# Patient Record
Sex: FEMALE | Race: WHITE | NOT HISPANIC OR LATINO | Employment: FULL TIME | URBAN - METROPOLITAN AREA
[De-identification: names, ages, dates, MRNs, and addresses within clinical notes are randomized per-mention and may not be internally consistent; named-entity substitution may affect disease eponyms.]

---

## 2017-01-03 ENCOUNTER — ALLSCRIPTS OFFICE VISIT (OUTPATIENT)
Dept: OTHER | Facility: OTHER | Age: 57
End: 2017-01-03

## 2017-01-19 ENCOUNTER — HOSPITAL ENCOUNTER (OUTPATIENT)
Dept: MRI IMAGING | Facility: HOSPITAL | Age: 57
Discharge: HOME | End: 2017-01-19
Attending: RADIOLOGY
Payer: COMMERCIAL

## 2017-01-19 DIAGNOSIS — D32.9 BENIGN NEOPLASM OF MENINGES (HCC): ICD-10-CM

## 2017-01-19 PROCEDURE — 70553 MRI BRAIN STEM W/O & W/DYE: CPT

## 2017-01-19 PROCEDURE — A9585 GADOBUTROL INJECTION: HCPCS | Performed by: RADIOLOGY

## 2017-01-19 RX ADMIN — GADOBUTROL 9 ML: 604.72 INJECTION INTRAVENOUS at 12:54

## 2017-01-25 ENCOUNTER — GENERIC CONVERSION - ENCOUNTER (OUTPATIENT)
Dept: OTHER | Facility: OTHER | Age: 57
End: 2017-01-25

## 2017-01-25 ENCOUNTER — APPOINTMENT (OUTPATIENT)
Dept: RADIATION ONCOLOGY | Facility: HOSPITAL | Age: 57
End: 2017-01-25
Attending: RADIOLOGY
Payer: COMMERCIAL

## 2017-01-25 ENCOUNTER — TRANSCRIBE ORDERS (OUTPATIENT)
Dept: ADMINISTRATIVE | Facility: HOSPITAL | Age: 57
End: 2017-01-25

## 2017-01-25 DIAGNOSIS — D32.9 BENIGN NEOPLASM OF MENINGES (HCC): Primary | ICD-10-CM

## 2017-01-25 PROCEDURE — 99214 OFFICE O/P EST MOD 30 MIN: CPT | Performed by: RADIOLOGY

## 2017-02-03 ENCOUNTER — ALLSCRIPTS OFFICE VISIT (OUTPATIENT)
Dept: OTHER | Facility: OTHER | Age: 57
End: 2017-02-03

## 2017-02-04 LAB — TSH SERPL DL<=0.05 MIU/L-ACNC: 1.89 MIU/L

## 2017-02-06 ENCOUNTER — GENERIC CONVERSION - ENCOUNTER (OUTPATIENT)
Dept: OTHER | Facility: OTHER | Age: 57
End: 2017-02-06

## 2017-03-09 ENCOUNTER — ALLSCRIPTS OFFICE VISIT (OUTPATIENT)
Dept: OTHER | Facility: OTHER | Age: 57
End: 2017-03-09

## 2017-03-09 DIAGNOSIS — Z12.31 ENCOUNTER FOR SCREENING MAMMOGRAM FOR MALIGNANT NEOPLASM OF BREAST: ICD-10-CM

## 2017-03-10 ENCOUNTER — LAB CONVERSION - ENCOUNTER (OUTPATIENT)
Dept: OTHER | Facility: OTHER | Age: 57
End: 2017-03-10

## 2017-03-10 LAB
BV/VAGINITIS PANEL DNA PROBE (HISTORICAL): NORMAL
CONTAINER TYP (HISTORICAL): NORMAL
FINAL RESOLUTION (HISTORICAL): NORMAL
QUESTION/PROBLEM (HISTORICAL): NORMAL
SPECIMEN STATUS REPORT (HISTORICAL): NORMAL

## 2017-03-16 ENCOUNTER — LAB CONVERSION - ENCOUNTER (OUTPATIENT)
Dept: OTHER | Facility: OTHER | Age: 57
End: 2017-03-16

## 2017-03-16 LAB
ADDITIONAL INFORMATION (HISTORICAL): NORMAL
ADEQUACY: (HISTORICAL): NORMAL
BV/VAGINITIS PANEL DNA PROBE (HISTORICAL): NORMAL
CONTAINER TYP (HISTORICAL): NORMAL
CYTOTECHNOLOGIST: (HISTORICAL): NORMAL
FINAL RESOLUTION (HISTORICAL): NORMAL
HPV MRNA E6/E7 (HISTORICAL): NOT DETECTED
INTERPRETATION (HISTORICAL): NORMAL
LMP (HISTORICAL): NORMAL
PREV. BX: (HISTORICAL): NORMAL
PREV. PAP (HISTORICAL): NORMAL
QUESTION/PROBLEM (HISTORICAL): NORMAL
SOURCE (HISTORICAL): NORMAL
SPECIMEN STATUS REPORT (HISTORICAL): NORMAL

## 2017-05-08 ENCOUNTER — HOSPITAL ENCOUNTER (OUTPATIENT)
Dept: MAMMOGRAPHY | Facility: MEDICAL CENTER | Age: 57
Discharge: HOME | End: 2017-05-08
Payer: COMMERCIAL

## 2017-05-08 DIAGNOSIS — Z12.31 ENCOUNTER FOR SCREENING MAMMOGRAM FOR MALIGNANT NEOPLASM OF BREAST: ICD-10-CM

## 2017-05-08 PROCEDURE — G0202 SCR MAMMO BI INCL CAD: HCPCS

## 2017-06-06 ENCOUNTER — ALLSCRIPTS OFFICE VISIT (OUTPATIENT)
Dept: OTHER | Facility: OTHER | Age: 57
End: 2017-06-06

## 2017-07-13 ENCOUNTER — HOSPITAL ENCOUNTER (OUTPATIENT)
Dept: MRI IMAGING | Facility: HOSPITAL | Age: 57
Discharge: HOME | End: 2017-07-13
Attending: RADIOLOGY
Payer: COMMERCIAL

## 2017-07-13 DIAGNOSIS — D32.9 BENIGN NEOPLASM OF MENINGES (HCC): ICD-10-CM

## 2017-07-13 PROCEDURE — 70553 MRI BRAIN STEM W/O & W/DYE: CPT

## 2017-07-13 PROCEDURE — A9585 GADOBUTROL INJECTION: HCPCS | Performed by: RADIOLOGY

## 2017-07-13 RX ADMIN — GADOBUTROL 8 ML: 604.72 INJECTION INTRAVENOUS at 09:46

## 2017-07-19 ENCOUNTER — TRANSCRIBE ORDERS (OUTPATIENT)
Dept: ADMINISTRATIVE | Facility: HOSPITAL | Age: 57
End: 2017-07-19

## 2017-07-19 ENCOUNTER — GENERIC CONVERSION - ENCOUNTER (OUTPATIENT)
Dept: OTHER | Facility: OTHER | Age: 57
End: 2017-07-19

## 2017-07-19 ENCOUNTER — APPOINTMENT (OUTPATIENT)
Dept: RADIATION ONCOLOGY | Facility: HOSPITAL | Age: 57
End: 2017-07-19
Attending: RADIOLOGY
Payer: COMMERCIAL

## 2017-07-19 DIAGNOSIS — D32.9 BENIGN NEOPLASM OF MENINGES (HCC): Primary | ICD-10-CM

## 2017-07-19 PROCEDURE — 99214 OFFICE O/P EST MOD 30 MIN: CPT | Performed by: RADIOLOGY

## 2017-07-25 DIAGNOSIS — D32.9 BENIGN NEOPLASM OF MENINGES (HCC): ICD-10-CM

## 2017-07-25 DIAGNOSIS — N83.291 OTHER OVARIAN CYST, RIGHT SIDE: ICD-10-CM

## 2017-08-13 ENCOUNTER — APPOINTMENT (EMERGENCY)
Dept: ULTRASOUND IMAGING | Facility: HOSPITAL | Age: 57
End: 2017-08-13
Payer: COMMERCIAL

## 2017-08-13 ENCOUNTER — HOSPITAL ENCOUNTER (EMERGENCY)
Facility: HOSPITAL | Age: 57
Discharge: HOME | End: 2017-08-13
Admitting: EMERGENCY MEDICINE
Payer: COMMERCIAL

## 2017-08-13 VITALS
TEMPERATURE: 98.7 F | SYSTOLIC BLOOD PRESSURE: 122 MMHG | DIASTOLIC BLOOD PRESSURE: 72 MMHG | RESPIRATION RATE: 16 BRPM | WEIGHT: 195 LBS | OXYGEN SATURATION: 98 % | HEART RATE: 67 BPM

## 2017-08-13 DIAGNOSIS — N83.202 LEFT OVARIAN CYST: Primary | ICD-10-CM

## 2017-08-13 DIAGNOSIS — R10.2 PELVIC PAIN: ICD-10-CM

## 2017-08-13 LAB
ANION GAP SERPL CALCULATED.3IONS-SCNC: 7 MMOL/L (ref 4–13)
BACTERIA UR QL AUTO: ABNORMAL /HPF
BASOPHILS # BLD AUTO: 0.02 THOUSANDS/ΜL (ref 0–0.1)
BASOPHILS NFR BLD AUTO: 0 % (ref 0–1)
BILIRUB UR QL STRIP: NEGATIVE
BUN SERPL-MCNC: 8 MG/DL (ref 5–25)
CALCIUM SERPL-MCNC: 8.9 MG/DL (ref 8.3–10.1)
CHLORIDE SERPL-SCNC: 100 MMOL/L (ref 100–108)
CLARITY UR: CLEAR
CO2 SERPL-SCNC: 30 MMOL/L (ref 21–32)
COLOR UR: YELLOW
CREAT SERPL-MCNC: 0.84 MG/DL (ref 0.6–1.3)
EOSINOPHIL # BLD AUTO: 0.12 THOUSAND/ΜL (ref 0–0.61)
EOSINOPHIL NFR BLD AUTO: 2 % (ref 0–6)
ERYTHROCYTE [DISTWIDTH] IN BLOOD BY AUTOMATED COUNT: 13.1 % (ref 11.6–15.1)
EXT BLOOD URINE: ABNORMAL
GFR SERPL CREATININE-BSD FRML MDRD: 78 ML/MIN/1.73SQ M
GLUCOSE SERPL-MCNC: 99 MG/DL (ref 65–140)
GLUCOSE UR STRIP-MCNC: NEGATIVE MG/DL
HCG UR QL: NEGATIVE
HCT VFR BLD AUTO: 33.8 % (ref 34.8–46.1)
HGB BLD-MCNC: 12.1 G/DL (ref 11.5–15.4)
HGB UR QL STRIP.AUTO: ABNORMAL
KETONES UR STRIP-MCNC: NEGATIVE MG/DL
LEUKOCYTE ESTERASE UR QL STRIP: NEGATIVE
LYMPHOCYTES # BLD AUTO: 1.01 THOUSANDS/ΜL (ref 0.6–4.47)
LYMPHOCYTES NFR BLD AUTO: 13 % (ref 14–44)
MCH RBC QN AUTO: 29.6 PG (ref 26.8–34.3)
MCHC RBC AUTO-ENTMCNC: 35.8 G/DL (ref 31.4–37.4)
MCV RBC AUTO: 83 FL (ref 82–98)
MONOCYTES # BLD AUTO: 0.89 THOUSAND/ΜL (ref 0.17–1.22)
MONOCYTES NFR BLD AUTO: 12 % (ref 4–12)
NEUTROPHILS # BLD AUTO: 5.69 THOUSANDS/ΜL (ref 1.85–7.62)
NEUTS SEG NFR BLD AUTO: 73 % (ref 43–75)
NITRITE UR QL STRIP: NEGATIVE
NON-SQ EPI CELLS URNS QL MICRO: ABNORMAL /HPF
NRBC BLD AUTO-RTO: 0 /100 WBCS
PH UR STRIP.AUTO: 6 [PH] (ref 4.5–8)
PLATELET # BLD AUTO: 176 THOUSANDS/UL (ref 149–390)
PMV BLD AUTO: 11 FL (ref 8.9–12.7)
POTASSIUM SERPL-SCNC: 3.3 MMOL/L (ref 3.5–5.3)
PROT UR STRIP-MCNC: NEGATIVE MG/DL
RBC # BLD AUTO: 4.09 MILLION/UL (ref 3.81–5.12)
RBC #/AREA URNS AUTO: ABNORMAL /HPF
SODIUM SERPL-SCNC: 137 MMOL/L (ref 136–145)
SP GR UR STRIP.AUTO: 1.01 (ref 1–1.03)
UROBILINOGEN UR QL STRIP.AUTO: 1 E.U./DL
WBC # BLD AUTO: 7.73 THOUSAND/UL (ref 4.31–10.16)
WBC #/AREA URNS AUTO: ABNORMAL /HPF

## 2017-08-13 PROCEDURE — 36415 COLL VENOUS BLD VENIPUNCTURE: CPT | Performed by: PHYSICIAN ASSISTANT

## 2017-08-13 PROCEDURE — 81025 URINE PREGNANCY TEST: CPT | Performed by: PHYSICIAN ASSISTANT

## 2017-08-13 PROCEDURE — 99284 EMERGENCY DEPT VISIT MOD MDM: CPT

## 2017-08-13 PROCEDURE — 96374 THER/PROPH/DIAG INJ IV PUSH: CPT

## 2017-08-13 PROCEDURE — 85025 COMPLETE CBC W/AUTO DIFF WBC: CPT | Performed by: PHYSICIAN ASSISTANT

## 2017-08-13 PROCEDURE — 96376 TX/PRO/DX INJ SAME DRUG ADON: CPT

## 2017-08-13 PROCEDURE — 81002 URINALYSIS NONAUTO W/O SCOPE: CPT | Performed by: PHYSICIAN ASSISTANT

## 2017-08-13 PROCEDURE — 81001 URINALYSIS AUTO W/SCOPE: CPT

## 2017-08-13 PROCEDURE — 76830 TRANSVAGINAL US NON-OB: CPT

## 2017-08-13 PROCEDURE — 76856 US EXAM PELVIC COMPLETE: CPT

## 2017-08-13 PROCEDURE — 80048 BASIC METABOLIC PNL TOTAL CA: CPT | Performed by: PHYSICIAN ASSISTANT

## 2017-08-13 RX ORDER — HYDROCHLOROTHIAZIDE 50 MG/1
50 TABLET ORAL DAILY
COMMUNITY
End: 2019-02-06 | Stop reason: ALTCHOICE

## 2017-08-13 RX ORDER — LEVOTHYROXINE SODIUM 0.1 MG/1
100 TABLET ORAL DAILY
COMMUNITY
End: 2018-08-01 | Stop reason: ALTCHOICE

## 2017-08-13 RX ORDER — ASPIRIN 325 MG
325 TABLET ORAL DAILY
COMMUNITY
End: 2018-08-01 | Stop reason: ALTCHOICE

## 2017-08-13 RX ORDER — KETOROLAC TROMETHAMINE 30 MG/ML
15 INJECTION, SOLUTION INTRAMUSCULAR; INTRAVENOUS ONCE
Status: COMPLETED | OUTPATIENT
Start: 2017-08-13 | End: 2017-08-13

## 2017-08-13 RX ORDER — NAPROXEN 500 MG/1
500 TABLET ORAL 2 TIMES DAILY WITH MEALS
Qty: 30 TABLET | Refills: 0 | Status: SHIPPED | OUTPATIENT
Start: 2017-08-13 | End: 2018-08-01 | Stop reason: ALTCHOICE

## 2017-08-13 RX ADMIN — KETOROLAC TROMETHAMINE 15 MG: 30 INJECTION, SOLUTION INTRAMUSCULAR at 10:23

## 2017-08-13 RX ADMIN — KETOROLAC TROMETHAMINE 15 MG: 30 INJECTION, SOLUTION INTRAMUSCULAR at 14:37

## 2017-08-16 ENCOUNTER — ALLSCRIPTS OFFICE VISIT (OUTPATIENT)
Dept: OTHER | Facility: OTHER | Age: 57
End: 2017-08-16

## 2017-08-16 PROCEDURE — 88305 TISSUE EXAM BY PATHOLOGIST: CPT | Performed by: NURSE PRACTITIONER

## 2017-08-17 ENCOUNTER — LAB REQUISITION (OUTPATIENT)
Dept: LAB | Facility: HOSPITAL | Age: 57
End: 2017-08-17
Payer: COMMERCIAL

## 2017-08-17 DIAGNOSIS — R93.89 ABNORMAL FINDINGS ON DIAGNOSTIC IMAGING OF OTHER SPECIFIED BODY STRUCTURES: ICD-10-CM

## 2017-09-29 ENCOUNTER — HOSPITAL ENCOUNTER (OUTPATIENT)
Dept: ULTRASOUND IMAGING | Facility: MEDICAL CENTER | Age: 57
Discharge: HOME | End: 2017-09-29
Payer: COMMERCIAL

## 2017-09-29 DIAGNOSIS — N83.291 OTHER OVARIAN CYST, RIGHT SIDE: ICD-10-CM

## 2017-09-29 PROCEDURE — 76830 TRANSVAGINAL US NON-OB: CPT

## 2017-09-29 PROCEDURE — 76856 US EXAM PELVIC COMPLETE: CPT

## 2017-10-10 DIAGNOSIS — N83.209 CYST OF OVARY: ICD-10-CM

## 2017-10-19 ENCOUNTER — ALLSCRIPTS OFFICE VISIT (OUTPATIENT)
Dept: OTHER | Facility: OTHER | Age: 57
End: 2017-10-19

## 2017-10-20 NOTE — PROGRESS NOTES
Assessment  1  Acute bronchitis (466 0) (J20 9)  2  Pain in both hands (729 5) (M79 641,M79 642)1      1 Amended By: Mariana Bowen; Oct 19 2017 10:28 AM EST    Plan  Acute bronchitis    · Start: Azithromycin 250 MG Oral Tablet; TAKE 2 TABLETS ON DAY 1 THEN TAKE 1  TABLET A DAY FOR 4 DAYS   · Call [phone] if: The cough is not gone in 10 days ; Status:Complete;   Done:  31MNB2285   · Call [phone] if: You have pain in the chest that gets worse with deep breathing or  coughing ; Status:Complete;   Done: 79ZLS7490   · Seek Immediate Medical Attention if: ; Status:Complete;   Done: 86OUO3400   · Seek Immediate Medical Attention if: You are feeling short of breath ; Status:Complete;    Done: 91XFY4115   · Use a cool mist humidifier in the room ; Status:Complete;   Done: 14UTZ7833    Discussion/Summary    Recommend evaluation by orthopedic hand specialist  Card [facility]  Also, she will call if no improvement in symptoms  Sample of Dulera inhaler given to patient to be used 2 puffs twice a day  If no improvement consider chest x-ray  Recommended naproxen twice daily for hand pain as needed  Chief Complaint  head cold      History of Present Illness  HPI: Patient is here today with cough for one and a half weeks  Cough is occasionally productive  No GI complaints  She admits to occasional sinus congestion  No rashes  She notes occasional pain to the bilateral hands as well  Onset 612 months ago  Pain is worse at the end of a long workday  She notes pain symmetrically and pain is worse to the bases of the fingers  Denies any arm numbness or upper arm or shoulder pain  Review of Systems    Constitutional: No fever, no chills, feels well, no tiredness, no recent weight gain or loss  ENT: as noted in HPI  Cardiovascular: no complaints of slow or fast heart rate, no chest pain, no palpitations, no leg claudication or lower extremity edema     Respiratory: as noted in HPI  Breasts: no complaints of breast pain, breast lump or nipple discharge  Gastrointestinal: no complaints of abdominal pain, no constipation, no nausea or diarrhea, no vomiting, no bloody stools  Genitourinary: no complaints of dysuria, no incontinence, no pelvic pain, no dysmenorrhea, no vaginal discharge or abnormal vaginal bleeding  Musculoskeletal: as noted in HPI  Integumentary: no complaints of skin rash or lesion, no itching or dry skin, no skin wounds  Neurological: no complaints of headache, no confusion, no numbness or tingling, no dizziness or fainting  Active Problems  1  Acute frontal sinusitis (461 1) (J01 10)  2  Aftercare following surgery for neoplasm (V58 42) (Z48 3)  3  Allergic rhinitis (477 9) (J30 9)  4  Difficulty balancing (781 99) (R29 818)  5  Encounter for pre-employment examination (V70 5) (Z02 1)  6  Encounter for routine gynecological examination with Papanicolaou smear of cervix   (V72 31,V76 2) (Z01 419)  7  Encounter for screening colonoscopy (V76 51) (Z12 11)  8  Encounter for screening mammogram for malignant neoplasm of breast (V76 12)   (Z12 31)  9  Esophageal reflux (530 81) (K21 9)  10  Fatigue (780 79) (R53 83)  11  Foot Pain (Soft Tissue) (729 5)  12  Glossitis (529 0) (K14 0)  13  Hemorrhagic cyst of ovary (620 2) (N83 209)  14  Hypertension (401 9) (I10)  15  Hypothyroidism (244 9) (E03 9)  16  Lateral epicondylitis (726 32) (M77 10)  17  Meningioma (225 2) (D32 9)  18  Migraine headache (346 90) (G43 909)  19  Other ovarian cyst, right side (620 2) (N83 291)  20  Rib pain (786 50) (R07 81)  21  Screening for colon cancer (V76 51) (Z12 11)  22  Slurred speech (784 59) (R47 81)  23  Strain of right shoulder (840 9) (S46 911A)  24  Tendonitis (726 90) (M77 9)  25  Thickened endometrium (793 5) (R93 8)  26  Urinary tract infection (599 0) (N39 0)  27  Vaginal discharge (623 5) (N89 8)  28   Visit for pre-operative examination (V72 84) (I78 039)    Past Medical History  1  Acute bronchitis (466 0) (J20 9)  2  Acute sinusitis (461 9) (J01 90)  3  Acute sinusitis (461 9) (J01 90)  4  History of Aftercare following surgery for neoplasm (V58 42) (Z48 3)  5  History of acute sinusitis (V12 69) (Z87 09)  6  History of acute sinusitis (V12 69) (Z87 09)  7  History of Postoperative visit (V58 49) (Z48 89)  Active Problems And Past Medical History Reviewed: The active problems and past medical history were reviewed and updated today  Family History  Father   1  Family history of   Maternal Grandmother   2  Family history of   Paternal Grandmother   3  Family history of   Maternal Grandfather   4  Family history of   Paternal Grandfather   5  Family history of   Family History   6  Family history of diabetes mellitus (V18 0) (Z83 3)  7  Family history of myocardial infarction (V17 3) (Z82 49)  8  Family history of Multiple Sclerosis    Social History   · Currently sexually active   · Full-time employment   · Lives with spouse   ·    · Never A Smoker   · No alcohol use   · No drug use   · Occupation   ·    · Stopped Drinking Alcohol    Surgical History  1  History of Craniotomy (Diagnostic)  2  History of Gallbladder Surgery  3  History of Intracranial Stereotactic Radiosurgery Complex Lesion  4  History of Tubal Ligation  Surgical History Reviewed: The surgical history was reviewed and updated today  Current Meds  1  Levothyroxine Sodium 50 MCG Oral Tablet; TAKE 1 TABLET DAILY; Therapy: 98OQW3841 to (Uriel Diallo)  Requested for: 57Ygy8558; Last   Rx:34Urh7653 Ordered  2  Losartan Potassium-HCTZ 50-12 5 MG Oral Tablet; Take 1 tablet once daily; Therapy: 56QEY2427 to (Last Rx:21Ear3137)  Requested for: 94Edr7923 Ordered  3  Rizatriptan Benzoate 10 MG Oral Tablet Disintegrating; TAKE 1 TABLET Daily PRN   headache;    Therapy: 49HKV4696 to (Evaluate:47Vvr9432)  Requested for: 52Tob7445; Last Rx:94Nzw9979 Ordered    The medication list was reviewed and updated today  Allergies  1  Augmentin XR TB12  2  Augmentin SUSR    Vitals   Recorded: 10GLI0846 09:19AM   Temperature 32 2 F   Systolic 233   Diastolic 80   Height 5 ft 3 in   Weight 196 lb    BMI Calculated 34 72   BSA Calculated 1 92     Physical Exam    Constitutional   General appearance: No acute distress, well appearing and well nourished  Eyes   Conjunctiva and lids: No swelling, erythema or discharge  Pupils and irises: Equal, round and reactive to light  Ears, Nose, Mouth, and Throat   External inspection of ears and nose: Normal     Otoscopic examination: Tympanic membranes translucent with normal light reflex  Canals patent without erythema  Nasal mucosa, septum, and turbinates: Normal without edema or erythema  Oropharynx: Normal with no erythema, edema, exudate or lesions  Pulmonary   Respiratory effort: No increased work of breathing or signs of respiratory distress  Auscultation of lungs: Clear to auscultation  Cardiovascular   Palpation of heart: Normal PMI, no thrills  Auscultation of heart: Normal rate and rhythm, normal S1 and S2, without murmurs  Examination of extremities for edema and/or varicosities: Normal     Carotid pulses: Normal     Abdomen   Abdomen: Non-tender, no masses  Liver and spleen: No hepatomegaly or splenomegaly  Lymphatic   Palpation of lymph nodes in neck: No lymphadenopathy  Musculoskeletal   Gait and station: Normal     Digits and nails: Normal without clubbing or cyanosis  Inspection/palpation of joints, bones, and muscles: Normal     Skin   Skin and subcutaneous tissue: Normal without rashes or lesions  Neurologic   Cranial nerves: Cranial nerves 2-12 intact  Reflexes: 2+ and symmetric  Sensation: No sensory loss      Psychiatric   Orientation to person, place, and time: Normal     Mood and affect: Normal          Signatures   Electronically signed by : Zahira Villa DO; Oct 19 2017 10:28AM EST                       (Author)

## 2018-01-11 NOTE — PROCEDURES
Procedures by Sherrie Garcia MD at 2016  12:44 PM      Author:  Sherrie Garcia MD Service:  (none) Author Type:  Physician     Filed:  2016  4:07 PM Date of Service:  2016 12:44 PM Status:  Signed     :  Sherrie Garcia MD (Physician)         Pre-procedure Diagnoses:       1  Meningioma [D32 9]                Post-procedure Diagnoses:       1  Meningioma [D32 9]                Procedures:       1  MS STEREOTACTIC RADIOSURGERY, Karthikeyan Waldron [46619 (CPT®)]                   PATIENT NAME: Mery Mcbride  : 1960  MRN: [id number]  PROCEDURE DATE: 16    Stereotactic Radiotherapy (SRT) Operative Note    Preop Diagnosis: recurrent right skull base meningioma    Postop Diagnosis: same    Procedure Details: Frameless Stereotactic Radiotherapy for recurrent meningioma    Surgeon: Sherrie Garcia MD, PhD     Assistants: none    No qualified resident was available to assist with this case  Radiation Oncologist(s): Estefania John    Medical Physicist(s): Eli Greene and Yessi Stoner    Estimated Blood Loss:  None           Specimens: None    Drains: None           Total IV Fluids: None              Findings: As above  Complications:  None    Anesthesia: None      INDICATIONS    50 y o  female who is now 2 years s/p a subtotal resection of a WHO I meningioma from the right CP angle (more at skull base, near hypoglossal canal, involving lateral dura on right) by Dr Shanae Hubbard  Surveillance imaging since that  time shows some growth/recurrence  Dr Shanae Hubbard referred the patient to the [facility]/Dr Radha Bianchi to discuss RT options, where the patient was seen by Dr Radha Bianchi, as well as myself  SRT and IMRT were felt to be RT options  Risks such as radiation necrosis were discussed/reviewed  Planning was initiated, and SRT was felt to be feasible and safe  During the patient's office encounter, a discussion of risks, benefits, and alternatives of various treatment options were reviewed with the patient  Options discussed included but were not limited to surgery, radiation therapy including variations thereof,  such as a whole brain radiation therapy, SRT, etc , chemotherapy alone, and combinations of the above  Risks specific to SRT were reviewed, including but not limited to radiation necrosis  The patient wished to proceed with SRT, and consent was obtained  DETAILS OF PROCEDURE    The patient presented to the outpatient area of the Department of Radiation Oncology where an open faced immobilization mask was created  The patient then underwent  a stereotactic head CT while immobilized in the mask  The patient was then released from the Department  The patients stereotactic CT and previous stereotactic MRI scans were fused in the [facility], which was used to develop the SRT plan  The SRT prescription called for a dose of 25 Gray to be delivered to the PTV in 5 fractions  The PTV was created by expanding the GTV, as contoured  by myself and the Radiation Oncologist, by 2 mm  Therefore, the entire GTV volume received >25 Rawleigh Butt  The GTV measured  4 6 cc, and the PTV measured 11 2 cc  The SRT plan utilized 2 rapid arcs, with the mini-multileaf columnator on the TrueBeam machine  at the [employer]  Organs at risk included the brain stem and spinal cord  When the final treatment plan had been developed and approved by myself, the radiation oncologist and physicist, the patient returned to the Department for their first frameless SRT treatment  The patient was positioned on the treatment couch  The Optic Surface Monitoring System (OSMS) was used initially to align the patient  The patient was then immobilized in their open faced mask  kV and then cone beam CT imaging was used to further align  the patient  Once the radiation oncologist, physicist and I agreed the patient was in correct position, the fields were treated sequentially without complications   The OSMS was used during the treatment to assure continued correct positioning of the patient,  and if it detected patient motion, interrupted the treatment beam until the patient was back in alignment  When the first SRT treatment had been delivered, the patient was recovered from the treatment room, scheduled for their remaining SRT treatments, and was discharged  from the department  There was no blood loss and no specimen       par  SIGNATURE: Sergio Mahoney MD, PhD  DATE: 7/6/16   TIME: 16:07                                  Received for:Salvador Martinez MD PhD  Jul 6 2016  4:06PM Eastern Standard Time

## 2018-01-12 VITALS
SYSTOLIC BLOOD PRESSURE: 120 MMHG | BODY MASS INDEX: 34.73 KG/M2 | DIASTOLIC BLOOD PRESSURE: 80 MMHG | TEMPERATURE: 99.2 F | WEIGHT: 196 LBS | HEIGHT: 63 IN

## 2018-01-12 VITALS
TEMPERATURE: 97.7 F | HEART RATE: 66 BPM | WEIGHT: 193 LBS | RESPIRATION RATE: 16 BRPM | HEIGHT: 63 IN | DIASTOLIC BLOOD PRESSURE: 90 MMHG | SYSTOLIC BLOOD PRESSURE: 130 MMHG | OXYGEN SATURATION: 98 % | BODY MASS INDEX: 34.2 KG/M2

## 2018-01-13 VITALS
TEMPERATURE: 97.9 F | DIASTOLIC BLOOD PRESSURE: 80 MMHG | BODY MASS INDEX: 35.08 KG/M2 | WEIGHT: 198 LBS | HEIGHT: 63 IN | SYSTOLIC BLOOD PRESSURE: 120 MMHG

## 2018-01-13 VITALS
WEIGHT: 193 LBS | DIASTOLIC BLOOD PRESSURE: 82 MMHG | TEMPERATURE: 97.9 F | BODY MASS INDEX: 34.2 KG/M2 | SYSTOLIC BLOOD PRESSURE: 136 MMHG | HEIGHT: 63 IN

## 2018-01-13 VITALS
BODY MASS INDEX: 34.96 KG/M2 | DIASTOLIC BLOOD PRESSURE: 80 MMHG | SYSTOLIC BLOOD PRESSURE: 138 MMHG | WEIGHT: 197.38 LBS

## 2018-01-13 VITALS
DIASTOLIC BLOOD PRESSURE: 84 MMHG | SYSTOLIC BLOOD PRESSURE: 140 MMHG | WEIGHT: 200 LBS | TEMPERATURE: 98.1 F | HEIGHT: 63 IN | BODY MASS INDEX: 35.44 KG/M2

## 2018-01-13 NOTE — RESULT NOTES
Message   Thyroid levels in normal range  Verified Results  (Q) TSH, 3RD GENERATION [id number] 07:00AM Mandy Pillion     Test Name Result Flag Reference   TSH 1 89 mIU/L     Reference Range                         > or = 20 Years  0 40-4 50                              Pregnancy Ranges            First trimester    0 26-2 66            Second trimester   0 55-2 73            Third trimester    0 43-2 91  NO COLLECTION DATE RECEIVED  WE HAVE USED  THE DATE THE SPECIMEN WAS RECEIVED BY THIS  LABORATORY AS THE COLLECTION DATE  IF THIS  IS INCORRECT, PLEASE CONTACT CLIENT SERVICES    PHONE NUMBER: [phone]

## 2018-01-14 VITALS — SYSTOLIC BLOOD PRESSURE: 128 MMHG | WEIGHT: 195.31 LBS | BODY MASS INDEX: 34.6 KG/M2 | DIASTOLIC BLOOD PRESSURE: 60 MMHG

## 2018-01-14 VITALS
BODY MASS INDEX: 34.44 KG/M2 | WEIGHT: 194.38 LBS | SYSTOLIC BLOOD PRESSURE: 124 MMHG | HEART RATE: 66 BPM | TEMPERATURE: 97.4 F | HEIGHT: 63 IN | OXYGEN SATURATION: 98 % | DIASTOLIC BLOOD PRESSURE: 74 MMHG

## 2018-01-15 NOTE — PROCEDURES
Procedures by Milind Kohli MD at 2016   1:14 PM      Author:  Milind Kohli MD Service:  (none) Author Type:  Physician     Filed:  2016  1:33 PM Date of Service:  2016  1:14 PM Status:  Signed     :  Milind Kohli MD (Physician)         Pre-procedure Diagnoses:       1  Meningioma [D32 9]                Post-procedure Diagnoses:       1  Meningioma [D32 9]                Procedures:       1  AL STEREOTACTIC RADIOSURGERY, Davidjosette Acharya [31130 (CPT®)]                   PATIENT NAME: Юлия Borjas  : 1960  MRN: [id number]  PROCEDURE DATE: 16      Stereotactic Radiotherapy (SRT) Operative Note    Preop Diagnosis: recurrent right skull base meningioma    Postop Diagnosis: same    Procedure Details: Frameless Stereotactic Radiotherapy for recurrent meningioma    Surgeon: Milind Kohli MD, PhD     Assistants: none    No qualified resident was available to assist with this case  Radiation Oncologist(s): Estefania John    Medical Physicist(s): Krystle Jimenez and Bing Lucas    Estimated Blood Loss:  None           Specimens: None    Drains: None           Total IV Fluids: None              Findings: As above  Complications:  None    Anesthesia: None      INDICATION    50 y o  female who is now 2 years s/p a subtotal resection of a WHO I meningioma from the right CP angle (more at skull base, near hypoglossal canal, involving lateral dura on right) by Dr Gisselle Butterfield  Surveillance imaging since that time shows some growth/recurrence  Dr Gisselle Butterfield referred the patient to the [facility]/Dr Rhona Sandhu to discuss RT options, where the patient was seen by Dr Rhona Sandhu, as well as myself  SRT and IMRT were felt to be RT options  Risks such as radiation necrosis were discussed/reviewed  Planning was initiated, and SRT was felt to be feasible and safe       During the patient's office encounter, a discussion of risks, benefits, and alternatives of various treatment options were reviewed with the patient  Options discussed included but were not  limited to surgery, radiation therapy including variations thereof, such as a whole brain radiation therapy, SRT, etc , chemotherapy alone, and combinations of the above  Risks specific to SRT were reviewed, including but not limited to radiation necrosis  The patient wished to proceed with SRT, and consent was obtained  The patient was simulated, planned, and even underwent her first treatment session of SRT, for which I was present, on 7/6/16  However, there were considerable difficulties on subsequent  visits (her second session, etc ), in particular in matching her initial positioning & set up, such that accuracy and precision with her initial mobilization and plan could not be confirmed  As a result, in discussions with Dr Rhona Sandhu, we elected to not  utilize the rest of her initial SRT plan, and the patient underwent another simulation session  From that, A new facemask/molding, head mold, also including a bite block, were fashioned, and a new SRT plan, as below, was created, building off the already  given dosing, but with different dose per fraction, etc  She returned to begin this new SRT plan on 7/20/16, for which I was present  DETAILS OF PROCEDURE    The patient presented to the outpatient area of the Department of Radiation Oncology where an open faced immobilization mask was created , which in this case included a bite block  The patient then underwent a stereotactic head CT while immobilized in the mask  The patient was then released from the Department  The patients stereotactic CT and previous stereotactic MRI scans were fused in the [facility], which was used to develop the new SRT plan  The SRT prescription called for a dose of 13 5 Gray to be delivered to the PTV in 3 fractions  The PTV was created by expanding the GTV, as  contoured by myself and the Radiation Oncologist, by 2 mm   Therefore, the entire GTV volume received >12 [place] , but also in summation with her previously 2 fractions at 5 Gy per fraction  The GTV measured 4 6 cc, and the PTV measured 10 7  cc  The SRT plan utilized 2 Rapid arcs, with the mini-multileaf columnator on the TrueBeam machine at the [employer]  When the new treatment plan had been developed and approved by myself, the radiation oncologist and physicist, the patient returned to the Department for their first frameless SRT treatment  using this new plan  The patient was positioned on the treatment couch  The Optic Surface Monitoring System (OSMS) was used initially to align the patient  The patient was then immobilized in their open faced mask  kV and then cone beam CT imaging was used to further align  the patient  Once the radiation oncologist, physicist and I agreed the patient was in correct position, the fields were treated sequentially without complications  The OSMS was used during the treatment to assure continued correct positioning of the patient,  and if it detected patient motion, interrupted the treatment beam until the patient was back in alignment  When the first SRT treatment had been delivered, the patient was recovered from the treatment room, scheduled for their remaining SRT treatments, and was discharged  from the department  There was no blood loss and no specimen       par  SIGNATURE: Rachel Wilson MD, PhD  DATE: 7/20/2016   TIME: 1:15 PM                                  Received for:Salvador Martinez MD PhD  Jul 20 2016  1:32PM Eastern Standard Time

## 2018-01-17 NOTE — PROGRESS NOTES
Assessment    1  Encounter for routine gynecological examination with Papanicolaou smear of cervix   (V72 31,V76 2) (Z01 419)    Plan  Encounter for routine gynecological examination with Papanicolaou smear of cervix    · (Q) THINPREP PAP AND HPV mRNA E6/E7; Status:Active - Retrospective By Protocol  Authorization; Requested PRY:[id number];    Perform:[facility]; JRR:[id number]; Last Updated By:Herbert Wyman; 3/9/2017 11:49:19 AM;Ordered;  For:Encounter for routine gynecological examination with Papanicolaou smear of cervix; Ordered By:Aldair River;  Encounter for screening colonoscopy    · *1 - 20 [facility] Physician Referral   Consult Only: the expectation is that the referring provider will communicate  back to the patient on treatment options  Evaluation and Treatment: the expectation  is that the referred to provider will communicate back to the patient on  treatment options  Status: Active - Retrospective By Protocol Authorization  Requested  for: [id number]   Ordered; For: Encounter for screening colonoscopy; Ordered By: Matthew Tapia Performed:  Due: 74CPF7259; Last Updated By: Matthieu Underwood; 3/9/2017 11:43:36 AM  Care Summary provided  : Yes  Encounter for screening mammogram for malignant neoplasm of breast    · * MAMMO SCREENING BILATERAL W CAD; Status:Hold For - Scheduling,Retrospective  By Protocol Authorization; Requested OFU:[id number];    Perform:[facility]; ZKS:[id number]; Last Updated By:Herbert Wyman; 3/9/2017 11:20:06 AM;Ordered;  For:Encounter for screening mammogram for malignant neoplasm of breast; Ordered By:Aldair River;  Vaginal discharge    · (1) VAGINITIS/ VAGINOSIS, DNA ( AFFIRM); Status:Active - Retrospective By Protocol  Authorization; Requested WJT:[id number];    Perform:[facility]; AAC:[id number]; Last Updated By:Herbert Wyman; 3/9/2017 11:49:19 AM;Ordered;   For:Vaginal discharge; Ordered By:Aldair River;    Discussion/Summary  health maintenance visit the risks and benefits of cervical cancer screening were discussed HPV and Pap Co-testing Done Today Breast cancer screening: the risks and benefits of breast cancer screening were discussed, self breast exam technique was taught, monthly self breast exam was advised, mammogram has been ordered and mammogram is needed every year  Colorectal cancer screening: the risks and benefits of colorectal cancer screening were discussed and colonoscopy has been ordered  Advice and education were given regarding weight bearing exercise, calcium supplements and vitamin D supplements  Patient discussion: discussed with the patient  Normal gyn exam  Pap w cotesting sent  Pt given order to schedule mammogram    Pt given order for colonoscopy at her request; will call pcp to check if needs authorization  affirm culture sent  She will be called with results  And at that time I will also send rx for erythemic area on buttocks  rto one year, call sooner w concerns  Chief Complaint  Yearly      History of Present Illness  HPI: 48 yo  new pt here for annual exam    Last seen here in  and had normal pap  sexually active infreq, but has dryness with coitus  Last mammo a few years ago  no breast, pelvic or g/u compaints  occ  irritation outside vagina  SH: dx'd with brain tumor in , has had surgery and radiation  Gets freq  MRIs       GYN , Adult Female [facility]: The patient is being seen for a gynecology evaluation  General Health:   Lifestyle:  She does not use tobacco  She denies alcohol use  She denies drug use  Reproductive health: the patient is postmenopausal   she is sexually active  pregnancy history: G 3P 3  Screening: Cervical cancer screening includes uncertain timing of her last pap smear and uncertain timing of her last human papilloma virus screening   Breast cancer screening includes uncertain timing of her last mammogram       Review of Systems  no pelvic pain, no vaginal pain, no vaginal discharge, no vaginal itching, no nonmenstrual bleeding, no vulvar itching, no vulvar lump or mass and no postmenopausal bleeding  Constitutional: No fever, no chills, feels well, no tiredness, no recent weight gain or loss  ENT: no ear ache, no loss of hearing, no nosebleeds or nasal discharge, no sore throat or hoarseness  Cardiovascular: no complaints of slow or fast heart rate, no chest pain, no palpitations, no leg claudication or lower extremity edema  Respiratory: no complaints of shortness of breath, no wheezing, no dyspnea on exertion, no orthopnea or PND  Breasts: no complaints of breast pain, breast lump or nipple discharge  Gastrointestinal: no complaints of abdominal pain, no constipation, no nausea or diarrhea, no vomiting, no bloody stools  Genitourinary: no complaints of dysuria, no incontinence, no pelvic pain, no dysmenorrhea, no vaginal discharge or abnormal vaginal bleeding  Musculoskeletal: no complaints of arthralgia, no myalgia, no joint swelling or stiffness, no limb pain or swelling  Integumentary: no complaints of skin rash or lesion, no itching or dry skin, no skin wounds  Neurological: brain tumor, but no complaints of headache, no confusion, no numbness or tingling, no dizziness or fainting and as noted in HPI  ROS reviewed  OB History  Pregnancy History (Brief):   Prior pregnancies: : 3  Para: 3 (living)       Active Problems    1  Acute frontal sinusitis (461 1) (J01 10)   2  Aftercare following surgery for neoplasm (V58 42) (Z48 3)   3  Allergic rhinitis (477 9) (J30 9)   4  Difficulty balancing (781 99) (R29 818)   5  Encounter for pre-employment examination (V70 5) (Z02 1)   6  Encounter for screening mammogram for malignant neoplasm of breast (V76 12)   (Z12 31)   7  Esophageal reflux (530 81) (K21 9)   8  Fatigue (780 79) (R53 83)   9  Foot Pain (Soft Tissue) (729 5)   10  Glossitis (529 0) (K14 0)   11   Hypertension (401 9) (I10)   12  Hypothyroidism (244 9) (E03 9)   13  Lateral epicondylitis (726 32) (M77 10)   14  Meningioma (225 2) (D32 9)   15  Migraine headache (346 90) (G43 909)   16  Rib pain (786 50) (R07 81)   17  Screening for colon cancer (V76 51) (Z12 11)   18  Slurred speech (784 59) (R47 81)   19  Strain of right shoulder (840 9) (S46 911A)   20  Tendonitis (726 90) (M77 9)   21  Urinary tract infection (599 0) (N39 0)   22  Visit for pre-operative examination (V72 84) (X21 357)    Past Medical History    · Acute sinusitis (461 9) (J01 90)   · Acute sinusitis (461 9) (J01 90)   · History of Aftercare following surgery for neoplasm (V58 42) (Z48 3)   · History of acute sinusitis (V12 69) (Z87 09)   · History of acute sinusitis (V12 69) (Z87 09)   · History of Postoperative visit (V58 49) (Z48 89)    The active problems and past medical history were reviewed and updated today  Surgical History    · History of Craniotomy (Diagnostic)   · History of Gallbladder Surgery   · History of Intracranial Stereotactic Radiosurgery Complex Lesion   · History of Tubal Ligation    The surgical history was reviewed and updated today  Family History  Father    · Family history of   Maternal Grandmother    · Family history of   Paternal Grandmother    · Family history of   Maternal Grandfather    · Family history of   Paternal Grandfather    · Family history of   Family History    · Family history of diabetes mellitus (V18 0) (Z83 3)   · Family history of myocardial infarction (V17 3) (Z82 49)   · Family history of Multiple Sclerosis    The family history was reviewed and updated today  Social History    · Currently sexually active   · Full-time employment   · Lives with spouse   ·    · Never A Smoker   · No alcohol use   · No drug use   · Occupation   ·    · Stopped Drinking Alcohol  The social history was reviewed and updated today   The social history was reviewed and is unchanged  Current Meds   1  Aleve 220 MG Oral Capsule; TAKE 1 CAPSULE Twice daily PRN headaches / migraines; Therapy: (Recorded:2016) to Recorded   2  Fluticasone Propionate 50 MCG/ACT Nasal Suspension; USE 2 SPRAYS IN EACH   NOSTRIL ONCE DAILY; Therapy: 96CDD6387 to (Last Rx:2016)  Requested for: 97DPR1075 Ordered   3  Levothyroxine Sodium 50 MCG Oral Tablet; TAKE 1 TABLET DAILY; Therapy: 39TFZ4106 to ([facility] [place] [facility])  Requested for: 77Lry4823; Last   Rx:80Rmf0266 Ordered   4  Losartan Potassium-HCTZ 50-12 5 MG Oral Tablet; Take 1 tablet once daily; Therapy: 14KDV8563 to (Last Rx:09Xby9961)  Requested for: 25Qbb1279 Ordered   5  Meloxicam 15 MG Oral Tablet; TAKE 1 TABLET Daily PRN joint pain; Therapy: 21EPY4101 to ([facility])  Requested for: 77VEW1366; Last   X62YUN7006 Ordered   6  Rizatriptan Benzoate 10 MG Oral Tablet Dispersible; TAKE 1 TABLET Daily PRN   headache; Therapy: 97MTK9672 to (Last Rx:2016)  Requested for: 18TIS9539 Ordered    Allergies    1  Augmentin XR TB12   2  Augmentin SUSR    Vitals   Recorded: 61FQF3892 30:30NY   Systolic 543   Diastolic 60   Weight 296 lb 4 96 oz     Physical Exam    Constitutional   General appearance: No acute distress, well appearing and well nourished  Neck   Neck: Normal, supple, trachea midline, no masses  Thyroid: Normal, no thyromegaly  Pulmonary   Respiratory effort: No increased work of breathing or signs of respiratory distress  Auscultation of lungs: Clear to auscultation  Cardiovascular   Auscultation of heart: Normal rate and rhythm, normal S1 and S2, no murmurs  Genitourinary   External genitalia: Normal and no lesions appreciated  areas of erythema, well demarcated c/w fungus on buttocks  Vagina: Normal, no lesions or dryness appreciated  Vagina: moderate, white and thick vaginal discharge     Urethra: Normal     Urethral meatus: Normal     Bladder: Normal, soft, non-tender and no prolapse or masses appreciated  Cervix: Normal, no palpable masses  Uterus: Normal, non-tender, not enlarged, and no palpable masses  Adnexa/parametria: Normal, non-tender and no fullness or masses appreciated  Chest   Breasts: Normal and no dimpling or skin changes noted  Abdomen   Abdomen: Normal, non-tender, and no organomegaly noted  Liver and spleen: No hepatomegaly or splenomegaly  Examination for hernias: No hernias appreciated  Lymphatic   Palpation of lymph nodes in neck, axillae, groin and/or other locations: No lymphadenopathy or masses noted  Skin   Skin and subcutaneous tissue: Normal skin turgor and no rashes  Palpation of skin and subcutaneous tissue: Normal     Psychiatric   Orientation to person, place, and time: Normal     Mood and affect: Normal        Signatures   Electronically signed by :  Pratibha Chaney; Mar  9 2017  1:49PM EST                       (Author)    Electronically signed by : BREE Crow ; Mar 10 2017  9:33AM EST

## 2018-01-19 DIAGNOSIS — D32.9 BENIGN NEOPLASM OF MENINGES (HCC): ICD-10-CM

## 2018-01-26 ENCOUNTER — HOSPITAL ENCOUNTER (OUTPATIENT)
Dept: MRI IMAGING | Facility: HOSPITAL | Age: 58
Discharge: HOME | End: 2018-01-26
Attending: RADIOLOGY
Payer: COMMERCIAL

## 2018-01-26 DIAGNOSIS — D32.9 BENIGN NEOPLASM OF MENINGES (HCC): ICD-10-CM

## 2018-01-26 PROCEDURE — 70553 MRI BRAIN STEM W/O & W/DYE: CPT

## 2018-01-26 PROCEDURE — A9585 GADOBUTROL INJECTION: HCPCS | Performed by: RADIOLOGY

## 2018-01-26 RX ADMIN — GADOBUTROL 9 ML: 604.72 INJECTION INTRAVENOUS at 08:37

## 2018-01-29 DIAGNOSIS — D32.9 BENIGN NEOPLASM OF MENINGES (HCC): Primary | ICD-10-CM

## 2018-01-31 ENCOUNTER — APPOINTMENT (OUTPATIENT)
Dept: RADIATION ONCOLOGY | Facility: HOSPITAL | Age: 58
End: 2018-01-31
Attending: RADIOLOGY
Payer: COMMERCIAL

## 2018-01-31 ENCOUNTER — RADIATION ONCOLOGY FOLLOW-UP (OUTPATIENT)
Dept: RADIATION ONCOLOGY | Facility: HOSPITAL | Age: 58
End: 2018-01-31

## 2018-01-31 VITALS
BODY MASS INDEX: 36.99 KG/M2 | HEART RATE: 66 BPM | OXYGEN SATURATION: 96 % | HEIGHT: 62 IN | RESPIRATION RATE: 16 BRPM | DIASTOLIC BLOOD PRESSURE: 95 MMHG | SYSTOLIC BLOOD PRESSURE: 138 MMHG | WEIGHT: 201 LBS | TEMPERATURE: 98.6 F

## 2018-01-31 DIAGNOSIS — D32.9 BENIGN MENINGIOMA (HCC): Primary | ICD-10-CM

## 2018-01-31 PROCEDURE — 99214 OFFICE O/P EST MOD 30 MIN: CPT | Performed by: RADIOLOGY

## 2018-01-31 RX ORDER — RIZATRIPTAN BENZOATE 10 MG/1
1 TABLET, ORALLY DISINTEGRATING ORAL DAILY PRN
COMMUNITY
Start: 2014-01-16 | End: 2018-02-02 | Stop reason: SDUPTHER

## 2018-01-31 NOTE — PROGRESS NOTES
Katie Asencio  1960  [id number]    Interval History:  Patient present today for SRT follow up last seen 7/19/17  Last treatment completed on 7/25/16 1/26/18: MRI  Impression: Stable right lateral foramen magnum enhancing mass consistent with meningioma, essentially unchanged  Mild chronic microangiopathic ischemic changes involving supratentorial white matter, stable  No acute ischemic disease  Partially empty sella  Similar to previous study  Patient Active Problem List   Diagnosis    Benign meningioma [facility])     Past Medical History:   Diagnosis Date    Disease of thyroid gland     Hypertension     Migraine      Past Surgical History:   Procedure Laterality Date    CHOLECYSTECTOMY      [place] STEREOTACTIC RADIOSURGERY, CRANIAL,COMPLEX,SINGLE  7/6/2016         [place] STEREOTACTIC RADIOSURGERY, CRANIAL,COMPLEX,SINGLE  7/20/2016          Family History   Problem Relation Age of Onset    Breast cancer Mother      Social History     Social History    Marital status: /Civil Union     Spouse name: N/A    Number of children: N/A    Years of education: N/A     Occupational History    Not on file       Social History Main Topics    Smoking status: Never Smoker    Smokeless tobacco: Never Used    Alcohol use Yes      Comment: Socially    Drug use: No    Sexual activity: Yes     Other Topics Concern    Not on file     Social History Narrative    No narrative on file       Current Outpatient Prescriptions:     hydrochlorothiazide (HYDRODIURIL) 50 mg tablet, Take 50 mg by mouth daily, Disp: , Rfl:     levothyroxine 100 mcg tablet, Take 100 mcg by mouth daily, Disp: , Rfl:     rizatriptan (MAXALT-MLT) 10 MG disintegrating tablet, Take 1 tablet by mouth daily as needed, Disp: , Rfl:     aspirin 325 mg tablet, Take 325 mg by mouth daily, Disp: , Rfl:     naproxen (NAPROSYN) 500 mg tablet, Take 1 tablet by mouth 2 (two) times a day with meals, Disp: 30 tablet, Rfl: 0  Allergies   Allergen Reactions    Augmentin [Amoxicillin-Pot Clavulanate] Hives and GI Intolerance       Review of Systems:  Review of Systems   Constitutional: Negative  HENT: Negative  Eyes: Negative  Respiratory: Negative  Cardiovascular: Negative  Gastrointestinal: Positive for nausea (r/t to headaches)  Endocrine: Negative  Genitourinary: Negative  Musculoskeletal: Positive for neck pain  Skin: Negative  Allergic/Immunologic: Negative  Neurological: Positive for headaches (upper to back lobe pain avg 7/10 using Triptan or Tylenol)  Hematological: Bruises/bleeds easily  Psychiatric/Behavioral: Negative          Vitals:    01/31/18 0917   BP: 138/95   Pulse: 66   Resp: 16   Temp: 98 6 °F (37 °C)   SpO2: 96%         Additional Notes:

## 2018-01-31 NOTE — PROGRESS NOTES
Follow-Up - Radiation Oncology   Jayashree Narayanan 1960 56 y o  female [id number]        Chief Complaint   Patient presents with    Follow-up     benign neoplasm of cerebral meninges         No matching staging information was found for the patient  HPI: Jayashree Narayanan is a 56y o  year old female who presents with ***    Interval History:    Historical Information   Previous Oncology History:   Oncology History    Patient present today for SRT follow up last seen 7/19/17  Last treatment completed on 7/25/16 1/26/18: MRI  Impression: Stable right lateral foramen magnum enhancing mass consistent with meningioma, essentially unchanged  Mild chronic microangiopathic ischemic changes involving supratentorial white matter, stable  No acute ischemic disease  Partially empty sella  Similar to previous study             Past Medical History:   Diagnosis Date    Disease of thyroid gland     Hypertension     Migraine      No LMP recorded  Patient is not currently having periods (Reason: Premenopausal)    Past Surgical History:   Procedure Laterality Date    CHOLECYSTECTOMY      [place] STEREOTACTIC RADIOSURGERY, CRANIAL,COMPLEX,SINGLE  7/6/2016         [place] STEREOTACTIC RADIOSURGERY, CRANIAL,COMPLEX,SINGLE  7/20/2016            Social History   History   Alcohol Use    Yes     Comment: Socially     History   Drug Use No     History   Smoking Status    Never Smoker   Smokeless Tobacco    Never Used         Meds/Allergies     Current Outpatient Prescriptions:     hydrochlorothiazide (HYDRODIURIL) 50 mg tablet, Take 50 mg by mouth daily, Disp: , Rfl:     levothyroxine 100 mcg tablet, Take 100 mcg by mouth daily, Disp: , Rfl:     rizatriptan (MAXALT-MLT) 10 MG disintegrating tablet, Take 1 tablet by mouth daily as needed, Disp: , Rfl:     acetaminophen (TYLENOL) 325 mg suppository, Insert 325 mg into the rectum every 4 (four) hours as needed for mild pain, Disp: , Rfl:     aspirin 325 mg tablet, Take 325 mg by mouth daily, Disp: , Rfl:     naproxen (NAPROSYN) 500 mg tablet, Take 1 tablet by mouth 2 (two) times a day with meals, Disp: 30 tablet, Rfl: 0  Allergies   Allergen Reactions    Augmentin [Amoxicillin-Pot Clavulanate] Hives and GI Intolerance         Review of Systems    Objective   /95 (BP Location: Left arm, Patient Position: Sitting, Cuff Size: Large)   Pulse 66   Temp 98 6 °F (37 °C) (Temporal)   Resp 16   Ht 5' 2" (1 575 m)   Wt 91 2 kg (201 lb)   SpO2 96%   BMI 36 76 kg/m²     ECOG:  {performance status:94051}      Physical Exam      No results found for this or any previous visit (from the past 672 hour(s))  Mri Brain W Wo Contrast    Result Date: 1/26/2018  Narrative: MRI BRAIN WITH AND WITHOUT CONTRAST INDICATION:  56-year-old female, tumor resection, radiation therapy, follow-up COMPARISON:  7/13/2017 MRI TECHNIQUE: Sagittal T1, axial T2, axial FLAIR, axial T1, axial Cameron, axial diffusion  Sagittal, axial and coronal T1 postcontrast   Axial BRAVO post contrast   IV Contrast:  9 mL of gadobutrol injection (MULTI-DOSE)  IMAGE QUALITY:   Diagnostic  FINDINGS: BRAIN PARENCHYMA: Densely enhancing lenticular shaped dural based mass is present within the right lateral aspect of the foramen magnum  The mass is seen to measure approximately 7 mm thick x 2 8 cm diameter  The lesion remains contiguous with the lateral aspect of the brainstem at the cervical medullary junction and the right cerebellar tonsil  Suspected extension into the mildly widened right hypoglossal canal   Findings are stable and remain consistent with residual meningioma  Mild chronic microangiopathic ischemic changes involve supratentorial white matter, similar to previous  No acute ischemic disease identified There is no intracranial hemorrhage  There is no evidence of acute infarction and diffusion imaging is unremarkable    VENTRICLES:  Normal  SELLA AND PITUITARY GLAND:  Partially empty sella again evident ORBITS:  Normal  PARANASAL SINUSES:  Normal  VASCULATURE:  Small right vertebral artery displaced or encased by foramen magnum meningioma, unchanged  CALVARIUM AND SKULL BASE:  Partially of marrow fat in the right occipital condyle and right lateral clivus consistent with previous radiation therapy, unchanged  EXTRACRANIAL SOFT TISSUES:  Normal      Impression: Stable right lateral foramen magnum enhancing mass consistent with meningioma, essentially unchanged  Mild chronic microangiopathic ischemic changes involving supratentorial white matter, stable No acute ischemic disease Partially empty sella Similar to previous study Workstation performed: [id number]           Assessment/Plan      Assessment:  ***  Orders Placed This Encounter   Procedures    MRI brain w wo contrast     Standing Status:   Future     Standing Expiration Date:   1/31/2022     Scheduling Instructions: There is no preparation for this test  Please leave your jewelry and valuables at home, wedding rings are the exception  Please bring your physician order, insurance cards, a form of photo ID and a list of your medications with you  Arrive 15 minutes prior to your appointment time in order to register  Please bring any prior CT or MRI studies of this area that were not performed at a [facility]  To schedule this appointment, please contact Central Scheduling at [phone]  Order Specific Question:   Is the patient pregnant? Answer:   Unknown     Order Specific Question:   What is the patient's sedation requirement?      Answer:   No Sedation    Creatinine, serum     Standing Status:   Future     Number of Occurrences:   1     Standing Expiration Date:   1/31/2019    BUN     Standing Status:   Future     Number of Occurrences:   1     Standing Expiration Date:   1/31/2019       Plan:  ***    Orders Placed This Encounter   Procedures    MRI brain w wo contrast    Creatinine, serum    BUN

## 2018-01-31 NOTE — PROGRESS NOTES
Follow-Up - Radiation Oncology   Oscar Roman 1960 56 y o  female [id number]        Chief Complaint   Patient presents with    Follow-up     benign neoplasm of cerebral meninges             HPI: Oscar Roman is a 50year old female who to have a right-sided CPA mass in May of 2014  This caused compression of her hypoglossal nerve and brainstem  She underwent suboccipital craniotomy and C1 laminectomy on 6/16/2014  The pathology was consistent with meningioma who grade 1  MRI of the brain from 5/16/2016 demonstrated enlarging meningioma at the level of the right side of the foramen magnum in comparison to her MRI from 05/2015  He was however smaller than on her preop MRI from 4/10/2014  She also had intermittent headaches  She was referred to the Neuro-Oncology working group    Interval History:  She has occasional headaches  Overall she feels well  In the last several weeks she has noted increase headache she feels related to her sinus pressure which improved with over the counter medication  No falls or seizures    Historical Information   Previous Oncology History:   Oncology History    Patient present today for SRT follow up last seen 7/19/17  Last treatment completed on 7/25/16 1/26/18: MRI  Impression: Stable right lateral foramen magnum enhancing mass consistent with meningioma, essentially unchanged  Mild chronic microangiopathic ischemic changes involving supratentorial white matter, stable  No acute ischemic disease  Partially empty sella  Similar to previous study             Past Medical History:   Diagnosis Date    Disease of thyroid gland     Hypertension     Migraine      No LMP recorded  Patient is not currently having periods (Reason: Premenopausal)    Past Surgical History:   Procedure Laterality Date    CHOLECYSTECTOMY      [place] STEREOTACTIC RADIOSURGERY, CRANIAL,COMPLEX,SINGLE  7/6/2016         MI STEREOTACTIC RADIOSURGERY, CRANIAL,COMPLEX,SINGLE  7/20/2016 Social History   History   Alcohol Use    Yes     Comment: Socially     History   Drug Use No     History   Smoking Status    Never Smoker   Smokeless Tobacco    Never Used         Meds/Allergies     Current Outpatient Prescriptions:     hydrochlorothiazide (HYDRODIURIL) 50 mg tablet, Take 50 mg by mouth daily, Disp: , Rfl:     levothyroxine 100 mcg tablet, Take 100 mcg by mouth daily, Disp: , Rfl:     rizatriptan (MAXALT-MLT) 10 MG disintegrating tablet, Take 1 tablet by mouth daily as needed, Disp: , Rfl:     aspirin 325 mg tablet, Take 325 mg by mouth daily, Disp: , Rfl:     naproxen (NAPROSYN) 500 mg tablet, Take 1 tablet by mouth 2 (two) times a day with meals, Disp: 30 tablet, Rfl: 0  Allergies   Allergen Reactions    Augmentin [Amoxicillin-Pot Clavulanate] Hives and GI Intolerance         Review of Systems   Constitutional: Negative  HENT: Positive for sinus pressure  Negative for sinus pain  Eyes: Negative  Respiratory: Negative  Cardiovascular: Negative  Gastrointestinal: Negative  Genitourinary: Negative  Neurological: Positive for headaches ( she has occasional intermittent headaches)  Objective   /95 (BP Location: Left arm, Patient Position: Sitting, Cuff Size: Large)   Pulse 66   Temp 98 6 °F (37 °C) (Temporal)   Resp 16   Ht 5' 2" (1 575 m)   Wt 91 2 kg (201 lb)   SpO2 96%   BMI 36 76 kg/m²     ECO - Asymptomatic      Physical Exam   She is conversing appropriately  She has right tongue deviation  Muscle strength in the upper lower extremity symmetric bilaterally  Cardiac regular rhythm  Lungs clear  Abdomen soft nontender  She is ambulating independently without significant imbalance          Mri Brain W Wo Contrast    Result Date: 2018  Narrative: MRI BRAIN WITH AND WITHOUT CONTRAST INDICATION:  81-year-old female, tumor resection, radiation therapy, follow-up COMPARISON:  2017 MRI TECHNIQUE: Sagittal T1, axial T2, axial FLAIR, axial T1, axial [place], axial diffusion  Sagittal, axial and coronal T1 postcontrast   Axial BRAVO post contrast   IV Contrast:  9 mL of gadobutrol injection (MULTI-DOSE)  IMAGE QUALITY:   Diagnostic  FINDINGS: BRAIN PARENCHYMA: Densely enhancing lenticular shaped dural based mass is present within the right lateral aspect of the foramen magnum  The mass is seen to measure approximately 7 mm thick x 2 8 cm diameter  The lesion remains contiguous with the lateral aspect of the brainstem at the cervical medullary junction and the right cerebellar tonsil  Suspected extension into the mildly widened right hypoglossal canal   Findings are stable and remain consistent with residual meningioma  Mild chronic microangiopathic ischemic changes involve supratentorial white matter, similar to previous  No acute ischemic disease identified There is no intracranial hemorrhage  There is no evidence of acute infarction and diffusion imaging is unremarkable  VENTRICLES:  Normal  SELLA AND PITUITARY GLAND:  Partially empty sella again evident ORBITS:  Normal  PARANASAL SINUSES:  Normal  VASCULATURE:  Small right vertebral artery displaced or encased by foramen magnum meningioma, unchanged  CALVARIUM AND SKULL BASE:  Partially of marrow fat in the right occipital condyle and right lateral clivus consistent with previous radiation therapy, unchanged  EXTRACRANIAL SOFT TISSUES:  Normal      Impression: Stable right lateral foramen magnum enhancing mass consistent with meningioma, essentially unchanged  Mild chronic microangiopathic ischemic changes involving supratentorial white matter, stable No acute ischemic disease Partially empty sella Similar to previous study Workstation performed: [id number]           Assessment/Plan      Assessment:  Meningioma    Plan:  Chavez Charles returns for follow-up visit today  She is 1-1/2 years post radiation therapy for grade 1 meningioma in the region of the foramen magnum    Her MRI shows stability  I have ordered follow-up MRI for her in 6 months and she will return to Neuro-Oncology Clinic thereafter      Orders Placed This Encounter   Procedures    MRI brain w wo contrast    Creatinine, serum    BUN

## 2018-02-02 ENCOUNTER — OFFICE VISIT (OUTPATIENT)
Dept: URGENT CARE | Facility: CLINIC | Age: 58
End: 2018-02-02
Payer: COMMERCIAL

## 2018-02-02 ENCOUNTER — TELEPHONE (OUTPATIENT)
Dept: FAMILY MEDICINE CLINIC | Facility: CLINIC | Age: 58
End: 2018-02-02

## 2018-02-02 VITALS
HEART RATE: 78 BPM | RESPIRATION RATE: 20 BRPM | DIASTOLIC BLOOD PRESSURE: 100 MMHG | TEMPERATURE: 97.4 F | SYSTOLIC BLOOD PRESSURE: 150 MMHG | OXYGEN SATURATION: 98 %

## 2018-02-02 DIAGNOSIS — G43.909 MIGRAINE WITHOUT STATUS MIGRAINOSUS, NOT INTRACTABLE, UNSPECIFIED MIGRAINE TYPE: Primary | ICD-10-CM

## 2018-02-02 DIAGNOSIS — G43.919 INTRACTABLE MIGRAINE WITHOUT STATUS MIGRAINOSUS, UNSPECIFIED MIGRAINE TYPE: Primary | ICD-10-CM

## 2018-02-02 PROCEDURE — G0382 LEV 3 HOSP TYPE B ED VISIT: HCPCS | Performed by: PHYSICIAN ASSISTANT

## 2018-02-02 RX ORDER — RIZATRIPTAN BENZOATE 10 MG/1
10 TABLET, ORALLY DISINTEGRATING ORAL DAILY PRN
Qty: 9 TABLET | Refills: 0 | Status: SHIPPED | OUTPATIENT
Start: 2018-02-02 | End: 2018-05-17 | Stop reason: SDUPTHER

## 2018-02-02 RX ORDER — KETOROLAC TROMETHAMINE 30 MG/ML
30 INJECTION, SOLUTION INTRAMUSCULAR; INTRAVENOUS ONCE
Status: COMPLETED | OUTPATIENT
Start: 2018-02-02 | End: 2018-02-02

## 2018-02-02 RX ORDER — DIPHENHYDRAMINE HYDROCHLORIDE 50 MG/ML
50 INJECTION INTRAMUSCULAR; INTRAVENOUS EVERY 6 HOURS PRN
Status: SHIPPED | OUTPATIENT
Start: 2018-02-02

## 2018-02-02 RX ORDER — ONDANSETRON 8 MG/1
8 TABLET, ORALLY DISINTEGRATING ORAL ONCE
Status: COMPLETED | OUTPATIENT
Start: 2018-02-02 | End: 2018-02-02

## 2018-02-02 RX ADMIN — KETOROLAC TROMETHAMINE 30 MG: 30 INJECTION, SOLUTION INTRAMUSCULAR; INTRAVENOUS at 14:43

## 2018-02-02 RX ADMIN — ONDANSETRON 8 MG: 8 TABLET, ORALLY DISINTEGRATING ORAL at 14:40

## 2018-02-02 RX ADMIN — DIPHENHYDRAMINE HYDROCHLORIDE 50 MG: 50 INJECTION INTRAMUSCULAR; INTRAVENOUS at 14:40

## 2018-02-02 NOTE — PROGRESS NOTES
[address]  (office) [phone]  (fax) [phone]        NAME: Kain Bhakta is a 56 y o  female  : 1960    MRN: [id number]  DATE: 2018  TIME: 3:26 PM    Assessment and Plan   Intractable migraine without status migrainosus, unspecified migraine type [G43 919]  1  Intractable migraine without status migrainosus, unspecified migraine type  ondansetron (ZOFRAN-ODT) dispersible tablet 8 mg    diphenhydrAMINE (BENADRYL) injection 50 mg    ketorolac (TORADOL) injection 30 mg         Patient Instructions   Patient tolerated medications well and migraine was much improved  Her  is to drive her home  She is to remain hydrated and to follow up with PCP/Neurologist in 1-2 days to follow up on her migraine and her elevated BP  Patient did verbalize understanding  To present to the ER if symptoms worsen  Chief Complaint     Chief Complaint   Patient presents with    Migraine    Nausea    Vomiting     Woke this am with Migraine took her medication for migraine at 9am and 1 pm is currently vomiting during triage  Monica Ward LPN         History of Present Illness   Kain Bhakta presents to the clinic c/o    Migraine    This is a new problem  The current episode started today  The problem occurs constantly  The problem has been unchanged  The pain is located in the left unilateral and occipital region  The pain does not radiate  The pain quality is similar to prior headaches  The quality of the pain is described as aching  The pain is at a severity of 7/10  The pain is moderate  Associated symptoms include nausea, phonophobia, photophobia and vomiting  Pertinent negatives include no abdominal pain, back pain, blurred vision, coughing, dizziness, ear pain, eye pain, eye redness, facial sweating, fever, hearing loss, loss of balance, neck pain, rhinorrhea, seizures, sinus pressure, sore throat, visual change or weakness   The symptoms are aggravated by noise and bright light  She has tried triptans for the symptoms  The treatment provided no relief  Her past medical history is significant for hypertension and migraine headaches  Nausea   This is a new problem  The current episode started today  The problem occurs constantly  The problem has been unchanged  Associated symptoms include headaches, nausea and vomiting  Pertinent negatives include no abdominal pain, arthralgias, chest pain, chills, congestion, coughing, diaphoresis, fatigue, fever, joint swelling, myalgias, neck pain, rash, sore throat, vertigo, visual change or weakness  She has tried nothing for the symptoms  Vomiting    This is a new problem  The current episode started today  The problem has been unchanged  The emesis has an appearance of stomach contents  There has been no fever  Associated symptoms include headaches  Pertinent negatives include no abdominal pain, arthralgias, chest pain, chills, coughing, diarrhea, dizziness, fever or myalgias  She has tried nothing for the symptoms  Review of Systems   Review of Systems   Constitutional: Negative for activity change, appetite change, chills, diaphoresis, fatigue and fever  HENT: Negative for congestion, ear discharge, ear pain, facial swelling, hearing loss, rhinorrhea, sinus pain, sinus pressure, sneezing and sore throat  Eyes: Positive for photophobia  Negative for blurred vision, pain, discharge, redness, itching and visual disturbance  Respiratory: Negative for apnea, cough, chest tightness, shortness of breath and wheezing  Cardiovascular: Negative for chest pain  Gastrointestinal: Positive for nausea and vomiting  Negative for abdominal distention, abdominal pain, anal bleeding, blood in stool, constipation and diarrhea  Genitourinary: Negative for dysuria, flank pain, frequency, hematuria and urgency     Musculoskeletal: Negative for arthralgias, back pain, gait problem, joint swelling, myalgias, neck pain and neck stiffness  Skin: Negative for color change, rash and wound  Allergic/Immunologic: Negative for immunocompromised state  Neurological: Positive for headaches  Negative for dizziness, vertigo, seizures, facial asymmetry, weakness and loss of balance  Hematological: Negative for adenopathy  Psychiatric/Behavioral: Negative for confusion and decreased concentration  Current Medications     Long-Term Prescriptions   Medication Sig Dispense Refill    aspirin 325 mg tablet Take 325 mg by mouth daily      hydrochlorothiazide (HYDRODIURIL) 50 mg tablet Take 50 mg by mouth daily      levothyroxine 100 mcg tablet Take 100 mcg by mouth daily      naproxen (NAPROSYN) 500 mg tablet Take 1 tablet by mouth 2 (two) times a day with meals 30 tablet 0    rizatriptan (MAXALT-MLT) 10 MG disintegrating tablet Take 1 tablet by mouth daily as needed         Current Allergies     Allergies as of 02/02/2018 - Reviewed 02/02/2018   Allergen Reaction Noted    Augmentin [amoxicillin-pot clavulanate] Hives and GI Intolerance 08/13/2017            The following portions of the patient's history were reviewed and updated as appropriate: allergies, current medications, past family history, past medical history, past social history, past surgical history and problem list     Objective   /100 (BP Location: Right arm, Patient Position: Sitting, Cuff Size: Standard)   Pulse 78   Temp (!) 97 4 °F (36 3 °C) (Tympanic)   Resp 20   SpO2 98%        Physical Exam     Physical Exam   Constitutional: She is oriented to person, place, and time  She appears well-developed and well-nourished  No distress  HENT:   Head: Normocephalic and atraumatic  Right Ear: Tympanic membrane and external ear normal    Left Ear: Tympanic membrane and external ear normal    Nose: Nose normal    Mouth/Throat: Oropharynx is clear and moist  No oropharyngeal exudate     Eyes: Conjunctivae and EOM are normal  Pupils are equal, round, and reactive to light  Right eye exhibits no discharge  Left eye exhibits no discharge  No scleral icterus  Neck: Normal range of motion  Neck supple  No JVD present  No tracheal deviation present  No thyromegaly present  Cardiovascular: Normal rate, regular rhythm and normal heart sounds  Exam reveals no gallop and no friction rub  No murmur heard  Pulmonary/Chest: Effort normal and breath sounds normal  No stridor  No respiratory distress  She has no wheezes  She has no rales  She exhibits no tenderness  Abdominal: Soft  Bowel sounds are normal  She exhibits no distension and no mass  There is no tenderness  There is no rebound and no guarding  Vomited while in office    Musculoskeletal: Normal range of motion  She exhibits no tenderness or deformity  Lymphadenopathy:     She has no cervical adenopathy  Neurological: She is alert and oriented to person, place, and time  She has normal reflexes  No cranial nerve deficit  Coordination normal    Skin: Skin is warm and dry  No rash noted  She is not diaphoretic  No erythema  No pallor  Psychiatric: She has a normal mood and affect  Her behavior is normal  Judgment and thought content normal    Nursing note and vitals reviewed        Lisa Lopez PA-C

## 2018-02-02 NOTE — PATIENT INSTRUCTIONS
Patient tolerated medications well and migraine was much improved  Her  is to drive her home  She is to remain hydrated and to follow up with PCP/Neurologist in 1-2 days to follow up on her migraine and her elevated BP  Patient did verbalize understanding  To present to the ER if symptoms worsen

## 2018-02-16 DIAGNOSIS — D32.9 BENIGN NEOPLASM OF MENINGES (HCC): Primary | ICD-10-CM

## 2018-02-20 ENCOUNTER — HOSPITAL ENCOUNTER (OUTPATIENT)
Dept: ULTRASOUND IMAGING | Facility: MEDICAL CENTER | Age: 58
Discharge: HOME | End: 2018-02-20
Payer: COMMERCIAL

## 2018-02-20 DIAGNOSIS — N83.209 CYST OF OVARY: ICD-10-CM

## 2018-02-20 PROCEDURE — 76830 TRANSVAGINAL US NON-OB: CPT

## 2018-02-20 PROCEDURE — 76856 US EXAM PELVIC COMPLETE: CPT

## 2018-02-23 ENCOUNTER — OFFICE VISIT (OUTPATIENT)
Dept: OBGYN CLINIC | Facility: MEDICAL CENTER | Age: 58
End: 2018-02-23

## 2018-02-23 VITALS — BODY MASS INDEX: 37.02 KG/M2 | DIASTOLIC BLOOD PRESSURE: 80 MMHG | WEIGHT: 202.4 LBS | SYSTOLIC BLOOD PRESSURE: 122 MMHG

## 2018-02-23 DIAGNOSIS — Z09 FOLLOW UP: Primary | ICD-10-CM

## 2018-03-12 ENCOUNTER — OFFICE VISIT (OUTPATIENT)
Dept: OBGYN CLINIC | Facility: MEDICAL CENTER | Age: 58
End: 2018-03-12
Payer: COMMERCIAL

## 2018-03-12 VITALS — BODY MASS INDEX: 37.39 KG/M2 | WEIGHT: 204.4 LBS

## 2018-03-12 DIAGNOSIS — Z12.31 ENCOUNTER FOR SCREENING MAMMOGRAM FOR MALIGNANT NEOPLASM OF BREAST: ICD-10-CM

## 2018-03-12 DIAGNOSIS — Z01.419 ENCNTR FOR GYN EXAM (GENERAL) (ROUTINE) W/O ABN FINDINGS: Primary | ICD-10-CM

## 2018-03-12 PROBLEM — R93.89 THICKENED ENDOMETRIUM: Status: ACTIVE | Noted: 2017-08-16

## 2018-03-12 PROCEDURE — 99396 PREV VISIT EST AGE 40-64: CPT | Performed by: NURSE PRACTITIONER

## 2018-03-12 RX ORDER — LEVOTHYROXINE SODIUM 50 MCG
50 TABLET ORAL DAILY
COMMUNITY
Start: 2018-02-28 | End: 2018-11-23 | Stop reason: SDUPTHER

## 2018-03-12 NOTE — PROGRESS NOTES
ASSESSMENT & PLAN: Kain Bhakta is a 55 yo  female who presents for her  gynecologic exam     1   Routine well woman exam done today  2  Pap and HPV:  The patient's Pap and cotesting was not done today  Current ASCCP Guidelines reviewed  Due again   3   Mammogram ordered, due in May 2018  4   Colonoscopy will check with pcp as to when due again  4  The following were reviewed in today's visit: breast self exam, mammography screening ordered, menopause, adequate intake of calcium and vitamin D, exercise, healthy diet and weight loss  Discussed with her intermittent fasting as means to lose weight that might be able to fit into her work schedule  Enc  Exercise videos at home  5  Will rto one year annual, call me sooner w any concerns  States she is due to see pcp now and will get thyroid and other labs as indicated  CC:  Annual Gynecologic Examination    HPI: Kain Bhakta is a 56 y o   female  who presents for annual gynecologic examination  She has the following concerns: feels cold all the time and her feet hurt  States hasn't seen pcp in ~ one year, due for appt  And will discuss these concerns with him  She has no breast or pelvic complaints  Health Maintenance:    She wears her seatbelt routinely  She does perform regular monthly self breast exams  She feels safe at home  Pap: 2017  Last mammogram: 2017  Last colonoscopy: unsure of date      Past Medical History:   Diagnosis Date    Disease of thyroid gland     Hypertension     Migraine        Past Surgical History:   Procedure Laterality Date    CHOLECYSTECTOMY      CRANIOTOMY  2014    Suboccipital craniotomy and C1 laminectomy for 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     GALLBLADDER SURGERY  2001    Laparoscopic     LA STEREOTACTIC RADIOSURGERY, CRANIAL,COMPLEX,SINGLE  2016         LA STEREOTACTIC RADIOSURGERY, CRANIAL,COMPLEX,SINGLE  2016         TUBAL LIGATION  1992       Past OB/Gyn History:  OB History     No data available          Pt does not have menstrual issues  LMP= ~ 2 years ago  History of sexually transmitted infection: No   History of abnormal pap smears: No      Patient is not currently sexually active  The current method of family planning is post menopausal status  Family History   Problem Relation Age of Onset   Gabo Nash Breast cancer Mother     Diabetes Family     Heart attack Family     Multiple sclerosis Family        Social History:  Social History     Social History    Marital status: /Civil Union     Spouse name: N/A    Number of children: N/A    Years of education: N/A     Occupational History           Social History Main Topics    Smoking status: Never Smoker    Smokeless tobacco: Never Used    Alcohol use Yes      Comment: Socially    Drug use: No    Sexual activity: Yes     Other Topics Concern    Not on file     Social History Narrative    No narrative on file     Presently lives with  Patient is     Patient is currently employed   Allergies   Allergen Reactions    Augmentin [Amoxicillin-Pot Clavulanate] Hives and GI Intolerance       Current Outpatient Prescriptions:     acetaminophen (TYLENOL) 325 mg suppository, Insert 325 mg into the rectum every 4 (four) hours as needed for mild pain, Disp: , Rfl:     aspirin 325 mg tablet, Take 325 mg by mouth daily, Disp: , Rfl:     hydrochlorothiazide (HYDRODIURIL) 50 mg tablet, Take 50 mg by mouth daily, Disp: , Rfl:     levothyroxine 100 mcg tablet, Take 100 mcg by mouth daily, Disp: , Rfl:     naproxen (NAPROSYN) 500 mg tablet, Take 1 tablet by mouth 2 (two) times a day with meals, Disp: 30 tablet, Rfl: 0    rizatriptan (MAXALT-MLT) 10 MG disintegrating tablet, Take 1 tablet (10 mg total) by mouth daily as needed for migraine, Disp: 9 tablet, Rfl: 0    SYNTHROID 50 MCG tablet, , Disp: , Rfl:     Current Facility-Administered Medications:     diphenhydrAMINE (BENADRYL) injection 50 mg, 50 mg, Intramuscular, Q6H PRN, Shayy Colón PA-C, 50 mg at 02/02/18 1440      Review of Systems  Constitutional :no fever, feels well, no tiredness, no recent weight gain or loss  ENT: no ear ache, no loss of hearing, no nosebleeds or nasal discharge, no sore throat or hoarseness  Cardiovascular: no complaints of slow or fast heart beat, no chest pain, no palpitations, no leg claudication or lower extremity edema  Respiratory: no complaints of shortness of shortness of breath, no BARON  Breasts:no complaints of breast pain, breast lump, or nipple discharge  Gastrointestinal: no complaints of abdominal pain, constipation,nausea, vomiting, or diarrhea or bloody stools  Genitourinary : no complaints of dysuria, incontinence, pelvic pain, no dysmenorrhea, vaginal discharge or abnormal vaginal bleeding and as noted in HPI  Integumentary: no complaints of skin rash or lesion, itching or dry skin  Neurological: no complaints of headache, no confusion, no numbness or tingling, no dizziness or fainting    Objective      Wt 92 7 kg (204 lb 6 4 oz)   BMI 37 39 kg/m²     General:   appears stated age, cooperative, alert normal mood and affect   Neck: Neck: normal, supple,trachea midline, no masses   Heart: regular rate and rhythm, S1, S2 normal, no murmur, click, rub or gallop   Lungs: clear to auscultation bilaterally   Breasts: normal appearance, no masses or tenderness   Abdomen: soft, non-tender, without masses or organomegaly   Vulva: normal   Vagina: normal vagina, no discharge, exudate, lesion, or erythema   Urethra: normal   Cervix: Normal, no discharge     Uterus: normal size, contour, position, consistency, mobility, non-tender   Adnexa: normal adnexa

## 2018-04-02 ENCOUNTER — OFFICE VISIT (OUTPATIENT)
Dept: FAMILY MEDICINE CLINIC | Facility: CLINIC | Age: 58
End: 2018-04-02
Payer: COMMERCIAL

## 2018-04-02 VITALS
RESPIRATION RATE: 16 BRPM | WEIGHT: 202 LBS | HEIGHT: 64 IN | TEMPERATURE: 97.4 F | OXYGEN SATURATION: 96 % | DIASTOLIC BLOOD PRESSURE: 80 MMHG | BODY MASS INDEX: 34.49 KG/M2 | SYSTOLIC BLOOD PRESSURE: 130 MMHG | HEART RATE: 83 BPM

## 2018-04-02 DIAGNOSIS — M25.551 RIGHT HIP PAIN: Primary | ICD-10-CM

## 2018-04-02 PROCEDURE — 99213 OFFICE O/P EST LOW 20 MIN: CPT | Performed by: FAMILY MEDICINE

## 2018-04-02 RX ORDER — PREDNISONE 10 MG/1
TABLET ORAL
Qty: 33 TABLET | Refills: 0 | Status: SHIPPED | OUTPATIENT
Start: 2018-04-02 | End: 2018-08-01 | Stop reason: ALTCHOICE

## 2018-04-02 RX ORDER — MELOXICAM 15 MG/1
15 TABLET ORAL DAILY
Qty: 30 TABLET | Refills: 0 | Status: SHIPPED | OUTPATIENT
Start: 2018-04-02 | End: 2018-08-02

## 2018-04-02 NOTE — PROGRESS NOTES
Assessment/Plan:    No problem-specific Assessment & Plan notes found for this encounter  {Assess/PlanSmartLinks:96171}      Subjective:      Patient ID: Marti Muñoz is a 56 y o  female  HPI    {Common ambulatory SmartLinks:43551}    Review of Systems   Constitutional: Negative  HENT: Negative  Eyes: Negative  Respiratory: Negative  Cardiovascular: Negative  Gastrointestinal: Negative  Endocrine: Negative  Genitourinary: Negative  Musculoskeletal: Negative  Skin: Negative  Allergic/Immunologic: Negative  Neurological: Negative  Hematological: Negative  Psychiatric/Behavioral: Negative            Objective:      /80 (BP Location: Left arm, Patient Position: Sitting, Cuff Size: Large)   Pulse 83   Temp (!) 97 4 °F (36 3 °C)   Resp 16   Ht 5' 3 78" (1 62 m)   Wt 91 6 kg (202 lb)   SpO2 96%   BMI 34 91 kg/m²          Physical Exam

## 2018-04-02 NOTE — PROGRESS NOTES
Assessment/Plan:  No significant findings on physical exam   Recommended meloxicam daily for the next 1-2 weeks  If no improvement I gave her prescription for prednisone to start  Script also given for physical therapy  Recommend orthopedic evaluation if no improvement in symptoms  Card given for [employer]  No problem-specific Assessment & Plan notes found for this encounter  Diagnoses and all orders for this visit:    Right hip pain  -     Ambulatory referral to Physical Therapy; Future  -     meloxicam (MOBIC) 15 mg tablet; Take 1 tablet (15 mg total) by mouth daily for 30 days  -     predniSONE 10 mg tablet; 6 tablets daily, all at one time, with food  Then on day #4 decrease by 1 pill each day until finished  Subjective:      Patient ID: Lesia Bazan is a 56 y o  female  Patient is here for right-sided hip pain for 1-2 weeks  She denies any trauma  Pain occasionally radiates to the SI joint on the right hand side of the back  Pain also occasionally radiates down the right hand side of the right leg  She states right hip is uncomfortable to lay on at times  She is on her feet on a concrete floor at work often throughout the day  She works 8 hours or more in a shift  Denies any trauma  Denies left-sided pain  No bowel or bladder symptoms  Hip Pain          The following portions of the patient's history were reviewed and updated as appropriate: allergies, current medications, past family history, past medical history, past social history, past surgical history and problem list     Review of Systems   Constitutional: Negative  HENT: Negative  Eyes: Negative  Respiratory: Negative  Cardiovascular: Negative  Gastrointestinal: Negative  Endocrine: Negative  Genitourinary: Negative  Musculoskeletal: Positive for arthralgias  Skin: Negative  Allergic/Immunologic: Negative  Neurological: Negative  Hematological: Negative  Psychiatric/Behavioral: Negative  Objective:      /80 (BP Location: Left arm, Patient Position: Sitting, Cuff Size: Large)   Pulse 83   Temp (!) 97 4 °F (36 3 °C)   Resp 16   Ht 5' 3 78" (1 62 m)   Wt 91 6 kg (202 lb)   SpO2 96%   BMI 34 91 kg/m²          Physical Exam   Constitutional: She is oriented to person, place, and time  She appears well-developed and well-nourished  HENT:   Head: Normocephalic and atraumatic  Right Ear: External ear normal  Tympanic membrane is not erythematous and not bulging  Left Ear: External ear normal  Tympanic membrane is not erythematous and not bulging  Nose: Nose normal    Mouth/Throat: Oropharynx is clear and moist and mucous membranes are normal  No oral lesions  No oropharyngeal exudate  Eyes: Conjunctivae and EOM are normal  Right eye exhibits no discharge  Left eye exhibits no discharge  No scleral icterus  Neck: Normal range of motion  Neck supple  No thyromegaly present  Cardiovascular: Normal rate, regular rhythm and normal heart sounds  Exam reveals no gallop and no friction rub  No murmur heard  Pulmonary/Chest: Effort normal  No respiratory distress  She has no wheezes  She has no rales  She exhibits no tenderness  Abdominal: Soft  Bowel sounds are normal  She exhibits no distension and no mass  There is no tenderness  There is no rebound and no guarding  Musculoskeletal: Normal range of motion  She exhibits no edema, tenderness or deformity  Lymphadenopathy:     She has no cervical adenopathy  Neurological: She is alert and oriented to person, place, and time  She has normal reflexes  No cranial nerve deficit  She exhibits normal muscle tone  Coordination normal    Skin: Skin is warm and dry  No rash noted  No erythema  No pallor  Psychiatric: She has a normal mood and affect  Her behavior is normal    Vitals reviewed

## 2018-04-26 ENCOUNTER — EVALUATION (OUTPATIENT)
Dept: PHYSICAL THERAPY | Facility: CLINIC | Age: 58
End: 2018-04-26
Payer: COMMERCIAL

## 2018-04-26 DIAGNOSIS — M25.551 RIGHT HIP PAIN: Primary | ICD-10-CM

## 2018-04-26 PROCEDURE — G8978 MOBILITY CURRENT STATUS: HCPCS

## 2018-04-26 PROCEDURE — G8979 MOBILITY GOAL STATUS: HCPCS

## 2018-04-26 PROCEDURE — 97140 MANUAL THERAPY 1/> REGIONS: CPT

## 2018-04-26 PROCEDURE — 97161 PT EVAL LOW COMPLEX 20 MIN: CPT

## 2018-04-26 NOTE — PROGRESS NOTES
PT Evaluation     Today's date: 2018  Patient name: Eunice Delaney  : 1960  MRN: [id number]  Referring provider: Christine Yuan DO  Dx:   Encounter Diagnosis     ICD-10-CM    1  Right hip pain M25 551                   Assessment  Impairments: abnormal gait, abnormal or restricted ROM, activity intolerance, impaired balance, impaired physical strength, lacks appropriate home exercise program and pain with function    Assessment details: Eunice Delaney is a 56 y o  female presents with dx R hip pain  Presents with R lumbopelvic hip dysfunction, functional LLD correctable with self MET today  L lumbar rotation ROM improved to 71 deg with mild R lower back discomfort following manual therapy  Gait less antalgic, less painful to end tx, with pt describing "right low back tightness, not pain" to end tx  Eunice Delaney has the above listed impairments and will benefit from skilled PT to improve deficits to return to prior level of function  Eunice Delaney was educated on eval findings and plan for management, lumbopelvic hip anatomy, safe ice/heat  HEP initiated  Emily Swann would benefit from skilled services to improve ROM, strength, flexibility, and function, and to decrease pain      Understanding of Dx/Px/POC: good   Prognosis: good    Goals  2 wks  - No functional LLD  - No pain > 4/10  - Increase strength 1/3 grade  - Increase lumbar ROM at least 10 deg where applicable  - Increase ambulation tolerance at least 50%    4-6 wks  - Pain 0/10  - Strength 5/5  - Lumbar and R hip ROM WFL and painfree  - Independent with HEP for self management  - Functional Status Measure at least 69  - Return to baseline tolerance for ambulation      Plan  Patient would benefit from: skilled PT  Planned modality interventions: cryotherapy and thermotherapy: hydrocollator packs  Planned therapy interventions: flexibility, joint mobilization, manual therapy, patient education, stretching, strengthening, therapeutic exercise and home exercise program  Frequency: 2x week  Duration in visits: 8  Duration in weeks: 4  Treatment plan discussed with: patient        Subjective Evaluation    History of Present Illness  Mechanism of injury: Pt reporting late 2018 insidious onset R low back pain radiating to R inguinal area  "Now it just kind of feels achy "  Notes getting hit by a cart at work mid 2018, striking L lower leg   "I don't know if that shifted me or what "  Did not report this incident as a W/C injury  Functional limitations: pain with walking, "I don't have full range" R hip  Difficulty getting comfortable for sleep  "I'm a whiner when I get achy "  Denies having saddle paresthesias, sx distal to R hip/inguinal, bowel/bladder changes, or constitutional sx  No prior low back or R hip issues  Pain  Current pain ratin  At best pain ratin  At worst pain ratin  Location: R LBP, R inguinal   Quality: sharp and dull ache  Aggravating factors: walking    Social Support  Steps to enter house: yes  3  Stairs in house: no   L[facility] in: [facility]  Lives with: spouse    Employment status: working (Supervisor: commercial laundry; prolonged walking)    Diagnostic Tests  No diagnostic tests performed  Treatments  Previous treatment: medication (Prednisone)  Current treatment: medication  Current treatment comments: Meloxicam      Patient Goals  Patient goals for therapy: decreased pain, independence with ADLs/IADLs, return to sport/leisure activities, increased motion, increased strength and improved balance  Patient goal: Bike, hike, "do things"        Objective     Gait: no AD, antalgic RLE  TUG: no AD, 8 35 sec  Posture: decreased thoracic kyphosis, increased lumbar lordosis  OH squat: mildly increased trunk flexion at hips  Lumbar ROM: flexion WNL, no effect (NE)  RFIS NE  Ext 10 deg, NE   ZAINA NE  SBR 18 deg, NE  SBL 20 deg, R LBP  R rotation 77 deg, NE   L rotation 65 deg, R LBP  Hip A/PROM:  B IR 45 deg  B ER WFL; R ER with R LBP  Other hip ROM WFL  R hip flexion PROM painful R LBP  Special tests: R stand march test (+) with R ant hip pain  RLE short in supine, long in long sit  R pubic tubercle superficial vs L  These with R inguinal/R LBP, (-) L: SLR, Gaenslen's, VERONICA, FADIR, thigh thrust   These (-) B: Jo's, hip scour  Palpation/joint mobilizations: R UPA L5 tender, reproducing R LBP  Other PA, UPA T10-L5 NE     MMT: B glute medius, glute max 4/5  Flexibility: B hamstrings, gastroc WNL  Hip flexors: R moderately tight, R LBP with testing  L hip flexor WNL      Flowsheet Rows      Most Recent Value   PT/OT G-Codes   Current Score  49   Projected Score  69   FOTO information reviewed  Yes   Assessment Type  Evaluation   G code set  Mobility: Walking & Moving Around   Mobility: Walking and Moving Around Current Status ()  CK   Mobility: Walking and Moving Around Goal Status ()  CJ          Precautions: benign stable brain tumor, balance affected    Daily Treatment Diary       Manuals 4/26 /18            L umbar rotation mob G3            R inom ant rot G3            R inom SCS ant rot 90"                         Exercise Diary              Self MET R inom post rot 5"x3            TA cx             Bridge             Clamshell             Tball squat             FSU             R kneel HF str             Prone plank             Prone swimmer                                                                                                                                                                                      Modalities

## 2018-04-29 DIAGNOSIS — M25.551 RIGHT HIP PAIN: ICD-10-CM

## 2018-04-30 RX ORDER — MELOXICAM 15 MG/1
15 TABLET ORAL DAILY
Qty: 30 TABLET | Refills: 0 | OUTPATIENT
Start: 2018-04-30 | End: 2018-05-30

## 2018-05-02 ENCOUNTER — OFFICE VISIT (OUTPATIENT)
Dept: PHYSICAL THERAPY | Facility: CLINIC | Age: 58
End: 2018-05-02
Payer: COMMERCIAL

## 2018-05-02 DIAGNOSIS — M25.551 RIGHT HIP PAIN: Primary | ICD-10-CM

## 2018-05-02 PROCEDURE — 97110 THERAPEUTIC EXERCISES: CPT

## 2018-05-02 PROCEDURE — 97140 MANUAL THERAPY 1/> REGIONS: CPT

## 2018-05-02 NOTE — PROGRESS NOTES
Daily Note     Today's date: 2018  Patient name: Marisa Bobo  : 1960  MRN: [id number]  Referring provider: Alaina Marin DO  Dx:   Encounter Diagnosis     ICD-10-CM    1  Right hip pain M25 551               Subjective: Pt reports decreased pain since IE but notes "achiness" since last night that is reduced post-manuals today  Objective: See treatment diary below  No LLD, level ASIS in supine  Precautions: benign stable brain tumor, balance affected    Daily Treatment Diary   Manuals            Lumbar rotation mob G3 HJ           R inom ant rot G3 HJ           R inom SCS ant rot 90" HJ                        Exercise Diary             Self MET R inom post rot 5"x3 -           TA cx  5"x20           Bridge  5"x20           Clamshell  5"x20  s/l           Tball squat             FSU             R kneel HF str  20"x5           Prone plank  Knees  10"x10           Prone swimmer             Bike w/u  7'                                       Assessment: Tolerated treatment well  Patient demonstrated fatigue post treatment and would benefit from continued PT to address deficits  Plan: Progress treatment as tolerated        Elina Thayer, PTA

## 2018-05-04 ENCOUNTER — OFFICE VISIT (OUTPATIENT)
Dept: PHYSICAL THERAPY | Facility: CLINIC | Age: 58
End: 2018-05-04
Payer: COMMERCIAL

## 2018-05-04 DIAGNOSIS — M25.551 RIGHT HIP PAIN: Primary | ICD-10-CM

## 2018-05-04 PROCEDURE — 97110 THERAPEUTIC EXERCISES: CPT

## 2018-05-04 NOTE — PROGRESS NOTES
Daily Note     Today's date: 2018  Patient name: Jonathan Lazo  : 1960  MRN: [id number]  Referring provider: Ursula Prescott DO  Dx:   Encounter Diagnosis     ICD-10-CM    1  Right hip pain M25 551                   Subjective: "Good," R hip pain 0/10  Objective: See treatment diary below    Precautions: benign stable brain tumor, balance affected    Daily Treatment Diary   Manuals           Lumbar rotation mob G3 HJ -          R inom ant rot G3 HJ -          R inom SCS ant rot 90" HJ -                       Exercise Diary             Self MET R inom post rot 5"x3 -           TA cx  5"x20 -          Bridge  5"x20 10"x30 U/L         Clamshell  5"x20  s/l 10"x30          Tball squat   2x10          FSU   4" 2x10 B          R kneel HF str  20"x5 x5          Prone plank  Knees  10"x10 x10          Prone swimmer   10          Bike w/u seat 4  7' 10'                                          Assessment: Tolerated treatment well  Patient exhibited good technique with therapeutic exercises  Pt currently painfree  HEP, therex progressed  Plan: Continue per plan of care

## 2018-05-14 NOTE — PROGRESS NOTES
Pt called today 5/14/18 to state she is feeling better  Pt agreed to being placed on hold x 2 wks, to reschedule PRN  If pt does not reschedule will be D/C to HEP  Pt has attended 3 visits, missed 0  Last tx 5/4/18

## 2018-05-15 ENCOUNTER — APPOINTMENT (OUTPATIENT)
Dept: PHYSICAL THERAPY | Facility: CLINIC | Age: 58
End: 2018-05-15
Payer: COMMERCIAL

## 2018-05-17 DIAGNOSIS — G43.909 MIGRAINE WITHOUT STATUS MIGRAINOSUS, NOT INTRACTABLE, UNSPECIFIED MIGRAINE TYPE: ICD-10-CM

## 2018-05-17 RX ORDER — RIZATRIPTAN BENZOATE 10 MG/1
10 TABLET, ORALLY DISINTEGRATING ORAL DAILY PRN
Qty: 90 TABLET | Refills: 0 | Status: SHIPPED | OUTPATIENT
Start: 2018-05-17 | End: 2019-04-11 | Stop reason: SDUPTHER

## 2018-05-18 ENCOUNTER — APPOINTMENT (OUTPATIENT)
Dept: PHYSICAL THERAPY | Facility: CLINIC | Age: 58
End: 2018-05-18
Payer: COMMERCIAL

## 2018-05-31 NOTE — PROGRESS NOTES
Pt has not attended PT since 5/4/18  See note below, pt called 5/14 to say she is feeling better and has not returned to PT   D/C to HEP  Pt attended 3 visits, missed 0

## 2018-07-30 ENCOUNTER — HOSPITAL ENCOUNTER (OUTPATIENT)
Dept: MRI IMAGING | Facility: HOSPITAL | Age: 58
Discharge: HOME | End: 2018-07-30
Attending: RADIOLOGY
Payer: COMMERCIAL

## 2018-07-30 DIAGNOSIS — D32.9 BENIGN MENINGIOMA (HCC): ICD-10-CM

## 2018-07-30 PROCEDURE — A9585 GADOBUTROL INJECTION: HCPCS | Performed by: RADIOLOGY

## 2018-07-30 PROCEDURE — 70553 MRI BRAIN STEM W/O & W/DYE: CPT

## 2018-07-30 RX ADMIN — GADOBUTROL 8 ML: 604.72 INJECTION INTRAVENOUS at 06:52

## 2018-08-01 ENCOUNTER — RADIATION ONCOLOGY FOLLOW-UP (OUTPATIENT)
Dept: RADIATION ONCOLOGY | Facility: HOSPITAL | Age: 58
End: 2018-08-01
Attending: RADIOLOGY

## 2018-08-01 VITALS
DIASTOLIC BLOOD PRESSURE: 70 MMHG | HEART RATE: 75 BPM | SYSTOLIC BLOOD PRESSURE: 120 MMHG | OXYGEN SATURATION: 97 % | WEIGHT: 201.6 LBS | RESPIRATION RATE: 16 BRPM | TEMPERATURE: 98.4 F | HEIGHT: 64 IN | BODY MASS INDEX: 34.42 KG/M2

## 2018-08-01 DIAGNOSIS — D32.9 BENIGN MENINGIOMA (HCC): Primary | ICD-10-CM

## 2018-08-01 NOTE — PROGRESS NOTES
Follow-up - Radiation Oncology   Estella Tabares 1960 66 y o  female [id number]      History of Present Illness   Cancer Staging  Benign meningioma [facility])  Staging form: Central Nervous System Tumors, Pediatric Staging  - Clinical: No stage assigned - Ross Wilson is a 66y o  year old female who had a right-sided CPA mass in May of 2014  This caused compression of her hypoglossal nerve and brainstem  She underwent suboccipital craniotomy and C1 laminectomy on 6/16/2014  The pathology was consistent with meningioma who grade 1  MRI of the brain from 5/16/2016 demonstrated enlarging meningioma at the level of the right side of the foramen magnum in comparison to her MRI from 05/2015  He was however smaller than on her preop MRI from 4/10/2014  Interval History:    Last seen six months ago  7/30/18 MRI Brain  IMPRESSION:  Slightly diminished size of right lateral foramen magnum meningioma status post right suboccipital craniotomy and radiation therapy     Persistent mild chronic microangiopathic ischemic white matter disease     No acute ischemic disease     Partially empty sella     Patient stated that when she is tired her walking gets "sloppy" and her speech gets slurred  However overall she states that she feels very good  Screening  Tobacco  Current tobacco user: no  If yes, brief counseling provided: NA     Hypertension  Hypertension screening performed: yes  Normotensive:  no  If no, referred to PCP: n/a     Depression Screening  Screened for depression using PHQ-2: yes     Screened for depression using PHQ-9:  no  Screening positive or negative:  negative  If score >4, was any of the following actions taken?    Additional evaluation for depression, suicide risk assesment, referral to PCP or psychiatry, medication started:  n/a     Advanced Care Planning for Patients >65 years  Advanced Care Planning Discussed:  n/a  Patient named surrogate decision maker or care plan in chart: n/a      Historical Information      [facility]    2014 Initial Diagnosis     Benign [facility] ([facility] )         6/16/2014 Surgery     suboccipital craniotomy and C1 laminectomy- pathology was consistent with meningioma who grade 1         7/6/2016 - 7/25/2016 Radiation     Plan ID Energy Fractions Dose per Fraction (cGy) Total Dose Delivered (cGy) Elapsed Days   SRT R [id number] 6X 3 / 3 450 1,350 5   SRT R Foramen 6X 2 / 2 500 1,000 2      Treatment Dates:  7/6/2016 - 7/25/2016              Past Medical History:   Diagnosis Date    Disease of thyroid gland     Hypertension     Migraine      Past Surgical History:   Procedure Laterality Date    CHOLECYSTECTOMY      CRANIOTOMY  06/16/2014    Suboccipital craniotomy and C1 laminectomy for 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     GALLBLADDER SURGERY  2001    Laparoscopic     [place] STEREOTACTIC RADIOSURGERY, CRANIAL,COMPLEX,SINGLE  7/6/2016         [place] STEREOTACTIC RADIOSURGERY, CRANIAL,COMPLEX,SINGLE  7/20/2016         TUBAL LIGATION  1992       Social History   History   Alcohol Use    Yes     Comment: Socially     History   Drug Use No     History   Smoking Status    Never Smoker   Smokeless Tobacco    Never Used         Meds/Allergies     Current Outpatient Prescriptions:     diclofenac sodium (VOLTAREN) 1 %, APPLY 2 GRAMS TOPICALLY 4 TIMES DAILY PRN, Disp: , Rfl: 1    hydrochlorothiazide (HYDRODIURIL) 50 mg tablet, Take 50 mg by mouth daily, Disp: , Rfl:     rizatriptan (MAXALT-MLT) 10 MG disintegrating tablet, Take 1 tablet (10 mg total) by mouth daily as needed for migraine, Disp: 90 tablet, Rfl: 0    SYNTHROID 50 MCG tablet, , Disp: , Rfl:     meloxicam (MOBIC) 15 mg tablet, Take 1 tablet (15 mg total) by mouth daily for 30 days, Disp: 30 tablet, Rfl: 0    Current Facility-Administered Medications:     diphenhydrAMINE (BENADRYL) injection 50 mg, 50 mg, Intramuscular, Q6H PRN, Shubham Franz PA-C, 50 mg at 02/02/18 1440  Allergies   Allergen Reactions    Augmentin [Amoxicillin-Pot Clavulanate] Hives and GI Intolerance     Reaction Date: 28Feb2012;          Review of Systems  Constitutional: Negative  HENT: Negative  Eyes: Positive for visual disturbance (Intermittent blurry vision)  Negative for pain  Respiratory: Negative  Cardiovascular: Negative  Gastrointestinal: Negative  Endocrine: Negative  Genitourinary: Negative  Musculoskeletal: Negative  Skin: Negative  Allergic/Immunologic: Negative  Neurological: Positive for numbness (Tinglind in right left leg from calf down  ) and headaches (Patient states "nothing out of the norm")  Negative for dizziness and light-headedness  Hematological: Negative  Psychiatric/Behavioral: Negative               OBJECTIVE:   /70 (BP Location: Left arm, Patient Position: Sitting, Cuff Size: Standard)   Pulse 75   Temp 98 4 °F (36 9 °C) (Oral)   Resp 16   Ht 5' 3 75" (1 619 m)   Wt 91 4 kg (201 lb 9 6 oz)   SpO2 97%   BMI 34 88 kg/m²   Pain Assessment:  0  ECOG/Zubrod/WHO: 0 - Asymptomatic    Physical Exam   Constitutional: She appears well-developed  HENT:   Head: Normocephalic  Hair is normal    Mouth/Throat: Oropharynx is clear and moist    Eyes: EOM are normal  No scleral icterus  Neck: Neck supple  No spinous process tenderness present  No edema present  The skin in the radiated field is in good condition  No significant hair loss   Cardiovascular: Normal rate and regular rhythm  Pulmonary/Chest: Effort normal and breath sounds normal  No respiratory distress  She has no wheezes  She exhibits no tenderness  Abdominal: Soft  Bowel sounds are normal  She exhibits no distension, no ascites and no mass  There is no hepatosplenomegaly  There is no tenderness  There is no rebound  Genitourinary: No breast swelling or tenderness  Musculoskeletal: Normal range of motion  She exhibits no edema or tenderness  Lymphadenopathy:     She has no cervical adenopathy  She has no axillary adenopathy  Neurological: She is alert  She has normal strength  No cranial nerve deficit  Coordination and gait normal    Her muscle strength symmetric bilaterally  She is ambulating independently without any signs of imbalance   Skin: Skin is intact  Psychiatric: She has a normal mood and affect  Her speech is normal and behavior is normal            RESULTS    Lab Results: No results found for this or any previous visit (from the past 672 hour(s))  Imaging Studies:Mri Brain W Wo Contrast    Result Date: 7/30/2018  Narrative: MRI BRAIN WITH AND WITHOUT CONTRAST INDICATION: 51-year-old female, meningioma, follow-up therapy COMPARISON:  1/26/2018 MRI TECHNIQUE: Sagittal T1, axial T2, axial FLAIR, axial T1, axial [place], axial diffusion  Sagittal, axial and coronal T1 postcontrast   Axial BRAVO post contrast   IV Contrast:  8 mL of gadobutrol injection (MULTI-DOSE)  IMAGE QUALITY:   Diagnostic  FINDINGS: BRAIN PARENCHYMA:  Mild chronic microangiopathic ischemic changes involve supratentorial white matter, similar to previous  No acute ischemic disease  The somewhat lenticular shaped enhancing mass consistent with meningioma measuring approximately 6 mm thick by 25 mm anteroposterior is present at the right lateral aspect of the foramen magnum, slightly diminished compared with the prior study where it measured approximately 7 mm thick by 28 mm anteroposterior  As before, the lesion remains contiguous with the right lateral aspect of the brainstem at the cervical medullary junction and right cerebellar tonsil  Brainstem and cerebellum demonstrate normal signal  There is no intracranial hemorrhage  There is no evidence of acute infarction and diffusion imaging is unremarkable    VENTRICLES:  Normal  SELLA AND PITUITARY GLAND:  Partially empty sella, unchanged ORBITS:  Normal  PARANASAL SINUSES:  Normal  VASCULATURE:  Small right vertebral artery is displaced or encased by the meningioma, unchanged CALVARIUM AND SKULL BASE:  Persistent right suboccipital craniotomy  Paucity of marrow fat in the right lateral clivus consistent with radiation therapy EXTRACRANIAL SOFT TISSUES:  Normal      Impression: Slightly diminished size of right lateral foramen magnum meningioma status post right suboccipital craniotomy and radiation therapy Persistent mild chronic microangiopathic ischemic white matter disease No acute ischemic disease Partially empty sella Workstation performed: [id number]           Assessment/Plan:  Orders Placed This Encounter   Procedures    MRI brain w wo contrast    BUN    Creatinine, serum        Gasper Macias is a 66y o  year old female with who grade 1 meningioma of the cervicomedullary junction  She 2 years post SRT  I reviewed with her most recent MRI of the brain from 7/30/2018 which shows slightly diminished size of the right lateral foramen magnum meningioma  Overall she feels well  I have ordered follow-up MRI scan in 6 months and she will return for follow-up visit thereafter  Les Vanessa MD  8/1/2018,11:30 AM    Portions of the record may have been created with voice recognition software   Occasional wrong word or "sound a like" substitutions may have occurred due to the inherent limitations of voice recognition software   Read the chart carefully and recognize, using context, where substitutions have occurred

## 2018-08-01 NOTE — PROGRESS NOTES
Sudhir Ackerman  1960   Ms Leeann Garrett is a 66 y o  female       Chief Complaint   Patient presents with    Follow-up     meningioma        Cancer Staging  Benign meningioma ([facility] [address] )  Staging form: Central Nervous System Tumors, Pediatric Staging  - Clinical: No stage assigned - Unsigned      Oncology History    Patient presents today for SRT follow up  Treatment completed on 7/25/16  Last seen on 1/31/18 7/30/18- MRI scheduled        Benign meningioma ([facility] [address] )    2014 Initial Diagnosis     Benign meningioma ([facility] [address] )         6/16/2014 Surgery     suboccipital craniotomy and C1 laminectomy- pathology was consistent with meningioma who grade 1         7/6/2016 - 7/25/2016 Radiation     Plan ID Energy Fractions Dose per Fraction (cGy) Total Dose Delivered (cGy) Elapsed Days   SRT R [id number] 6X 3 / 3 450 1,350 5   SRT R Foramen 6X 2 / 2 500 1,000 2      Treatment Dates:  7/6/2016 - 7/25/2016          Clinical Trial: no    Interval History  Last seen six months ago  7/30/18 MRI Brain  IMPRESSION:  Slightly diminished size of right lateral foramen magnum meningioma status post right suboccipital craniotomy and radiation therapy     Persistent mild chronic microangiopathic ischemic white matter disease     No acute ischemic disease     Partially empty sella    Patient stated that when she is tired her walking gets "sloppy" and her speech gets slurred  Screening  Tobacco  Current tobacco user: no  If yes, brief counseling provided: NA    Hypertension  Hypertension screening performed: yes  Normotensive:  no  If no, referred to PCP: n/a    Depression Screening  Screened for depression using PHQ-2: yes    Screened for depression using PHQ-9:  no  Screening positive or negative:  negative  If score >4, was any of the following actions taken?    Additional evaluation for depression, suicide risk assesment, referral to PCP or psychiatry, medication started:  [id number] [facility] >65 [facility]  Advanced Care Planning Discussed:  n/a  Patient named surrogate decision maker or care plan in chart: n/a    @[email or web address]@  Health Maintenance   Topic Date Due    HIV SCREENING  1960    Hepatitis C Screening  1960    CRC Screening: Colonoscopy  1960    DTaP,Tdap,and Td Vaccines (1 - Tdap) 07/09/1983    MAMMOGRAM  05/08/2018    INFLUENZA VACCINE  09/01/2018    Depression Screening PHQ-9  04/26/2019       Patient Active Problem List   Diagnosis    Benign meningioma ([facility] [address] )    Hypertension    Hypothyroidism    Thickened endometrium     Past Medical History:   Diagnosis Date    Disease of thyroid gland     Hypertension     Migraine      Past Surgical History:   Procedure Laterality Date    CHOLECYSTECTOMY      CRANIOTOMY  06/16/2014    Suboccipital craniotomy and C1 laminectomy for 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     GALLBLADDER SURGERY  2001    Laparoscopic     ID STEREOTACTIC RADIOSURGERY, CRANIAL,COMPLEX,SINGLE  7/6/2016         ID STEREOTACTIC RADIOSURGERY, CRANIAL,COMPLEX,SINGLE  7/20/2016         TUBAL LIGATION  1992     Family History   Problem Relation Age of Onset    Breast cancer Mother     Diabetes Family     Heart attack Family     Multiple sclerosis Family      Social History     Social History    Marital status: /Civil Union     Spouse name: N/A    Number of children: N/A    Years of education: N/A     Occupational History           Social History Main Topics    Smoking status: Never Smoker    Smokeless tobacco: Never Used    Alcohol use Yes      Comment: Socially    Drug use: No    Sexual activity: Yes     Other Topics Concern    Not on file     Social History Narrative    No narrative on file       Current Outpatient Prescriptions:     diclofenac sodium (VOLTAREN) 1 %, APPLY 2 GRAMS TOPICALLY 4 TIMES DAILY PRN, Disp: , Rfl: 1    hydrochlorothiazide (HYDRODIURIL) 50 mg tablet, Take 50 mg by mouth daily, Disp: , Rfl:     rizatriptan (MAXALT-MLT) 10 MG disintegrating tablet, Take 1 tablet (10 mg total) by mouth daily as needed for migraine, Disp: 90 tablet, Rfl: 0    SYNTHROID 50 MCG tablet, , Disp: , Rfl:     meloxicam (MOBIC) 15 mg tablet, Take 1 tablet (15 mg total) by mouth daily for 30 days, Disp: 30 tablet, Rfl: 0    Current Facility-Administered Medications:     diphenhydrAMINE (BENADRYL) injection 50 mg, 50 mg, Intramuscular, Q6H PRN, Richelle Noguera PA-C, 50 mg at 02/02/18 1440  Allergies   Allergen Reactions    Augmentin [Amoxicillin-Pot Clavulanate] Hives and GI Intolerance     Reaction Date: 28Feb2012;        Review of Systems:  Review of Systems   Constitutional: Negative  HENT: Negative  Eyes: Positive for visual disturbance (Intermittent blurry vision)  Negative for pain  Respiratory: Negative  Cardiovascular: Negative  Gastrointestinal: Negative  Endocrine: Negative  Genitourinary: Negative  Musculoskeletal: Negative  Skin: Negative  Allergic/Immunologic: Negative  Neurological: Positive for numbness ([place] in right left leg from calf down  ) and headaches (Patient states "nothing out of the norm")  Negative for dizziness and light-headedness  Hematological: Negative  Psychiatric/Behavioral: Negative  Vitals:    08/01/18 1050   BP: 120/70   BP Location: Left arm   Patient Position: Sitting   Cuff Size: Standard   Pulse: 75   Resp: 16   Temp: 98 4 °F (36 9 °C)   TempSrc: Oral   SpO2: 97%   Weight: 91 4 kg (201 lb 9 6 oz)   Height: 5' 3 75" (1 619 m)       Pain Score: 0-No pain    Imaging:Mri Brain W Wo Contrast    Result Date: 7/30/2018  Narrative: MRI BRAIN WITH AND WITHOUT CONTRAST INDICATION: 17-year-old female, meningioma, follow-up therapy COMPARISON:  1/26/2018 MRI TECHNIQUE: Sagittal T1, axial T2, axial FLAIR, axial T1, axial [place], axial diffusion   Sagittal, axial and coronal T1 postcontrast   Axial BRAVO post contrast   IV Contrast:  8 mL of gadobutrol injection (MULTI-DOSE)  IMAGE QUALITY:   Diagnostic  FINDINGS: BRAIN PARENCHYMA:  Mild chronic microangiopathic ischemic changes involve supratentorial white matter, similar to previous  No acute ischemic disease  The somewhat lenticular shaped enhancing mass consistent with meningioma measuring approximately 6 mm thick by 25 mm anteroposterior is present at the right lateral aspect of the foramen magnum, slightly diminished compared with the prior study where it measured approximately 7 mm thick by 28 mm anteroposterior  As before, the lesion remains contiguous with the right lateral aspect of the brainstem at the cervical medullary junction and right cerebellar tonsil  Brainstem and cerebellum demonstrate normal signal  There is no intracranial hemorrhage  There is no evidence of acute infarction and diffusion imaging is unremarkable  VENTRICLES:  Normal  SELLA AND PITUITARY GLAND:  Partially empty sella, unchanged ORBITS:  Normal  PARANASAL SINUSES:  Normal  VASCULATURE:  Small right vertebral artery is displaced or encased by the meningioma, unchanged CALVARIUM AND SKULL BASE:  Persistent right suboccipital craniotomy   Paucity of marrow fat in the right lateral clivus consistent with radiation therapy EXTRACRANIAL SOFT TISSUES:  Normal      Impression: Slightly diminished size of right lateral foramen magnum meningioma status post right suboccipital craniotomy and radiation therapy Persistent mild chronic microangiopathic ischemic white matter disease No acute ischemic disease Partially empty sella Workstation performed: [id number]

## 2018-08-02 ENCOUNTER — OFFICE VISIT (OUTPATIENT)
Dept: FAMILY MEDICINE CLINIC | Facility: CLINIC | Age: 58
End: 2018-08-02
Payer: COMMERCIAL

## 2018-08-02 VITALS
TEMPERATURE: 98.1 F | WEIGHT: 203 LBS | SYSTOLIC BLOOD PRESSURE: 148 MMHG | DIASTOLIC BLOOD PRESSURE: 98 MMHG | OXYGEN SATURATION: 98 % | BODY MASS INDEX: 34.66 KG/M2 | HEART RATE: 68 BPM | HEIGHT: 64 IN

## 2018-08-02 DIAGNOSIS — R39.9 UTI SYMPTOMS: Primary | ICD-10-CM

## 2018-08-02 LAB
SL AMB  POCT GLUCOSE, UA: NORMAL
SL AMB LEUKOCYTE ESTERASE,UA: ABNORMAL
SL AMB POCT BILIRUBIN,UA: NEGATIVE
SL AMB POCT BLOOD,UA: ABNORMAL
SL AMB POCT CLARITY,UA: CLEAR
SL AMB POCT COLOR,UA: YELLOW
SL AMB POCT KETONES,UA: NEGATIVE
SL AMB POCT NITRITE,UA: ABNORMAL
SL AMB POCT PH,UA: 7
SL AMB POCT SPECIFIC GRAVITY,UA: 1.01
SL AMB POCT URINE PROTEIN: NEGATIVE
SL AMB POCT UROBILINOGEN: NORMAL

## 2018-08-02 PROCEDURE — 99213 OFFICE O/P EST LOW 20 MIN: CPT | Performed by: FAMILY MEDICINE

## 2018-08-02 PROCEDURE — 81002 URINALYSIS NONAUTO W/O SCOPE: CPT | Performed by: FAMILY MEDICINE

## 2018-08-02 RX ORDER — CIPROFLOXACIN 500 MG/1
500 TABLET, FILM COATED ORAL EVERY 12 HOURS SCHEDULED
Qty: 10 TABLET | Refills: 0 | Status: SHIPPED | OUTPATIENT
Start: 2018-08-02 | End: 2018-08-07

## 2018-08-02 NOTE — PROGRESS NOTES
Assessment/Plan:  Side effect profile medication reviewed  Recommend return to office if no improvement or worsening symptoms  Await culture results  She will call if any new symptoms develop  No problem-specific Assessment & Plan notes found for this encounter  Diagnoses and all orders for this visit:    UTI symptoms  -     POCT urine dip  -     ciprofloxacin (CIPRO) 500 mg tablet; Take 1 tablet (500 mg total) by mouth every 12 (twelve) hours for 5 days    Other orders  -     diclofenac sodium (VOLTAREN) 1 %; APPLY 2 GRAMS TOPICALLY 4 TIMES DAILY PRN          Subjective:      Patient ID: Siri Sexton is a 66 y o  female  Patient here with UTI symptoms over the last 2-3 days  Symptoms are mild-to-moderate  She notes some burning with urination  No back pain or fevers  Denies any rashes or arthralgias  Denies any abdominal pain  The following portions of the patient's history were reviewed and updated as appropriate: allergies, current medications, past family history, past medical history, past social history, past surgical history and problem list     Review of Systems   Constitutional: Negative  HENT: Negative  Eyes: Negative  Respiratory: Negative  Cardiovascular: Negative  Gastrointestinal: Negative  Endocrine: Negative  Genitourinary: Positive for dysuria  Musculoskeletal: Negative  Skin: Negative  Allergic/Immunologic: Negative  Neurological: Negative  Hematological: Negative  Psychiatric/Behavioral: Negative  Objective:      /98 (BP Location: Left arm, Patient Position: Sitting, Cuff Size: Large)   Pulse 68   Temp 98 1 °F (36 7 °C) (Tympanic)   Ht 5' 3 68" (1 617 m)   Wt 92 1 kg (203 lb)   SpO2 98%   BMI 35 19 kg/m²          Physical Exam   Constitutional: She is oriented to person, place, and time  She appears well-developed and well-nourished  HENT:   Head: Normocephalic and atraumatic     Right Ear: External ear normal  Tympanic membrane is not erythematous and not bulging  Left Ear: External ear normal  Tympanic membrane is not erythematous and not bulging  Nose: Nose normal    Mouth/Throat: Oropharynx is clear and moist and mucous membranes are normal  No oral lesions  No oropharyngeal exudate  Eyes: Conjunctivae and EOM are normal  Right eye exhibits no discharge  Left eye exhibits no discharge  No scleral icterus  Neck: Normal range of motion  Neck supple  No thyromegaly present  Cardiovascular: Normal rate, regular rhythm and normal heart sounds  Exam reveals no gallop and no friction rub  No murmur heard  Pulmonary/Chest: Effort normal  No respiratory distress  She has no wheezes  She has no rales  She exhibits no tenderness  Abdominal: Soft  Bowel sounds are normal  She exhibits no distension and no mass  There is no tenderness  There is no rebound and no guarding  Musculoskeletal: Normal range of motion  She exhibits no edema, tenderness or deformity  Lymphadenopathy:     She has no cervical adenopathy  Neurological: She is alert and oriented to person, place, and time  She has normal reflexes  No cranial nerve deficit  She exhibits normal muscle tone  Coordination normal    Skin: Skin is warm and dry  No rash noted  No erythema  No pallor  Psychiatric: She has a normal mood and affect  Her behavior is normal    Vitals reviewed

## 2018-09-05 ENCOUNTER — TELEPHONE (OUTPATIENT)
Dept: FAMILY MEDICINE CLINIC | Facility: CLINIC | Age: 58
End: 2018-09-05
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
## 2018-09-07 ENCOUNTER — OFFICE VISIT (OUTPATIENT)
Dept: FAMILY MEDICINE CLINIC | Facility: CLINIC | Age: 58
End: 2018-09-07
Payer: COMMERCIAL

## 2018-09-07 ENCOUNTER — TELEPHONE (OUTPATIENT)
Dept: NEUROSURGERY | Facility: CLINIC | Age: 58
End: 2018-09-07

## 2018-09-07 VITALS
WEIGHT: 201 LBS | HEART RATE: 87 BPM | BODY MASS INDEX: 35.61 KG/M2 | DIASTOLIC BLOOD PRESSURE: 86 MMHG | OXYGEN SATURATION: 98 % | TEMPERATURE: 98.6 F | SYSTOLIC BLOOD PRESSURE: 124 MMHG | HEIGHT: 63 IN

## 2018-09-07 DIAGNOSIS — D32.9 BENIGN MENINGIOMA (HCC): Primary | ICD-10-CM

## 2018-09-07 DIAGNOSIS — Z12.39 SCREENING FOR BREAST CANCER: ICD-10-CM

## 2018-09-07 PROBLEM — S10.93XA HEMATOMA OF NECK: Status: ACTIVE | Noted: 2018-08-14

## 2018-09-07 PROBLEM — S20.01XA POSTTRAUMATIC HEMATOMA OF RIGHT BREAST: Status: ACTIVE | Noted: 2018-08-14

## 2018-09-07 PROBLEM — S22.21XA CLOSED FRACTURE OF MANUBRIUM: Status: ACTIVE | Noted: 2018-08-14

## 2018-09-07 PROCEDURE — 99213 OFFICE O/P EST LOW 20 MIN: CPT | Performed by: FAMILY MEDICINE

## 2018-09-07 RX ORDER — ACETAMINOPHEN 500 MG
500 TABLET ORAL EVERY 6 HOURS
COMMUNITY
Start: 2018-08-16 | End: 2021-11-12 | Stop reason: ALTCHOICE

## 2018-09-07 RX ORDER — CYCLOBENZAPRINE HCL 5 MG
5-10 TABLET ORAL 3 TIMES DAILY PRN
COMMUNITY
Start: 2018-08-28 | End: 2019-03-29

## 2018-09-07 RX ORDER — LOSARTAN POTASSIUM 100 MG/1
100 TABLET ORAL
COMMUNITY
End: 2019-02-06 | Stop reason: ALTCHOICE

## 2018-09-07 RX ORDER — METHOCARBAMOL 500 MG/1
500 TABLET, FILM COATED ORAL EVERY 4 HOURS
Refills: 0 | COMMUNITY
Start: 2018-08-16 | End: 2018-09-07

## 2018-09-07 RX ORDER — LEVOTHYROXINE SODIUM 0.1 MG/1
100 TABLET ORAL
COMMUNITY
End: 2018-09-07
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
## 2018-09-11 ENCOUNTER — DOCUMENTATION (OUTPATIENT)
Dept: NEUROSURGERY | Facility: CLINIC | Age: 58
End: 2018-09-11

## 2018-09-11 NOTE — PROGRESS NOTES
09/11/2018-PT CAME INTO [place] OFFICE IN REGARDS TO 09/07/2018 ARMAND'S TELEPHONE NOTE  DISCUSSED WITH ARMAND, PT IS SCHEDULED W/RENUKA ON HDM DAY ON 10/05/2018   DISC AND RECORDS GIVEN TO Tiesha Davis

## 2018-10-01 DIAGNOSIS — Z12.39 BREAST CANCER SCREENING: Primary | ICD-10-CM

## 2018-10-02 ENCOUNTER — OFFICE VISIT (OUTPATIENT)
Dept: FAMILY MEDICINE CLINIC | Facility: CLINIC | Age: 58
End: 2018-10-02
Payer: COMMERCIAL

## 2018-10-02 VITALS
DIASTOLIC BLOOD PRESSURE: 100 MMHG | HEART RATE: 78 BPM | HEIGHT: 63 IN | BODY MASS INDEX: 36.14 KG/M2 | WEIGHT: 204 LBS | OXYGEN SATURATION: 98 % | SYSTOLIC BLOOD PRESSURE: 140 MMHG | TEMPERATURE: 98.3 F

## 2018-10-02 DIAGNOSIS — F07.81 POST CONCUSSION SYNDROME: Primary | ICD-10-CM

## 2018-10-02 PROCEDURE — 99214 OFFICE O/P EST MOD 30 MIN: CPT | Performed by: FAMILY MEDICINE

## 2018-10-02 NOTE — LETTER
October 2, 2018     Patient: Marion Landry   YOB: 1960   Date of Visit: 10/2/2018       To Whom it May Concern:    Flavia Lili is under my professional care  She was seen in my office on 10/2/2018  She is under our care for treatment of a concussion and is due to see a concussion specialist   We are recommending that she only work 4-6 hour shifts as tolerated until evaluated by concussion specialist and cleared to return to full time work  If you have any questions or concerns, please don't hesitate to call           Sincerely,          Don Pederson DO        CC: No Recipients
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
## 2018-10-02 NOTE — LETTER
October 2, 2018     Patient: Maria Luz Lopez   YOB: 1960   Date of Visit: 10/2/2018       To Whom it May Concern:    Silvana Valencia is under my professional care  She was seen in my office on 10/2/2018  She does not have a prior history of breast cancer  Please have the diagnosis code of Z12 [id number] removed from your list of current and past diagnoses for this patient           Sincerely,          Randy Moran DO        CC: No Recipients

## 2018-10-05 ENCOUNTER — OFFICE VISIT (OUTPATIENT)
Dept: NEUROSURGERY | Facility: CLINIC | Age: 58
End: 2018-10-05
Payer: COMMERCIAL

## 2018-10-05 VITALS
SYSTOLIC BLOOD PRESSURE: 130 MMHG | HEART RATE: 70 BPM | DIASTOLIC BLOOD PRESSURE: 90 MMHG | BODY MASS INDEX: 36.14 KG/M2 | TEMPERATURE: 98.1 F | HEIGHT: 63 IN

## 2018-10-05 DIAGNOSIS — Z98.890 POSTOPERATIVE STATE: ICD-10-CM

## 2018-10-05 DIAGNOSIS — D32.9 MENINGIOMA (HCC): Primary | ICD-10-CM

## 2018-10-05 PROCEDURE — 99213 OFFICE O/P EST LOW 20 MIN: CPT | Performed by: PHYSICIAN ASSISTANT

## 2018-10-05 RX ORDER — NAPROXEN 250 MG/1
250 TABLET ORAL EVERY 12 HOURS
COMMUNITY
End: 2019-02-06 | Stop reason: ALTCHOICE

## 2018-10-05 NOTE — PATIENT INSTRUCTIONS
Continue follow up with your concussion specialist     Also continue established follow up with Dr Renetta Winters  Contact our office or present to Emergency Room if experience new / worsening headache, or experience seizure, mental status change, sensory / motor change, or other neurological change
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
## 2018-11-20 DIAGNOSIS — I10 ESSENTIAL HYPERTENSION: Primary | ICD-10-CM

## 2018-11-20 RX ORDER — LOSARTAN POTASSIUM AND HYDROCHLOROTHIAZIDE 12.5; 5 MG/1; MG/1
TABLET ORAL
Qty: 90 TABLET | Refills: 3 | Status: SHIPPED | OUTPATIENT
Start: 2018-11-20 | End: 2020-02-19 | Stop reason: ALTCHOICE

## 2018-11-23 DIAGNOSIS — E03.9 HYPOTHYROIDISM, UNSPECIFIED TYPE: Primary | ICD-10-CM

## 2018-11-23 RX ORDER — LEVOTHYROXINE SODIUM 0.05 MG/1
TABLET ORAL
Qty: 90 TABLET | Refills: 3 | Status: SHIPPED | OUTPATIENT
Start: 2018-11-23 | End: 2019-11-18 | Stop reason: SDUPTHER

## 2019-01-22 ENCOUNTER — TELEPHONE (OUTPATIENT)
Dept: FAMILY MEDICINE CLINIC | Facility: CLINIC | Age: 59
End: 2019-01-22

## 2019-01-22 DIAGNOSIS — Z12.11 COLON CANCER SCREENING: Primary | ICD-10-CM

## 2019-01-22 NOTE — TELEPHONE ENCOUNTER
Patient called back and decided to go forward with a colonoscopy  Please put in a referral for gastro, I advised pt that when order is system she can call any [facility] gastroenterologist and schedule an appointment to get scheduled for a colonoscopy

## 2019-02-01 ENCOUNTER — HOSPITAL ENCOUNTER (OUTPATIENT)
Dept: MRI IMAGING | Facility: HOSPITAL | Age: 59
Discharge: HOME | End: 2019-02-01
Attending: RADIOLOGY
Payer: COMMERCIAL

## 2019-02-01 DIAGNOSIS — D32.9 BENIGN MENINGIOMA (HCC): ICD-10-CM

## 2019-02-01 PROCEDURE — A9585 GADOBUTROL INJECTION: HCPCS | Performed by: RADIOLOGY

## 2019-02-01 PROCEDURE — 70553 MRI BRAIN STEM W/O & W/DYE: CPT

## 2019-02-01 RX ADMIN — GADOBUTROL 9 ML: 604.72 INJECTION INTRAVENOUS at 09:31

## 2019-02-06 ENCOUNTER — RADIATION ONCOLOGY FOLLOW-UP (OUTPATIENT)
Dept: RADIATION ONCOLOGY | Facility: HOSPITAL | Age: 59
End: 2019-02-06
Attending: RADIOLOGY

## 2019-02-06 VITALS
DIASTOLIC BLOOD PRESSURE: 70 MMHG | HEIGHT: 63 IN | SYSTOLIC BLOOD PRESSURE: 128 MMHG | TEMPERATURE: 98.2 F | RESPIRATION RATE: 16 BRPM | WEIGHT: 202.4 LBS | OXYGEN SATURATION: 98 % | HEART RATE: 64 BPM | BODY MASS INDEX: 35.86 KG/M2

## 2019-02-06 DIAGNOSIS — D32.9 BENIGN MENINGIOMA (HCC): Primary | ICD-10-CM

## 2019-02-06 RX ORDER — ACETAMINOPHEN 160 MG
2000 TABLET,DISINTEGRATING ORAL DAILY
COMMUNITY
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
## 2019-02-20 ENCOUNTER — OFFICE VISIT (OUTPATIENT)
Dept: FAMILY MEDICINE CLINIC | Facility: CLINIC | Age: 59
End: 2019-02-20
Payer: COMMERCIAL

## 2019-02-20 VITALS
HEIGHT: 64 IN | BODY MASS INDEX: 35.17 KG/M2 | WEIGHT: 206 LBS | TEMPERATURE: 97.8 F | RESPIRATION RATE: 16 BRPM | DIASTOLIC BLOOD PRESSURE: 88 MMHG | HEART RATE: 84 BPM | SYSTOLIC BLOOD PRESSURE: 130 MMHG

## 2019-02-20 DIAGNOSIS — S29.011A PECTORALIS MUSCLE STRAIN, INITIAL ENCOUNTER: Primary | ICD-10-CM

## 2019-02-20 PROCEDURE — 3008F BODY MASS INDEX DOCD: CPT | Performed by: FAMILY MEDICINE

## 2019-02-20 PROCEDURE — 99213 OFFICE O/P EST LOW 20 MIN: CPT | Performed by: FAMILY MEDICINE
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
## 2019-03-13 ENCOUNTER — ANNUAL EXAM (OUTPATIENT)
Dept: OBGYN CLINIC | Facility: MEDICAL CENTER | Age: 59
End: 2019-03-13
Payer: COMMERCIAL

## 2019-03-13 VITALS
SYSTOLIC BLOOD PRESSURE: 110 MMHG | DIASTOLIC BLOOD PRESSURE: 66 MMHG | BODY MASS INDEX: 36.14 KG/M2 | HEIGHT: 63 IN | WEIGHT: 204 LBS

## 2019-03-13 DIAGNOSIS — S20.01XA POSTTRAUMATIC HEMATOMA OF RIGHT BREAST, INITIAL ENCOUNTER: ICD-10-CM

## 2019-03-13 DIAGNOSIS — Z01.419 ENCNTR FOR GYN EXAM (GENERAL) (ROUTINE) W/O ABN FINDINGS: Primary | ICD-10-CM

## 2019-03-13 DIAGNOSIS — Z12.31 ENCOUNTER FOR SCREENING MAMMOGRAM FOR MALIGNANT NEOPLASM OF BREAST: ICD-10-CM

## 2019-03-13 DIAGNOSIS — N63.10 LUMP OF RIGHT BREAST: ICD-10-CM

## 2019-03-13 PROCEDURE — S0612 ANNUAL GYNECOLOGICAL EXAMINA: HCPCS | Performed by: NURSE PRACTITIONER
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
## 2019-03-18 ENCOUNTER — HOSPITAL ENCOUNTER (OUTPATIENT)
Dept: MAMMOGRAPHY | Facility: CLINIC | Age: 59
Discharge: HOME | End: 2019-03-18
Payer: COMMERCIAL

## 2019-03-18 ENCOUNTER — TRANSCRIBE ORDERS (OUTPATIENT)
Dept: ADMINISTRATIVE | Facility: HOSPITAL | Age: 59
End: 2019-03-18

## 2019-03-18 ENCOUNTER — HOSPITAL ENCOUNTER (OUTPATIENT)
Dept: ULTRASOUND IMAGING | Facility: CLINIC | Age: 59
Discharge: HOME | End: 2019-03-18
Payer: COMMERCIAL

## 2019-03-18 DIAGNOSIS — N63.10 LUMP OF RIGHT BREAST: ICD-10-CM

## 2019-03-18 DIAGNOSIS — N63.15 BREAST LUMP ON RIGHT SIDE AT 3 O'CLOCK POSITION: Primary | ICD-10-CM

## 2019-03-18 PROCEDURE — 76642 ULTRASOUND BREAST LIMITED: CPT

## 2019-03-18 PROCEDURE — 77066 DX MAMMO INCL CAD BI: CPT

## 2019-03-18 PROCEDURE — G0279 TOMOSYNTHESIS, MAMMO: HCPCS

## 2019-03-20 ENCOUNTER — OFFICE VISIT (OUTPATIENT)
Dept: URGENT CARE | Facility: CLINIC | Age: 59
End: 2019-03-20
Payer: COMMERCIAL

## 2019-03-20 VITALS
HEIGHT: 63 IN | DIASTOLIC BLOOD PRESSURE: 80 MMHG | OXYGEN SATURATION: 99 % | SYSTOLIC BLOOD PRESSURE: 150 MMHG | TEMPERATURE: 97.2 F | RESPIRATION RATE: 20 BRPM | WEIGHT: 200 LBS | BODY MASS INDEX: 35.44 KG/M2 | HEART RATE: 75 BPM

## 2019-03-20 DIAGNOSIS — R51.9 ACUTE NONINTRACTABLE HEADACHE, UNSPECIFIED HEADACHE TYPE: Primary | ICD-10-CM

## 2019-03-20 PROCEDURE — 99214 OFFICE O/P EST MOD 30 MIN: CPT | Performed by: FAMILY MEDICINE

## 2019-03-20 RX ORDER — ONDANSETRON 4 MG/1
4 TABLET, ORALLY DISINTEGRATING ORAL ONCE
Status: COMPLETED | OUTPATIENT
Start: 2019-03-20 | End: 2019-03-20

## 2019-03-20 RX ORDER — KETOROLAC TROMETHAMINE 30 MG/ML
30 INJECTION, SOLUTION INTRAMUSCULAR; INTRAVENOUS ONCE
Status: COMPLETED | OUTPATIENT
Start: 2019-03-20 | End: 2019-03-20

## 2019-03-20 RX ADMIN — ONDANSETRON 4 MG: 4 TABLET, ORALLY DISINTEGRATING ORAL at 12:35

## 2019-03-20 RX ADMIN — KETOROLAC TROMETHAMINE 30 MG: 30 INJECTION, SOLUTION INTRAMUSCULAR; INTRAVENOUS at 12:50

## 2019-03-20 RX ADMIN — KETOROLAC TROMETHAMINE 30 MG: 30 INJECTION, SOLUTION INTRAMUSCULAR; INTRAVENOUS at 12:35
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
## 2019-03-27 ENCOUNTER — TELEPHONE (OUTPATIENT)
Dept: OTHER | Facility: OTHER | Age: 59
End: 2019-03-27

## 2019-03-27 ENCOUNTER — HOSPITAL ENCOUNTER (OUTPATIENT)
Dept: CT IMAGING | Facility: HOSPITAL | Age: 59
Discharge: HOME | End: 2019-03-27
Payer: COMMERCIAL

## 2019-03-27 ENCOUNTER — TELEPHONE (OUTPATIENT)
Dept: FAMILY MEDICINE CLINIC | Facility: CLINIC | Age: 59
End: 2019-03-27

## 2019-03-27 ENCOUNTER — OFFICE VISIT (OUTPATIENT)
Dept: FAMILY MEDICINE CLINIC | Facility: CLINIC | Age: 59
End: 2019-03-27
Payer: COMMERCIAL

## 2019-03-27 VITALS
SYSTOLIC BLOOD PRESSURE: 136 MMHG | BODY MASS INDEX: 35.51 KG/M2 | RESPIRATION RATE: 20 BRPM | HEIGHT: 64 IN | TEMPERATURE: 98.2 F | HEART RATE: 76 BPM | WEIGHT: 208 LBS | DIASTOLIC BLOOD PRESSURE: 92 MMHG

## 2019-03-27 DIAGNOSIS — R10.11 RUQ ABDOMINAL PAIN: ICD-10-CM

## 2019-03-27 DIAGNOSIS — R10.11 RUQ ABDOMINAL PAIN: Primary | ICD-10-CM

## 2019-03-27 PROCEDURE — 74150 CT ABDOMEN W/O CONTRAST: CPT

## 2019-03-27 PROCEDURE — 99214 OFFICE O/P EST MOD 30 MIN: CPT | Performed by: FAMILY MEDICINE

## 2019-03-27 PROCEDURE — 3008F BODY MASS INDEX DOCD: CPT | Performed by: FAMILY MEDICINE

## 2019-03-27 NOTE — PROGRESS NOTES
Assessment/Plan:  Patient has had cholecystectomy already but has right upper quadrant pain as noted  She is afebrile  Considering duration of symptoms and location of pain recommended CT scan without contrast and stat blood testing  ER evaluation if symptoms worsen  Await results  She will call if any new or worsening symptoms in the interim including fever  No problem-specific Assessment & Plan notes found for this encounter  Diagnoses and all orders for this visit:    RUQ abdominal pain  -     CT abdomen wo contrast; Future  -     CBC and differential  -     Comprehensive metabolic panel          Subjective:      Patient ID: Dora Cuba is a 66 y o  female  Patient with right upper quadrant abdominal pain  Onset 3 days ago  Symptoms are mild-to-moderate and localized to the right upper quadrant  She has had a cholecystectomy  Denies any fevers  No diarrhea  No vomiting  Denies any back pain  No chest pain shortness of breath or arthralgias  Appetite is normal       The following portions of the patient's history were reviewed and updated as appropriate: allergies, current medications, past family history, past medical history, past social history, past surgical history and problem list     Review of Systems   Constitutional: Negative  HENT: Negative  Eyes: Negative  Respiratory: Negative  Cardiovascular: Negative  Gastrointestinal:        As noted in HPI   Endocrine: Negative  Genitourinary: Negative  Musculoskeletal: Negative  Skin: Negative  Allergic/Immunologic: Negative  Neurological: Negative  Hematological: Negative  Psychiatric/Behavioral: Negative            Objective:      /92 (BP Location: Left arm, Patient Position: Sitting, Cuff Size: Large)   Pulse 76   Temp 98 2 °F (36 8 °C) (Temporal)   Resp 20   Ht 5' 3 5" (1 613 m)   Wt 94 3 kg (208 lb)   BMI 36 27 kg/m²          Physical Exam   Constitutional: She is oriented to person, place, and time  She appears well-developed and well-nourished  HENT:   Head: Normocephalic and atraumatic  Right Ear: External ear normal  Tympanic membrane is not erythematous and not bulging  Left Ear: External ear normal  Tympanic membrane is not erythematous and not bulging  Nose: Nose normal    Mouth/Throat: Oropharynx is clear and moist and mucous membranes are normal  No oral lesions  No oropharyngeal exudate  Eyes: Conjunctivae and EOM are normal  Right eye exhibits no discharge  Left eye exhibits no discharge  No scleral icterus  Neck: Normal range of motion  Neck supple  No thyromegaly present  Cardiovascular: Normal rate, regular rhythm and normal heart sounds  Exam reveals no gallop and no friction rub  No murmur heard  Pulmonary/Chest: Effort normal  No respiratory distress  She has no wheezes  She has no rales  She exhibits no tenderness  Abdominal: Soft  Bowel sounds are normal  She exhibits no distension and no mass  There is tenderness (Mild-to-moderate tenderness to right upper quadrant  no rebound rigidity or guarding  )  There is no rebound and no guarding  Musculoskeletal: Normal range of motion  She exhibits no edema, tenderness or deformity  Lymphadenopathy:     She has no cervical adenopathy  Neurological: She is alert and oriented to person, place, and time  She has normal reflexes  No cranial nerve deficit  She exhibits normal muscle tone  Coordination normal    Skin: Skin is warm and dry  No rash noted  No erythema  No pallor  Psychiatric: She has a normal mood and affect  Her behavior is normal    Vitals reviewed

## 2019-03-28 ENCOUNTER — TELEPHONE (OUTPATIENT)
Dept: FAMILY MEDICINE CLINIC | Facility: CLINIC | Age: 59
End: 2019-03-28

## 2019-03-28 NOTE — TELEPHONE ENCOUNTER
Pt called requesting results of STAT CT from yesterday  She states that her pain is getting worse and she is thinking of going to the ER  I spoke to Dr Tarah Rodrigues, and he state that the CT was normal, and also states that if her pain is getting worse she should definitely go to the ER  I advised patient of this

## 2019-03-29 ENCOUNTER — HOSPITAL ENCOUNTER (EMERGENCY)
Facility: HOSPITAL | Age: 59
Discharge: HOME | End: 2019-03-29
Attending: EMERGENCY MEDICINE | Admitting: EMERGENCY MEDICINE
Payer: COMMERCIAL

## 2019-03-29 ENCOUNTER — APPOINTMENT (EMERGENCY)
Dept: CT IMAGING | Facility: HOSPITAL | Age: 59
End: 2019-03-29
Payer: COMMERCIAL

## 2019-03-29 VITALS
RESPIRATION RATE: 16 BRPM | HEART RATE: 81 BPM | OXYGEN SATURATION: 99 % | TEMPERATURE: 97.6 F | SYSTOLIC BLOOD PRESSURE: 172 MMHG | DIASTOLIC BLOOD PRESSURE: 82 MMHG | BODY MASS INDEX: 37.9 KG/M2 | WEIGHT: 217.37 LBS

## 2019-03-29 DIAGNOSIS — K52.9 ENTERITIS: ICD-10-CM

## 2019-03-29 DIAGNOSIS — R10.11 COLICKY RUQ ABDOMINAL PAIN: Primary | ICD-10-CM

## 2019-03-29 LAB
ALBUMIN SERPL BCP-MCNC: 4.2 G/DL (ref 3.5–5)
ALP SERPL-CCNC: 93 U/L (ref 46–116)
ALT SERPL W P-5'-P-CCNC: 95 U/L (ref 12–78)
ANION GAP SERPL CALCULATED.3IONS-SCNC: 10 MMOL/L (ref 4–13)
AST SERPL W P-5'-P-CCNC: 47 U/L (ref 5–45)
BACTERIA UR QL AUTO: ABNORMAL /HPF
BASOPHILS # BLD AUTO: 0.05 THOUSANDS/ΜL (ref 0–0.1)
BASOPHILS NFR BLD AUTO: 1 % (ref 0–1)
BILIRUB SERPL-MCNC: 0.44 MG/DL (ref 0.2–1)
BILIRUB UR QL STRIP: NEGATIVE
BUN SERPL-MCNC: 25 MG/DL (ref 5–25)
CALCIUM SERPL-MCNC: 9.5 MG/DL (ref 8.3–10.1)
CHLORIDE SERPL-SCNC: 100 MMOL/L (ref 100–108)
CLARITY UR: ABNORMAL
CO2 SERPL-SCNC: 29 MMOL/L (ref 21–32)
COLOR UR: YELLOW
CREAT SERPL-MCNC: 1.07 MG/DL (ref 0.6–1.3)
EOSINOPHIL # BLD AUTO: 0.13 THOUSAND/ΜL (ref 0–0.61)
EOSINOPHIL NFR BLD AUTO: 2 % (ref 0–6)
ERYTHROCYTE [DISTWIDTH] IN BLOOD BY AUTOMATED COUNT: 12.6 % (ref 11.6–15.1)
GFR SERPL CREATININE-BSD FRML MDRD: 58 ML/MIN/1.73SQ M
GLUCOSE SERPL-MCNC: 116 MG/DL (ref 65–140)
GLUCOSE UR STRIP-MCNC: NEGATIVE MG/DL
HCT VFR BLD AUTO: 38.7 % (ref 34.8–46.1)
HGB BLD-MCNC: 12.8 G/DL (ref 11.5–15.4)
HGB UR QL STRIP.AUTO: ABNORMAL
IMM GRANULOCYTES # BLD AUTO: 0.02 THOUSAND/UL (ref 0–0.2)
IMM GRANULOCYTES NFR BLD AUTO: 0 % (ref 0–2)
KETONES UR STRIP-MCNC: NEGATIVE MG/DL
LACTATE SERPL-SCNC: 0.7 MMOL/L (ref 0.5–2)
LEUKOCYTE ESTERASE UR QL STRIP: ABNORMAL
LIPASE SERPL-CCNC: 146 U/L (ref 73–393)
LYMPHOCYTES # BLD AUTO: 1.98 THOUSANDS/ΜL (ref 0.6–4.47)
LYMPHOCYTES NFR BLD AUTO: 31 % (ref 14–44)
MCH RBC QN AUTO: 28.3 PG (ref 26.8–34.3)
MCHC RBC AUTO-ENTMCNC: 33.1 G/DL (ref 31.4–37.4)
MCV RBC AUTO: 86 FL (ref 82–98)
MONOCYTES # BLD AUTO: 0.55 THOUSAND/ΜL (ref 0.17–1.22)
MONOCYTES NFR BLD AUTO: 9 % (ref 4–12)
MUCOUS THREADS UR QL AUTO: ABNORMAL
NEUTROPHILS # BLD AUTO: 3.75 THOUSANDS/ΜL (ref 1.85–7.62)
NEUTS SEG NFR BLD AUTO: 57 % (ref 43–75)
NITRITE UR QL STRIP: NEGATIVE
NON-SQ EPI CELLS URNS QL MICRO: ABNORMAL /HPF
NRBC BLD AUTO-RTO: 0 /100 WBCS
PH UR STRIP.AUTO: 5.5 [PH] (ref 4.5–8)
PLATELET # BLD AUTO: 228 THOUSANDS/UL (ref 149–390)
PMV BLD AUTO: 10.7 FL (ref 8.9–12.7)
POTASSIUM SERPL-SCNC: 3.4 MMOL/L (ref 3.5–5.3)
PROT SERPL-MCNC: 7.7 G/DL (ref 6.4–8.2)
PROT UR STRIP-MCNC: NEGATIVE MG/DL
RBC # BLD AUTO: 4.52 MILLION/UL (ref 3.81–5.12)
RBC #/AREA URNS AUTO: ABNORMAL /HPF
SODIUM SERPL-SCNC: 139 MMOL/L (ref 136–145)
SP GR UR STRIP.AUTO: >=1.03 (ref 1–1.03)
UROBILINOGEN UR QL STRIP.AUTO: 1 E.U./DL
WBC # BLD AUTO: 6.48 THOUSAND/UL (ref 4.31–10.16)
WBC #/AREA URNS AUTO: ABNORMAL /HPF

## 2019-03-29 PROCEDURE — 80053 COMPREHEN METABOLIC PANEL: CPT | Performed by: EMERGENCY MEDICINE

## 2019-03-29 PROCEDURE — 99284 EMERGENCY DEPT VISIT MOD MDM: CPT

## 2019-03-29 PROCEDURE — 81001 URINALYSIS AUTO W/SCOPE: CPT

## 2019-03-29 PROCEDURE — 87086 URINE CULTURE/COLONY COUNT: CPT

## 2019-03-29 PROCEDURE — 96361 HYDRATE IV INFUSION ADD-ON: CPT

## 2019-03-29 PROCEDURE — 96374 THER/PROPH/DIAG INJ IV PUSH: CPT

## 2019-03-29 PROCEDURE — 83605 ASSAY OF LACTIC ACID: CPT | Performed by: EMERGENCY MEDICINE

## 2019-03-29 PROCEDURE — 85025 COMPLETE CBC W/AUTO DIFF WBC: CPT | Performed by: EMERGENCY MEDICINE

## 2019-03-29 PROCEDURE — 74177 CT ABD & PELVIS W/CONTRAST: CPT

## 2019-03-29 PROCEDURE — 83690 ASSAY OF LIPASE: CPT | Performed by: EMERGENCY MEDICINE

## 2019-03-29 PROCEDURE — 36415 COLL VENOUS BLD VENIPUNCTURE: CPT | Performed by: EMERGENCY MEDICINE

## 2019-03-29 RX ORDER — DICYCLOMINE HCL 20 MG
20 TABLET ORAL ONCE
Status: COMPLETED | OUTPATIENT
Start: 2019-03-29 | End: 2019-03-29

## 2019-03-29 RX ORDER — KETOROLAC TROMETHAMINE 30 MG/ML
30 INJECTION, SOLUTION INTRAMUSCULAR; INTRAVENOUS ONCE
Status: COMPLETED | OUTPATIENT
Start: 2019-03-29 | End: 2019-03-29

## 2019-03-29 RX ORDER — DICYCLOMINE HCL 20 MG
20 TABLET ORAL EVERY 6 HOURS PRN
Qty: 20 TABLET | Refills: 0 | Status: SHIPPED | OUTPATIENT
Start: 2019-03-29 | End: 2019-08-05

## 2019-03-29 RX ADMIN — DICYCLOMINE HYDROCHLORIDE 20 MG: 20 TABLET ORAL at 02:28

## 2019-03-29 RX ADMIN — SODIUM CHLORIDE 1000 ML: 0.9 INJECTION, SOLUTION INTRAVENOUS at 01:18

## 2019-03-29 RX ADMIN — KETOROLAC TROMETHAMINE 30 MG: 30 INJECTION, SOLUTION INTRAMUSCULAR; INTRAVENOUS at 01:39

## 2019-03-29 RX ADMIN — IOHEXOL 100 ML: 350 INJECTION, SOLUTION INTRAVENOUS at 01:56

## 2019-03-30 LAB — BACTERIA UR CULT: NORMAL

## 2019-04-11 DIAGNOSIS — G43.909 MIGRAINE WITHOUT STATUS MIGRAINOSUS, NOT INTRACTABLE, UNSPECIFIED MIGRAINE TYPE: ICD-10-CM

## 2019-04-11 RX ORDER — RIZATRIPTAN BENZOATE 10 MG/1
10 TABLET, ORALLY DISINTEGRATING ORAL DAILY PRN
Qty: 90 TABLET | Refills: 0 | Status: SHIPPED | OUTPATIENT
Start: 2019-04-11 | End: 2019-04-17

## 2019-04-16 DIAGNOSIS — G43.909 MIGRAINE WITHOUT STATUS MIGRAINOSUS, NOT INTRACTABLE, UNSPECIFIED MIGRAINE TYPE: ICD-10-CM

## 2019-04-17 DIAGNOSIS — G43.909 MIGRAINE WITHOUT STATUS MIGRAINOSUS, NOT INTRACTABLE, UNSPECIFIED MIGRAINE TYPE: ICD-10-CM

## 2019-04-17 RX ORDER — RIZATRIPTAN BENZOATE 10 MG/1
TABLET, ORALLY DISINTEGRATING ORAL
Qty: 90 TABLET | Refills: 0 | OUTPATIENT
Start: 2019-04-17

## 2019-04-17 RX ORDER — RIZATRIPTAN BENZOATE 10 MG/1
TABLET, ORALLY DISINTEGRATING ORAL
Qty: 9 TABLET | Refills: 0 | Status: SHIPPED | OUTPATIENT
Start: 2019-04-17 | End: 2019-09-05 | Stop reason: SDUPTHER

## 2019-05-13 ENCOUNTER — TELEPHONE (OUTPATIENT)
Dept: FAMILY MEDICINE CLINIC | Facility: CLINIC | Age: 59
End: 2019-05-13

## 2019-05-24 ENCOUNTER — OFFICE VISIT (OUTPATIENT)
Dept: URGENT CARE | Facility: MEDICAL CENTER | Age: 59
End: 2019-05-24
Payer: COMMERCIAL

## 2019-05-24 ENCOUNTER — APPOINTMENT (OUTPATIENT)
Dept: RADIOLOGY | Facility: MEDICAL CENTER | Age: 59
End: 2019-05-24
Attending: PHYSICIAN ASSISTANT
Payer: COMMERCIAL

## 2019-05-24 VITALS
TEMPERATURE: 98.1 F | OXYGEN SATURATION: 98 % | SYSTOLIC BLOOD PRESSURE: 142 MMHG | DIASTOLIC BLOOD PRESSURE: 91 MMHG | HEART RATE: 72 BPM | WEIGHT: 200 LBS | HEIGHT: 64 IN | BODY MASS INDEX: 34.15 KG/M2 | RESPIRATION RATE: 18 BRPM

## 2019-05-24 DIAGNOSIS — S99.922A INJURY OF TOE ON LEFT FOOT, INITIAL ENCOUNTER: ICD-10-CM

## 2019-05-24 DIAGNOSIS — S99.922A INJURY OF TOE ON LEFT FOOT, INITIAL ENCOUNTER: Primary | ICD-10-CM

## 2019-05-24 PROCEDURE — 99211 OFF/OP EST MAY X REQ PHY/QHP: CPT | Performed by: PHYSICIAN ASSISTANT

## 2019-05-24 PROCEDURE — 73660 X-RAY EXAM OF TOE(S): CPT

## 2019-07-26 ENCOUNTER — OFFICE VISIT (OUTPATIENT)
Dept: FAMILY MEDICINE CLINIC | Facility: CLINIC | Age: 59
End: 2019-07-26
Payer: COMMERCIAL

## 2019-07-26 VITALS
HEIGHT: 63 IN | TEMPERATURE: 97.8 F | DIASTOLIC BLOOD PRESSURE: 80 MMHG | HEART RATE: 68 BPM | OXYGEN SATURATION: 99 % | WEIGHT: 185 LBS | BODY MASS INDEX: 32.78 KG/M2 | SYSTOLIC BLOOD PRESSURE: 138 MMHG

## 2019-07-26 DIAGNOSIS — K29.70 GASTRITIS WITHOUT BLEEDING, UNSPECIFIED CHRONICITY, UNSPECIFIED GASTRITIS TYPE: Primary | ICD-10-CM

## 2019-07-26 DIAGNOSIS — Z12.11 ENCOUNTER FOR FIT (FECAL IMMUNOCHEMICAL TEST) SCREENING: ICD-10-CM

## 2019-07-26 PROCEDURE — 99213 OFFICE O/P EST LOW 20 MIN: CPT | Performed by: FAMILY MEDICINE

## 2019-07-26 RX ORDER — OMEPRAZOLE 20 MG/1
20 CAPSULE, DELAYED RELEASE ORAL
Qty: 30 CAPSULE | Refills: 1 | Status: SHIPPED | OUTPATIENT
Start: 2019-07-26 | End: 2021-11-12 | Stop reason: ALTCHOICE

## 2019-07-26 NOTE — PROGRESS NOTES
Assessment/Plan:  No significant findings on exam today  Recommend omeprazole  Follow-up with gastroenterologist or call our office if no improvement or worsening symptoms  Obtain blood testing if no improvement  Script given  No problem-specific Assessment & Plan notes found for this encounter  Diagnoses and all orders for this visit:    Gastritis without bleeding, unspecified chronicity, unspecified gastritis type  -     omeprazole (PriLOSEC) 20 mg delayed release capsule; Take 1 capsule (20 mg total) by mouth daily before breakfast  -     Lipase  -     CBC and differential  -     Comprehensive metabolic panel  -     Lipase    Encounter for FIT (fecal immunochemical test) screening  -     Occult Blood, Fecal Immunochemical; Future          Subjective:      Patient ID: Beatris Thomas is a 64 y o  female  Patient here today for 1 week history of epigastric discomfort  Symptoms are mild  Symptoms worsen after not eating  She states the food generally makes things better  She denies any fever sweats or chills  The following portions of the patient's history were reviewed and updated as appropriate: allergies, current medications, past family history, past medical history, past social history, past surgical history and problem list     Review of Systems   Constitutional: Negative  HENT: Negative  Eyes: Negative  Respiratory: Negative  Cardiovascular: Negative  Gastrointestinal:        As noted in HPI   Endocrine: Negative  Genitourinary: Negative  Musculoskeletal: Negative  Skin: Negative  Allergic/Immunologic: Negative  Neurological: Negative  Hematological: Negative  Psychiatric/Behavioral: Negative            Objective:      /80 (BP Location: Left arm, Patient Position: Sitting, Cuff Size: Standard)   Pulse 68   Temp 97 8 °F (36 6 °C) (Tympanic)   Ht 5' 3 39" (1 61 m)   Wt 83 9 kg (185 lb)   LMP  (LMP Unknown)   SpO2 99%   BMI 32 37 kg/m² Physical Exam   Constitutional: She is oriented to person, place, and time  She appears well-developed and well-nourished  HENT:   Head: Normocephalic and atraumatic  Right Ear: External ear normal  Tympanic membrane is not erythematous and not bulging  Left Ear: External ear normal  Tympanic membrane is not erythematous and not bulging  Nose: Nose normal    Mouth/Throat: Oropharynx is clear and moist and mucous membranes are normal  No oral lesions  No oropharyngeal exudate  Eyes: Conjunctivae and EOM are normal  Right eye exhibits no discharge  Left eye exhibits no discharge  No scleral icterus  Neck: Normal range of motion  Neck supple  No thyromegaly present  Cardiovascular: Normal rate, regular rhythm and normal heart sounds  Exam reveals no gallop and no friction rub  No murmur heard  Pulmonary/Chest: Effort normal  No respiratory distress  She has no wheezes  She has no rales  She exhibits no tenderness  Abdominal: Soft  Bowel sounds are normal  She exhibits no distension and no mass  There is no tenderness  There is no rebound and no guarding  Musculoskeletal: Normal range of motion  She exhibits no edema, tenderness or deformity  Lymphadenopathy:     She has no cervical adenopathy  Neurological: She is alert and oriented to person, place, and time  She has normal reflexes  No cranial nerve deficit  She exhibits normal muscle tone  Coordination normal    Skin: Skin is warm and dry  No rash noted  No erythema  No pallor  Psychiatric: She has a normal mood and affect  Her behavior is normal    Vitals reviewed

## 2019-07-26 NOTE — PROGRESS NOTES
BMI Counseling: Body mass index is 32 37 kg/m²  Discussed the patient's BMI with her  The BMI is above average  BMI counseling and education was provided to the patient  Nutrition recommendations include 3-5 servings of fruits/vegetables daily

## 2019-08-05 ENCOUNTER — OFFICE VISIT (OUTPATIENT)
Dept: FAMILY MEDICINE CLINIC | Facility: CLINIC | Age: 59
End: 2019-08-05
Payer: COMMERCIAL

## 2019-08-05 ENCOUNTER — TELEPHONE (OUTPATIENT)
Dept: FAMILY MEDICINE CLINIC | Facility: CLINIC | Age: 59
End: 2019-08-05

## 2019-08-05 VITALS
TEMPERATURE: 98 F | BODY MASS INDEX: 31.58 KG/M2 | DIASTOLIC BLOOD PRESSURE: 98 MMHG | SYSTOLIC BLOOD PRESSURE: 144 MMHG | HEIGHT: 64 IN | WEIGHT: 185 LBS

## 2019-08-05 DIAGNOSIS — R35.0 URINARY FREQUENCY: Primary | ICD-10-CM

## 2019-08-05 LAB
SL AMB  POCT GLUCOSE, UA: NORMAL
SL AMB LEUKOCYTE ESTERASE,UA: ABNORMAL
SL AMB POCT BILIRUBIN,UA: ABNORMAL
SL AMB POCT BLOOD,UA: ABNORMAL
SL AMB POCT CLARITY,UA: CLEAR
SL AMB POCT COLOR,UA: YELLOW
SL AMB POCT KETONES,UA: ABNORMAL
SL AMB POCT NITRITE,UA: ABNORMAL
SL AMB POCT PH,UA: 6
SL AMB POCT SPECIFIC GRAVITY,UA: 1
SL AMB POCT URINE PROTEIN: ABNORMAL
SL AMB POCT UROBILINOGEN: NORMAL

## 2019-08-05 PROCEDURE — 99213 OFFICE O/P EST LOW 20 MIN: CPT | Performed by: FAMILY MEDICINE

## 2019-08-05 PROCEDURE — 87086 URINE CULTURE/COLONY COUNT: CPT | Performed by: FAMILY MEDICINE

## 2019-08-05 PROCEDURE — 87077 CULTURE AEROBIC IDENTIFY: CPT | Performed by: FAMILY MEDICINE

## 2019-08-05 PROCEDURE — 81002 URINALYSIS NONAUTO W/O SCOPE: CPT | Performed by: FAMILY MEDICINE

## 2019-08-05 PROCEDURE — 87186 SC STD MICRODIL/AGAR DIL: CPT | Performed by: FAMILY MEDICINE

## 2019-08-05 RX ORDER — CIPROFLOXACIN 500 MG/1
500 TABLET, FILM COATED ORAL EVERY 12 HOURS SCHEDULED
Qty: 10 TABLET | Refills: 0 | Status: SHIPPED | OUTPATIENT
Start: 2019-08-05 | End: 2019-08-10

## 2019-08-05 NOTE — TELEPHONE ENCOUNTER
Pt called in when office was closed stating she believes she has a UTI and is requesting an antibiotic be sent to her pharmacy, please advise if you will send a script or if pt needs appt

## 2019-08-05 NOTE — PROGRESS NOTES
Assessment/Plan:  Side effect profile medication reviewed  Recommend return to office if no improvement or worsening symptoms  No problem-specific Assessment & Plan notes found for this encounter  Diagnoses and all orders for this visit:    Urinary frequency  -     POCT urine dip  -     Urine culture; Future  -     ciprofloxacin (CIPRO) 500 mg tablet; Take 1 tablet (500 mg total) by mouth every 12 (twelve) hours for 5 days  -     Urine culture          Subjective:      Patient ID: Eunice Delaney is a 64 y o  female  Patient with UTI symptoms over the last few days  Symptoms are mild-to-moderate  She notes increased urinary frequency or urgency and occasional burning with urination  No GI complaints  No fevers  The following portions of the patient's history were reviewed and updated as appropriate: allergies, current medications, past family history, past medical history, past social history, past surgical history and problem list     Review of Systems   Constitutional: Negative  HENT: Negative  Eyes: Negative  Respiratory: Negative  Cardiovascular: Negative  Gastrointestinal: Negative  Endocrine: Negative  Genitourinary: Negative  As noted in HPI   Musculoskeletal: Negative  Skin: Negative  Allergic/Immunologic: Negative  Neurological: Negative  Hematological: Negative  Psychiatric/Behavioral: Negative  Objective:      /98 (BP Location: Left arm, Patient Position: Sitting, Cuff Size: Standard)   Temp 98 °F (36 7 °C) (Tympanic)   Ht 5' 3 5" (1 613 m)   Wt 83 9 kg (185 lb)   LMP  (LMP Unknown)   BMI 32 26 kg/m²          Physical Exam   Constitutional: She is oriented to person, place, and time  She appears well-developed and well-nourished  HENT:   Head: Normocephalic and atraumatic  Right Ear: External ear normal  Tympanic membrane is not erythematous and not bulging     Left Ear: External ear normal  Tympanic membrane is not erythematous and not bulging  Nose: Nose normal    Mouth/Throat: Oropharynx is clear and moist and mucous membranes are normal  No oral lesions  No oropharyngeal exudate  Eyes: Conjunctivae and EOM are normal  Right eye exhibits no discharge  Left eye exhibits no discharge  No scleral icterus  Neck: Normal range of motion  Neck supple  No thyromegaly present  Cardiovascular: Normal rate, regular rhythm and normal heart sounds  Exam reveals no gallop and no friction rub  No murmur heard  Pulmonary/Chest: Effort normal  No respiratory distress  She has no wheezes  She has no rales  She exhibits no tenderness  Abdominal: Soft  Bowel sounds are normal  She exhibits no distension and no mass  There is no tenderness  There is no rebound and no guarding  Musculoskeletal: Normal range of motion  She exhibits no edema, tenderness or deformity  Lymphadenopathy:     She has no cervical adenopathy  Neurological: She is alert and oriented to person, place, and time  She has normal reflexes  No cranial nerve deficit  She exhibits normal muscle tone  Coordination normal    Skin: Skin is warm and dry  No rash noted  No erythema  No pallor  Psychiatric: She has a normal mood and affect  Her behavior is normal    Vitals reviewed

## 2019-08-07 ENCOUNTER — TELEPHONE (OUTPATIENT)
Dept: FAMILY MEDICINE CLINIC | Facility: CLINIC | Age: 59
End: 2019-08-07

## 2019-08-07 LAB — BACTERIA UR CULT: ABNORMAL

## 2019-08-07 NOTE — TELEPHONE ENCOUNTER
Antibiotic prescribed should be helping with infection seen  Recommend office re-evaluation in 1-2 days if no improvement  She can use azo over-the-counter if no improvement as well

## 2019-08-07 NOTE — TELEPHONE ENCOUNTER
Pt was seen in office 08/05/2019 for UTI  Pt states that her sx are not improving has been on abx for 2 days  Pt c/o lower back pain, and urinary urgency  Negative for urinary burning and fever  Pt is currently taking Cipro 500mg BID    Preliminary urine c&s in chart  Please advise

## 2019-08-09 ENCOUNTER — HOSPITAL ENCOUNTER (OUTPATIENT)
Dept: ULTRASOUND IMAGING | Facility: MEDICAL CENTER | Age: 59
Discharge: HOME | End: 2019-08-09
Payer: COMMERCIAL

## 2019-08-09 ENCOUNTER — OFFICE VISIT (OUTPATIENT)
Dept: FAMILY MEDICINE CLINIC | Facility: CLINIC | Age: 59
End: 2019-08-09
Payer: COMMERCIAL

## 2019-08-09 VITALS
BODY MASS INDEX: 35.26 KG/M2 | HEART RATE: 66 BPM | TEMPERATURE: 97.9 F | DIASTOLIC BLOOD PRESSURE: 88 MMHG | WEIGHT: 199 LBS | OXYGEN SATURATION: 99 % | HEIGHT: 63 IN | SYSTOLIC BLOOD PRESSURE: 120 MMHG

## 2019-08-09 DIAGNOSIS — M54.50 ACUTE BILATERAL LOW BACK PAIN WITHOUT SCIATICA: ICD-10-CM

## 2019-08-09 DIAGNOSIS — R39.9 UTI SYMPTOMS: Primary | ICD-10-CM

## 2019-08-09 DIAGNOSIS — R39.9 UTI SYMPTOMS: ICD-10-CM

## 2019-08-09 DIAGNOSIS — K76.0 FATTY LIVER: ICD-10-CM

## 2019-08-09 LAB
SL AMB  POCT GLUCOSE, UA: NORMAL
SL AMB LEUKOCYTE ESTERASE,UA: ABNORMAL
SL AMB POCT BILIRUBIN,UA: NEGATIVE
SL AMB POCT BLOOD,UA: ABNORMAL
SL AMB POCT CLARITY,UA: CLEAR
SL AMB POCT COLOR,UA: YELLOW
SL AMB POCT KETONES,UA: NEGATIVE
SL AMB POCT NITRITE,UA: NEGATIVE
SL AMB POCT PH,UA: 7
SL AMB POCT SPECIFIC GRAVITY,UA: 1
SL AMB POCT URINE PROTEIN: NEGATIVE
SL AMB POCT UROBILINOGEN: NORMAL

## 2019-08-09 PROCEDURE — 99214 OFFICE O/P EST MOD 30 MIN: CPT | Performed by: NURSE PRACTITIONER

## 2019-08-09 PROCEDURE — 87086 URINE CULTURE/COLONY COUNT: CPT | Performed by: NURSE PRACTITIONER

## 2019-08-09 PROCEDURE — 1036F TOBACCO NON-USER: CPT | Performed by: NURSE PRACTITIONER

## 2019-08-09 PROCEDURE — 81002 URINALYSIS NONAUTO W/O SCOPE: CPT | Performed by: NURSE PRACTITIONER

## 2019-08-09 PROCEDURE — 3008F BODY MASS INDEX DOCD: CPT | Performed by: NURSE PRACTITIONER

## 2019-08-09 PROCEDURE — 76770 US EXAM ABDO BACK WALL COMP: CPT

## 2019-08-09 RX ORDER — MELOXICAM 15 MG/1
TABLET ORAL
Qty: 15 TABLET | Refills: 0 | Status: SHIPPED | OUTPATIENT
Start: 2019-08-09 | End: 2020-11-11 | Stop reason: SDUPTHER

## 2019-08-09 NOTE — PROGRESS NOTES
Assessment/Plan:    No problem-specific Assessment & Plan notes found for this encounter  Diagnoses and all orders for this visit:    UTI symptoms  Acute bilateral low back pain without sciatica  -     POCT urine dip  -     Urine culture  -     US kidney and bladder; stat  -     meloxicam (MOBIC) 15 mg tablet; Take one tablet daily for low back pain as needed  Take with food  Strain urine with a coffee filter and collect stones, if any, in specimen cup  Increase fluids to 8 oz every 2 hrs  ED if symptoms worsen over the weekend  8/9/19 1500: called pt and informed that U/S showed no kidney stones, but did incidentally show a fatty liver  Because pt recently had a bout of gastritis, will refer to GI Dr Maribel Malave for further eval  Discussed importance of weight loss with low-fat, -carg diet and exercise  If current symptoms worsen, go to ED  Subjective:      Patient ID: Marisa Bobo is a 64 y o  female  Pt has taken 5 days of cipro 500 mg bid for URI which is sensitive to that antibiotic  Has persistent 6-7/10 pain in lower back  Took tylenol last night  Pain was worse last night, but able to sleep well  Still has urgency and frequency  Voiding in small amounts  Voided once last night  Has occasional chills but no fever  Has nausea but no vomiting  No vaginal discharge or itch  No history of kidney stones  Has done no twisting, lifting, or bending  Has history of cholecystectomy; has her appendix  The following portions of the patient's history were reviewed and updated as appropriate: allergies, current medications, past family history, past medical history, past social history, past surgical history and problem list     Review of Systems   Constitutional: Positive for chills and fatigue  Negative for activity change, appetite change and fever  HENT: Negative for congestion, sneezing, sore throat and trouble swallowing  Respiratory: Negative for cough and shortness of breath  Cardiovascular: Negative for chest pain, palpitations and leg swelling  Gastrointestinal: Positive for nausea  Negative for abdominal distention, abdominal pain, diarrhea and vomiting  Genitourinary: Positive for decreased urine volume, frequency and urgency  Negative for difficulty urinating, dysuria, flank pain, hematuria, pelvic pain and vaginal discharge  Musculoskeletal: Positive for back pain  Neurological: Positive for dizziness  Objective:      /88 (BP Location: Left arm, Patient Position: Sitting, Cuff Size: Standard)   Pulse 66   Temp 97 9 °F (36 6 °C) (Tympanic)   Ht 5' 3 39" (1 61 m)   Wt 90 3 kg (199 lb)   LMP  (LMP Unknown)   SpO2 99%   BMI 34 82 kg/m²          Physical Exam   Constitutional: She is oriented to person, place, and time  She appears well-developed and well-nourished  Eyes: EOM are normal    Neck: Normal range of motion  Neck supple  Cardiovascular: Normal rate, regular rhythm, normal heart sounds and intact distal pulses  Pulmonary/Chest: Effort normal and breath sounds normal  She has no wheezes  She has no rales  Abdominal: Soft  Bowel sounds are normal  She exhibits no distension and no mass  There is no tenderness  There is no rebound and no guarding  No CVAT   Musculoskeletal: Normal range of motion  Neurological: She is alert and oriented to person, place, and time  Skin: Skin is warm and dry  Psychiatric: She has a normal mood and affect

## 2019-08-10 LAB — BACTERIA UR CULT: NORMAL

## 2019-09-05 DIAGNOSIS — G43.909 MIGRAINE WITHOUT STATUS MIGRAINOSUS, NOT INTRACTABLE, UNSPECIFIED MIGRAINE TYPE: ICD-10-CM

## 2019-09-06 RX ORDER — RIZATRIPTAN BENZOATE 10 MG/1
TABLET, ORALLY DISINTEGRATING ORAL
Qty: 9 TABLET | Refills: 0 | Status: SHIPPED | OUTPATIENT
Start: 2019-09-06 | End: 2019-10-08 | Stop reason: SDUPTHER

## 2019-09-17 ENCOUNTER — OFFICE VISIT (OUTPATIENT)
Dept: OBGYN CLINIC | Facility: MEDICAL CENTER | Age: 59
End: 2019-09-17
Payer: COMMERCIAL

## 2019-09-17 VITALS — BODY MASS INDEX: 34.23 KG/M2 | DIASTOLIC BLOOD PRESSURE: 98 MMHG | WEIGHT: 195.6 LBS | SYSTOLIC BLOOD PRESSURE: 132 MMHG

## 2019-09-17 DIAGNOSIS — L29.2 VULVAR ITCHING: Primary | ICD-10-CM

## 2019-09-17 PROCEDURE — 99213 OFFICE O/P EST LOW 20 MIN: CPT | Performed by: NURSE PRACTITIONER

## 2019-09-17 RX ORDER — NYSTATIN AND TRIAMCINOLONE ACETONIDE 100000; 1 [USP'U]/G; MG/G
OINTMENT TOPICAL 2 TIMES DAILY
Qty: 30 G | Refills: 1 | Status: SHIPPED | OUTPATIENT
Start: 2019-09-17 | End: 2021-11-12 | Stop reason: ALTCHOICE
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
## 2019-09-19 ENCOUNTER — HOSPITAL ENCOUNTER (OUTPATIENT)
Dept: ULTRASOUND IMAGING | Facility: CLINIC | Age: 59
Discharge: HOME | End: 2019-09-19
Payer: COMMERCIAL

## 2019-09-19 VITALS — WEIGHT: 195 LBS | HEIGHT: 63 IN | BODY MASS INDEX: 34.55 KG/M2

## 2019-09-19 DIAGNOSIS — N63.15 BREAST LUMP ON RIGHT SIDE AT 3 O'CLOCK POSITION: ICD-10-CM

## 2019-09-19 PROCEDURE — 76642 ULTRASOUND BREAST LIMITED: CPT

## 2019-10-08 DIAGNOSIS — G43.909 MIGRAINE WITHOUT STATUS MIGRAINOSUS, NOT INTRACTABLE, UNSPECIFIED MIGRAINE TYPE: ICD-10-CM

## 2019-10-08 RX ORDER — RIZATRIPTAN BENZOATE 10 MG/1
TABLET, ORALLY DISINTEGRATING ORAL
Qty: 9 TABLET | Refills: 0 | Status: SHIPPED | OUTPATIENT
Start: 2019-10-08 | End: 2019-11-08 | Stop reason: SDUPTHER

## 2019-11-04 ENCOUNTER — CONSULT (OUTPATIENT)
Dept: GASTROENTEROLOGY | Facility: MEDICAL CENTER | Age: 59
End: 2019-11-04
Payer: COMMERCIAL

## 2019-11-04 VITALS
SYSTOLIC BLOOD PRESSURE: 118 MMHG | TEMPERATURE: 98.4 F | DIASTOLIC BLOOD PRESSURE: 74 MMHG | HEART RATE: 74 BPM | HEIGHT: 63 IN | WEIGHT: 197 LBS | BODY MASS INDEX: 34.91 KG/M2

## 2019-11-04 DIAGNOSIS — D32.9 BENIGN MENINGIOMA (HCC): ICD-10-CM

## 2019-11-04 DIAGNOSIS — E03.9 HYPOTHYROIDISM, UNSPECIFIED TYPE: ICD-10-CM

## 2019-11-04 DIAGNOSIS — K76.0 FATTY LIVER: Primary | ICD-10-CM

## 2019-11-04 PROCEDURE — 99244 OFF/OP CNSLTJ NEW/EST MOD 40: CPT | Performed by: INTERNAL MEDICINE

## 2019-11-04 NOTE — PATIENT INSTRUCTIONS
Today we discussed:  -- We will get blood work to look for potential causes of the elevated liver tests and the fatty liver  -- We will also get a special ultrasound to evaluate for scar tissue in the liver  -- I recommend you continue to work on trying to lose weight  Even 10-20 lbs of weight loss can help improve the fat in the liver  -- I recommend you get a test done for colon cancer screening  Ideally, we can get a colonoscopy, as this can also removed pre-cancerous polyps  If you do not want a colonoscopy, at least get the stool based testing performed  -- Continue to see Dr Azael Guzman for the benign meningioma      Patient was given labs and US  Patient will schedule US at her convenience  Recall set for 3 month follow up as the schedule has not opened yet
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
## 2019-11-08 DIAGNOSIS — G43.909 MIGRAINE WITHOUT STATUS MIGRAINOSUS, NOT INTRACTABLE, UNSPECIFIED MIGRAINE TYPE: ICD-10-CM

## 2019-11-12 LAB
HBA1C MFR BLD HPLC: 5.5 %
HCV AB SER-ACNC: NEGATIVE

## 2019-11-12 RX ORDER — RIZATRIPTAN BENZOATE 10 MG/1
TABLET, ORALLY DISINTEGRATING ORAL
Qty: 9 TABLET | Refills: 0 | Status: SHIPPED | OUTPATIENT
Start: 2019-11-12 | End: 2019-11-20 | Stop reason: SDUPTHER

## 2019-11-13 ENCOUNTER — HOSPITAL ENCOUNTER (OUTPATIENT)
Dept: RADIOLOGY | Facility: HOSPITAL | Age: 59
Discharge: HOME | End: 2019-11-13
Attending: INTERNAL MEDICINE
Payer: COMMERCIAL

## 2019-11-13 DIAGNOSIS — E03.9 HYPOTHYROIDISM, UNSPECIFIED TYPE: ICD-10-CM

## 2019-11-13 DIAGNOSIS — K76.0 FATTY LIVER: ICD-10-CM

## 2019-11-13 PROCEDURE — 76981 USE PARENCHYMA: CPT

## 2019-11-18 DIAGNOSIS — E03.9 HYPOTHYROIDISM, UNSPECIFIED TYPE: ICD-10-CM

## 2019-11-19 RX ORDER — LEVOTHYROXINE SODIUM 0.05 MG/1
TABLET ORAL
Qty: 90 TABLET | Refills: 4 | Status: SHIPPED | OUTPATIENT
Start: 2019-11-19 | End: 2021-02-12

## 2019-11-20 ENCOUNTER — OFFICE VISIT (OUTPATIENT)
Dept: FAMILY MEDICINE CLINIC | Facility: CLINIC | Age: 59
End: 2019-11-20
Payer: COMMERCIAL

## 2019-11-20 VITALS
BODY MASS INDEX: 34.91 KG/M2 | TEMPERATURE: 96.8 F | WEIGHT: 197 LBS | SYSTOLIC BLOOD PRESSURE: 140 MMHG | HEIGHT: 63 IN | DIASTOLIC BLOOD PRESSURE: 80 MMHG | HEART RATE: 77 BPM | OXYGEN SATURATION: 98 %

## 2019-11-20 DIAGNOSIS — M25.511 CHRONIC RIGHT SHOULDER PAIN: ICD-10-CM

## 2019-11-20 DIAGNOSIS — S06.0X0S CONCUSSION WITHOUT LOSS OF CONSCIOUSNESS, SEQUELA (HCC): ICD-10-CM

## 2019-11-20 DIAGNOSIS — G43.909 MIGRAINE WITHOUT STATUS MIGRAINOSUS, NOT INTRACTABLE, UNSPECIFIED MIGRAINE TYPE: ICD-10-CM

## 2019-11-20 DIAGNOSIS — F43.21 GRIEF: ICD-10-CM

## 2019-11-20 DIAGNOSIS — G89.29 CHRONIC RIGHT SHOULDER PAIN: ICD-10-CM

## 2019-11-20 PROCEDURE — 99214 OFFICE O/P EST MOD 30 MIN: CPT | Performed by: NURSE PRACTITIONER

## 2019-11-20 PROCEDURE — 1036F TOBACCO NON-USER: CPT | Performed by: NURSE PRACTITIONER

## 2019-11-20 RX ORDER — RIZATRIPTAN BENZOATE 10 MG/1
TABLET, ORALLY DISINTEGRATING ORAL
Qty: 9 TABLET | Refills: 0 | Status: SHIPPED | OUTPATIENT
Start: 2019-11-20 | End: 2020-05-19 | Stop reason: SDUPTHER
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
## 2019-11-22 ENCOUNTER — TELEPHONE (OUTPATIENT)
Dept: FAMILY MEDICINE CLINIC | Facility: CLINIC | Age: 59
End: 2019-11-22

## 2019-11-22 DIAGNOSIS — I10 ESSENTIAL HYPERTENSION: Primary | ICD-10-CM

## 2019-11-22 DIAGNOSIS — S06.0X0S CONCUSSION WITHOUT LOSS OF CONSCIOUSNESS, SEQUELA (HCC): Primary | ICD-10-CM

## 2019-11-22 RX ORDER — LOSARTAN POTASSIUM 50 MG/1
50 TABLET ORAL DAILY
Qty: 90 TABLET | Refills: 1 | Status: SHIPPED | OUTPATIENT
Start: 2019-11-22 | End: 2020-05-18

## 2019-11-22 RX ORDER — HYDROCHLOROTHIAZIDE 12.5 MG/1
12.5 TABLET ORAL DAILY
Qty: 90 TABLET | Refills: 1 | Status: SHIPPED | OUTPATIENT
Start: 2019-11-22 | End: 2020-05-18

## 2019-11-22 NOTE — TELEPHONE ENCOUNTER
Pt called, stating that pharmacy does not have losartan-HCTZ 50-12 5mg  Can you send an orders for losartan and HCTZ separately? She needs a 90 day supply of each, as they go to [facility] m/o  Please advise  Thank you

## 2020-02-06 ENCOUNTER — HOSPITAL ENCOUNTER (OUTPATIENT)
Dept: MRI IMAGING | Facility: HOSPITAL | Age: 60
Discharge: HOME | End: 2020-02-06
Attending: RADIOLOGY
Payer: COMMERCIAL

## 2020-02-06 DIAGNOSIS — D32.9 BENIGN MENINGIOMA (HCC): ICD-10-CM

## 2020-02-06 PROCEDURE — A9585 GADOBUTROL INJECTION: HCPCS | Performed by: RADIOLOGY

## 2020-02-06 PROCEDURE — 70553 MRI BRAIN STEM W/O & W/DYE: CPT

## 2020-02-06 RX ADMIN — GADOBUTROL 9 ML: 604.72 INJECTION INTRAVENOUS at 09:30

## 2020-02-19 ENCOUNTER — RADIATION ONCOLOGY FOLLOW-UP (OUTPATIENT)
Dept: RADIATION ONCOLOGY | Facility: HOSPITAL | Age: 60
End: 2020-02-19
Attending: RADIOLOGY
Payer: COMMERCIAL

## 2020-02-19 VITALS
WEIGHT: 198.8 LBS | SYSTOLIC BLOOD PRESSURE: 130 MMHG | HEART RATE: 96 BPM | DIASTOLIC BLOOD PRESSURE: 80 MMHG | BODY MASS INDEX: 34.79 KG/M2 | TEMPERATURE: 97.8 F | RESPIRATION RATE: 18 BRPM | OXYGEN SATURATION: 97 %

## 2020-02-19 DIAGNOSIS — D32.9 BENIGN MENINGIOMA (HCC): Primary | ICD-10-CM

## 2020-02-19 PROCEDURE — 99211 OFF/OP EST MAY X REQ PHY/QHP: CPT | Performed by: STUDENT IN AN ORGANIZED HEALTH CARE EDUCATION/TRAINING PROGRAM

## 2020-02-19 PROCEDURE — G0463 HOSPITAL OUTPT CLINIC VISIT: HCPCS | Performed by: STUDENT IN AN ORGANIZED HEALTH CARE EDUCATION/TRAINING PROGRAM
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
## 2020-02-21 ENCOUNTER — TELEPHONE (OUTPATIENT)
Dept: FAMILY MEDICINE CLINIC | Facility: CLINIC | Age: 60
End: 2020-02-21

## 2020-02-21 DIAGNOSIS — R30.0 DYSURIA: Primary | ICD-10-CM

## 2020-02-21 RX ORDER — NITROFURANTOIN 25; 75 MG/1; MG/1
100 CAPSULE ORAL 2 TIMES DAILY
Qty: 10 CAPSULE | Refills: 0 | Status: SHIPPED | OUTPATIENT
Start: 2020-02-21 | End: 2020-02-26

## 2020-02-21 NOTE — TELEPHONE ENCOUNTER
Pt states that she has a low grade fever, but no chills or back pain  She also states that she is seeing some sediment in her urine  Please advise  Thank you

## 2020-02-21 NOTE — TELEPHONE ENCOUNTER
Please call pt and ask if she has any fever, chills or upper back pain  If so, she should be seen here or at urgent care  If she is only having urinary frequency and burning, I will send in a prescription for Macrobid, but she should follow-up if not better by early next week

## 2020-02-21 NOTE — TELEPHONE ENCOUNTER
Patient called states she feels like she is starting to get a UTI  Patient wants to know if something can be called into the [facility] in [facility] so that she doesn't have to suffer through the weekend  Please advise

## 2020-03-03 ENCOUNTER — TELEPHONE (OUTPATIENT)
Dept: OBGYN CLINIC | Facility: MEDICAL CENTER | Age: 60
End: 2020-03-03

## 2020-04-16 ENCOUNTER — TELEMEDICINE (OUTPATIENT)
Dept: FAMILY MEDICINE CLINIC | Facility: CLINIC | Age: 60
End: 2020-04-16
Payer: COMMERCIAL

## 2020-04-16 VITALS — RESPIRATION RATE: 16 BRPM | TEMPERATURE: 97.1 F

## 2020-04-16 DIAGNOSIS — R53.83 FATIGUE, UNSPECIFIED TYPE: ICD-10-CM

## 2020-04-16 DIAGNOSIS — E03.9 HYPOTHYROIDISM, UNSPECIFIED TYPE: Primary | ICD-10-CM

## 2020-04-16 PROCEDURE — 99214 OFFICE O/P EST MOD 30 MIN: CPT | Performed by: FAMILY MEDICINE

## 2020-04-17 ENCOUNTER — TELEPHONE (OUTPATIENT)
Dept: FAMILY MEDICINE CLINIC | Facility: CLINIC | Age: 60
End: 2020-04-17

## 2020-05-17 DIAGNOSIS — I10 ESSENTIAL HYPERTENSION: ICD-10-CM

## 2020-05-18 RX ORDER — HYDROCHLOROTHIAZIDE 12.5 MG/1
TABLET ORAL
Qty: 90 TABLET | Refills: 3 | Status: SHIPPED | OUTPATIENT
Start: 2020-05-18 | End: 2021-05-14

## 2020-05-18 RX ORDER — LOSARTAN POTASSIUM 50 MG/1
TABLET ORAL
Qty: 90 TABLET | Refills: 3 | Status: SHIPPED | OUTPATIENT
Start: 2020-05-18 | End: 2021-05-14

## 2020-05-19 DIAGNOSIS — G43.909 MIGRAINE WITHOUT STATUS MIGRAINOSUS, NOT INTRACTABLE, UNSPECIFIED MIGRAINE TYPE: ICD-10-CM

## 2020-05-19 RX ORDER — RIZATRIPTAN BENZOATE 10 MG/1
TABLET, ORALLY DISINTEGRATING ORAL
Qty: 9 TABLET | Refills: 0 | Status: SHIPPED | OUTPATIENT
Start: 2020-05-19 | End: 2020-09-08 | Stop reason: SDUPTHER

## 2020-08-26 ENCOUNTER — TELEPHONE (OUTPATIENT)
Dept: FAMILY MEDICINE CLINIC | Facility: CLINIC | Age: 60
End: 2020-08-26
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
## 2020-09-08 DIAGNOSIS — G43.909 MIGRAINE WITHOUT STATUS MIGRAINOSUS, NOT INTRACTABLE, UNSPECIFIED MIGRAINE TYPE: ICD-10-CM

## 2020-09-09 RX ORDER — RIZATRIPTAN BENZOATE 10 MG/1
TABLET, ORALLY DISINTEGRATING ORAL
Qty: 9 TABLET | Refills: 0 | Status: SHIPPED | OUTPATIENT
Start: 2020-09-09 | End: 2020-11-11 | Stop reason: SDUPTHER

## 2020-10-21 ENCOUNTER — OFFICE VISIT (OUTPATIENT)
Dept: FAMILY MEDICINE CLINIC | Facility: CLINIC | Age: 60
End: 2020-10-21
Payer: COMMERCIAL

## 2020-10-21 DIAGNOSIS — M79.672 LEFT FOOT PAIN: ICD-10-CM

## 2020-10-21 DIAGNOSIS — Z12.31 SCREENING MAMMOGRAM, ENCOUNTER FOR: ICD-10-CM

## 2020-10-21 DIAGNOSIS — N39.0 URINARY TRACT INFECTION WITHOUT HEMATURIA, SITE UNSPECIFIED: Primary | ICD-10-CM

## 2020-10-21 DIAGNOSIS — Z23 NEED FOR INFLUENZA VACCINATION: ICD-10-CM

## 2020-10-21 PROCEDURE — 90682 RIV4 VACC RECOMBINANT DNA IM: CPT

## 2020-10-21 PROCEDURE — 99214 OFFICE O/P EST MOD 30 MIN: CPT | Performed by: FAMILY MEDICINE

## 2020-10-21 PROCEDURE — 87086 URINE CULTURE/COLONY COUNT: CPT | Performed by: FAMILY MEDICINE

## 2020-10-21 PROCEDURE — 1036F TOBACCO NON-USER: CPT | Performed by: FAMILY MEDICINE

## 2020-10-21 PROCEDURE — 90471 IMMUNIZATION ADMIN: CPT

## 2020-10-21 PROCEDURE — 3725F SCREEN DEPRESSION PERFORMED: CPT | Performed by: FAMILY MEDICINE

## 2020-10-21 RX ORDER — CIPROFLOXACIN 500 MG/1
500 TABLET, FILM COATED ORAL EVERY 12 HOURS SCHEDULED
Qty: 10 TABLET | Refills: 0 | Status: SHIPPED | OUTPATIENT
Start: 2020-10-21 | End: 2020-10-26

## 2020-10-22 LAB — BACTERIA UR CULT: NORMAL

## 2020-10-27 ENCOUNTER — APPOINTMENT (OUTPATIENT)
Dept: RADIOLOGY | Facility: MEDICAL CENTER | Age: 60
End: 2020-10-27
Payer: COMMERCIAL

## 2020-10-27 DIAGNOSIS — M79.672 LEFT FOOT PAIN: ICD-10-CM

## 2020-10-27 PROCEDURE — 73630 X-RAY EXAM OF FOOT: CPT

## 2020-11-04 ENCOUNTER — APPOINTMENT (EMERGENCY)
Dept: RADIOLOGY | Facility: HOSPITAL | Age: 60
DRG: 603 | End: 2020-11-04
Payer: COMMERCIAL

## 2020-11-04 ENCOUNTER — HOSPITAL ENCOUNTER (INPATIENT)
Facility: HOSPITAL | Age: 60
LOS: 4 days | Discharge: HOME | DRG: 603 | End: 2020-11-08
Attending: EMERGENCY MEDICINE | Admitting: INTERNAL MEDICINE
Payer: COMMERCIAL

## 2020-11-04 DIAGNOSIS — L03.114 CELLULITIS OF LEFT HAND: ICD-10-CM
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
## 2020-11-04 DIAGNOSIS — S61.452A DOG BITE, HAND, LEFT, INITIAL ENCOUNTER: Primary | ICD-10-CM

## 2020-11-04 PROBLEM — I89.1 LYMPHANGITIS: Status: ACTIVE | Noted: 2020-11-04

## 2020-11-04 PROBLEM — E87.1 HYPONATREMIA: Status: ACTIVE | Noted: 2020-11-04

## 2020-11-04 PROBLEM — D72.829 LEUKOCYTOSIS: Status: ACTIVE | Noted: 2020-11-04

## 2020-11-04 LAB
ANION GAP SERPL CALCULATED.3IONS-SCNC: 5 MMOL/L (ref 4–13)
ANION GAP SERPL CALCULATED.3IONS-SCNC: 7 MMOL/L (ref 4–13)
BASOPHILS # BLD AUTO: 0.03 THOUSANDS/ΜL (ref 0–0.1)
BASOPHILS # BLD AUTO: 0.04 THOUSANDS/ΜL (ref 0–0.1)
BASOPHILS NFR BLD AUTO: 0 % (ref 0–1)
BASOPHILS NFR BLD AUTO: 0 % (ref 0–1)
BUN SERPL-MCNC: 15 MG/DL (ref 5–25)
BUN SERPL-MCNC: 18 MG/DL (ref 5–25)
CALCIUM SERPL-MCNC: 9.5 MG/DL (ref 8.3–10.1)
CALCIUM SERPL-MCNC: 9.6 MG/DL (ref 8.3–10.1)
CHLORIDE SERPL-SCNC: 100 MMOL/L (ref 100–108)
CHLORIDE SERPL-SCNC: 96 MMOL/L (ref 100–108)
CO2 SERPL-SCNC: 29 MMOL/L (ref 21–32)
CO2 SERPL-SCNC: 32 MMOL/L (ref 21–32)
CREAT SERPL-MCNC: 0.85 MG/DL (ref 0.6–1.3)
CREAT SERPL-MCNC: 1.02 MG/DL (ref 0.6–1.3)
EOSINOPHIL # BLD AUTO: 0.06 THOUSAND/ΜL (ref 0–0.61)
EOSINOPHIL # BLD AUTO: 0.07 THOUSAND/ΜL (ref 0–0.61)
EOSINOPHIL NFR BLD AUTO: 1 % (ref 0–6)
EOSINOPHIL NFR BLD AUTO: 1 % (ref 0–6)
ERYTHROCYTE [DISTWIDTH] IN BLOOD BY AUTOMATED COUNT: 12.5 % (ref 11.6–15.1)
ERYTHROCYTE [DISTWIDTH] IN BLOOD BY AUTOMATED COUNT: 12.5 % (ref 11.6–15.1)
GFR SERPL CREATININE-BSD FRML MDRD: 61 ML/MIN/1.73SQ M
GFR SERPL CREATININE-BSD FRML MDRD: 76 ML/MIN/1.73SQ M
GLUCOSE SERPL-MCNC: 103 MG/DL (ref 65–140)
GLUCOSE SERPL-MCNC: 108 MG/DL (ref 65–140)
HCT VFR BLD AUTO: 35.2 % (ref 34.8–46.1)
HCT VFR BLD AUTO: 36.2 % (ref 34.8–46.1)
HGB BLD-MCNC: 11.8 G/DL (ref 11.5–15.4)
HGB BLD-MCNC: 12 G/DL (ref 11.5–15.4)
IMM GRANULOCYTES # BLD AUTO: 0.03 THOUSAND/UL (ref 0–0.2)
IMM GRANULOCYTES # BLD AUTO: 0.05 THOUSAND/UL (ref 0–0.2)
IMM GRANULOCYTES NFR BLD AUTO: 0 % (ref 0–2)
IMM GRANULOCYTES NFR BLD AUTO: 0 % (ref 0–2)
LYMPHOCYTES # BLD AUTO: 1.41 THOUSANDS/ΜL (ref 0.6–4.47)
LYMPHOCYTES # BLD AUTO: 1.7 THOUSANDS/ΜL (ref 0.6–4.47)
LYMPHOCYTES NFR BLD AUTO: 12 % (ref 14–44)
LYMPHOCYTES NFR BLD AUTO: 19 % (ref 14–44)
MCH RBC QN AUTO: 28.5 PG (ref 26.8–34.3)
MCH RBC QN AUTO: 29.1 PG (ref 26.8–34.3)
MCHC RBC AUTO-ENTMCNC: 33.1 G/DL (ref 31.4–37.4)
MCHC RBC AUTO-ENTMCNC: 33.5 G/DL (ref 31.4–37.4)
MCV RBC AUTO: 86 FL (ref 82–98)
MCV RBC AUTO: 87 FL (ref 82–98)
MONOCYTES # BLD AUTO: 0.66 THOUSAND/ΜL (ref 0.17–1.22)
MONOCYTES # BLD AUTO: 0.82 THOUSAND/ΜL (ref 0.17–1.22)
MONOCYTES NFR BLD AUTO: 7 % (ref 4–12)
MONOCYTES NFR BLD AUTO: 7 % (ref 4–12)
NEUTROPHILS # BLD AUTO: 6.41 THOUSANDS/ΜL (ref 1.85–7.62)
NEUTROPHILS # BLD AUTO: 9.06 THOUSANDS/ΜL (ref 1.85–7.62)
NEUTS SEG NFR BLD AUTO: 73 % (ref 43–75)
NEUTS SEG NFR BLD AUTO: 80 % (ref 43–75)
NRBC BLD AUTO-RTO: 0 /100 WBCS
NRBC BLD AUTO-RTO: 0 /100 WBCS
PLATELET # BLD AUTO: 214 THOUSANDS/UL (ref 149–390)
PLATELET # BLD AUTO: 221 THOUSANDS/UL (ref 149–390)
PMV BLD AUTO: 10.7 FL (ref 8.9–12.7)
PMV BLD AUTO: 11 FL (ref 8.9–12.7)
POTASSIUM SERPL-SCNC: 3.5 MMOL/L (ref 3.5–5.3)
POTASSIUM SERPL-SCNC: 3.7 MMOL/L (ref 3.5–5.3)
RBC # BLD AUTO: 4.06 MILLION/UL (ref 3.81–5.12)
RBC # BLD AUTO: 4.21 MILLION/UL (ref 3.81–5.12)
SODIUM SERPL-SCNC: 133 MMOL/L (ref 136–145)
SODIUM SERPL-SCNC: 136 MMOL/L (ref 136–145)
WBC # BLD AUTO: 11.44 THOUSAND/UL (ref 4.31–10.16)
WBC # BLD AUTO: 8.9 THOUSAND/UL (ref 4.31–10.16)

## 2020-11-04 PROCEDURE — 96365 THER/PROPH/DIAG IV INF INIT: CPT

## 2020-11-04 PROCEDURE — 80048 BASIC METABOLIC PNL TOTAL CA: CPT | Performed by: PHYSICIAN ASSISTANT

## 2020-11-04 PROCEDURE — 80048 BASIC METABOLIC PNL TOTAL CA: CPT | Performed by: EMERGENCY MEDICINE

## 2020-11-04 PROCEDURE — 99254 IP/OBS CNSLTJ NEW/EST MOD 60: CPT | Performed by: ORTHOPAEDIC SURGERY

## 2020-11-04 PROCEDURE — 99284 EMERGENCY DEPT VISIT MOD MDM: CPT

## 2020-11-04 PROCEDURE — 99285 EMERGENCY DEPT VISIT HI MDM: CPT | Performed by: EMERGENCY MEDICINE

## 2020-11-04 PROCEDURE — 36415 COLL VENOUS BLD VENIPUNCTURE: CPT | Performed by: EMERGENCY MEDICINE

## 2020-11-04 PROCEDURE — 85025 COMPLETE CBC W/AUTO DIFF WBC: CPT | Performed by: EMERGENCY MEDICINE

## 2020-11-04 PROCEDURE — 90715 TDAP VACCINE 7 YRS/> IM: CPT | Performed by: EMERGENCY MEDICINE

## 2020-11-04 PROCEDURE — 73130 X-RAY EXAM OF HAND: CPT

## 2020-11-04 PROCEDURE — 99223 1ST HOSP IP/OBS HIGH 75: CPT | Performed by: INTERNAL MEDICINE

## 2020-11-04 PROCEDURE — 85025 COMPLETE CBC W/AUTO DIFF WBC: CPT | Performed by: PHYSICIAN ASSISTANT

## 2020-11-04 RX ORDER — KETOROLAC TROMETHAMINE 30 MG/ML
15 INJECTION, SOLUTION INTRAMUSCULAR; INTRAVENOUS EVERY 6 HOURS SCHEDULED
Status: DISCONTINUED | OUTPATIENT
Start: 2020-11-04 | End: 2020-11-08 | Stop reason: HOSPADM

## 2020-11-04 RX ORDER — TRAMADOL HYDROCHLORIDE 50 MG/1
50 TABLET ORAL EVERY 6 HOURS PRN
Status: DISCONTINUED | OUTPATIENT
Start: 2020-11-04 | End: 2020-11-08 | Stop reason: HOSPADM

## 2020-11-04 RX ORDER — LOSARTAN POTASSIUM 50 MG/1
50 TABLET ORAL DAILY
Status: DISCONTINUED | OUTPATIENT
Start: 2020-11-04 | End: 2020-11-08 | Stop reason: HOSPADM

## 2020-11-04 RX ORDER — ACETAMINOPHEN 325 MG/1
650 TABLET ORAL EVERY 6 HOURS PRN
Status: DISCONTINUED | OUTPATIENT
Start: 2020-11-04 | End: 2020-11-08 | Stop reason: HOSPADM

## 2020-11-04 RX ORDER — CLINDAMYCIN PHOSPHATE 600 MG/50ML
600 INJECTION INTRAVENOUS EVERY 6 HOURS
Status: DISCONTINUED | OUTPATIENT
Start: 2020-11-04 | End: 2020-11-08 | Stop reason: HOSPADM

## 2020-11-04 RX ORDER — LEVOTHYROXINE SODIUM 0.05 MG/1
50 TABLET ORAL
Status: DISCONTINUED | OUTPATIENT
Start: 2020-11-04 | End: 2020-11-08 | Stop reason: HOSPADM

## 2020-11-04 RX ORDER — ONDANSETRON 2 MG/ML
4 INJECTION INTRAMUSCULAR; INTRAVENOUS EVERY 6 HOURS PRN
Status: DISCONTINUED | OUTPATIENT
Start: 2020-11-04 | End: 2020-11-08 | Stop reason: HOSPADM

## 2020-11-04 RX ORDER — ACETAMINOPHEN 325 MG/1
650 TABLET ORAL ONCE AS NEEDED
Status: CANCELLED | OUTPATIENT
Start: 2020-11-04

## 2020-11-04 RX ORDER — HEPARIN SODIUM 5000 [USP'U]/ML
5000 INJECTION, SOLUTION INTRAVENOUS; SUBCUTANEOUS EVERY 8 HOURS SCHEDULED
Status: DISCONTINUED | OUTPATIENT
Start: 2020-11-04 | End: 2020-11-08 | Stop reason: HOSPADM

## 2020-11-04 RX ORDER — SODIUM CHLORIDE 9 MG/ML
75 INJECTION, SOLUTION INTRAVENOUS CONTINUOUS
Status: CANCELLED | OUTPATIENT
Start: 2020-11-04

## 2020-11-04 RX ADMIN — METRONIDAZOLE 500 MG: 500 INJECTION, SOLUTION INTRAVENOUS at 02:40

## 2020-11-04 RX ADMIN — KETOROLAC TROMETHAMINE 15 MG: 30 INJECTION, SOLUTION INTRAMUSCULAR; INTRAVENOUS at 19:43

## 2020-11-04 RX ADMIN — TRAMADOL HYDROCHLORIDE 50 MG: 50 TABLET, FILM COATED ORAL at 12:02

## 2020-11-04 RX ADMIN — TRAMADOL HYDROCHLORIDE 50 MG: 50 TABLET, FILM COATED ORAL at 06:31

## 2020-11-04 RX ADMIN — CLINDAMYCIN IN 5 PERCENT DEXTROSE 600 MG: 12 INJECTION, SOLUTION INTRAVENOUS at 19:42

## 2020-11-04 RX ADMIN — ACETAMINOPHEN 650 MG: 325 TABLET ORAL at 04:50

## 2020-11-04 RX ADMIN — HEPARIN SODIUM 5000 UNITS: 5000 INJECTION INTRAVENOUS; SUBCUTANEOUS at 06:31

## 2020-11-04 RX ADMIN — LOSARTAN POTASSIUM 50 MG: 50 TABLET, FILM COATED ORAL at 08:59

## 2020-11-04 RX ADMIN — CEFTRIAXONE SODIUM 1000 MG: 10 INJECTION, POWDER, FOR SOLUTION INTRAVENOUS at 01:35

## 2020-11-04 RX ADMIN — TETANUS TOXOID, REDUCED DIPHTHERIA TOXOID AND ACELLULAR PERTUSSIS VACCINE, ADSORBED 0.5 ML: 5; 2.5; 8; 8; 2.5 SUSPENSION INTRAMUSCULAR at 02:41

## 2020-11-04 RX ADMIN — CLINDAMYCIN IN 5 PERCENT DEXTROSE 600 MG: 12 INJECTION, SOLUTION INTRAVENOUS at 12:22

## 2020-11-04 RX ADMIN — HEPARIN SODIUM 5000 UNITS: 5000 INJECTION INTRAVENOUS; SUBCUTANEOUS at 14:05

## 2020-11-04 RX ADMIN — LEVOTHYROXINE SODIUM 50 MCG: 50 TABLET ORAL at 06:31

## 2020-11-04 RX ADMIN — CLINDAMYCIN IN 5 PERCENT DEXTROSE 600 MG: 12 INJECTION, SOLUTION INTRAVENOUS at 06:31

## 2020-11-04 RX ADMIN — HEPARIN SODIUM 5000 UNITS: 5000 INJECTION INTRAVENOUS; SUBCUTANEOUS at 21:52

## 2020-11-05 LAB
ANION GAP SERPL CALCULATED.3IONS-SCNC: 6 MMOL/L (ref 4–13)
BASOPHILS # BLD AUTO: 0.03 THOUSANDS/ΜL (ref 0–0.1)
BASOPHILS NFR BLD AUTO: 1 % (ref 0–1)
BUN SERPL-MCNC: 16 MG/DL (ref 5–25)
CALCIUM SERPL-MCNC: 8.8 MG/DL (ref 8.3–10.1)
CHLORIDE SERPL-SCNC: 100 MMOL/L (ref 100–108)
CO2 SERPL-SCNC: 28 MMOL/L (ref 21–32)
CREAT SERPL-MCNC: 0.82 MG/DL (ref 0.6–1.3)
EOSINOPHIL # BLD AUTO: 0.19 THOUSAND/ΜL (ref 0–0.61)
EOSINOPHIL NFR BLD AUTO: 4 % (ref 0–6)
ERYTHROCYTE [DISTWIDTH] IN BLOOD BY AUTOMATED COUNT: 12.7 % (ref 11.6–15.1)
GFR SERPL CREATININE-BSD FRML MDRD: 79 ML/MIN/1.73SQ M
GLUCOSE SERPL-MCNC: 84 MG/DL (ref 65–140)
HCT VFR BLD AUTO: 33.5 % (ref 34.8–46.1)
HGB BLD-MCNC: 10.9 G/DL (ref 11.5–15.4)
IMM GRANULOCYTES # BLD AUTO: 0.01 THOUSAND/UL (ref 0–0.2)
IMM GRANULOCYTES NFR BLD AUTO: 0 % (ref 0–2)
LYMPHOCYTES # BLD AUTO: 1.51 THOUSANDS/ΜL (ref 0.6–4.47)
LYMPHOCYTES NFR BLD AUTO: 30 % (ref 14–44)
MCH RBC QN AUTO: 28.3 PG (ref 26.8–34.3)
MCHC RBC AUTO-ENTMCNC: 32.5 G/DL (ref 31.4–37.4)
MCV RBC AUTO: 87 FL (ref 82–98)
MONOCYTES # BLD AUTO: 0.54 THOUSAND/ΜL (ref 0.17–1.22)
MONOCYTES NFR BLD AUTO: 11 % (ref 4–12)
NEUTROPHILS # BLD AUTO: 2.7 THOUSANDS/ΜL (ref 1.85–7.62)
NEUTS SEG NFR BLD AUTO: 54 % (ref 43–75)
NRBC BLD AUTO-RTO: 0 /100 WBCS
PLATELET # BLD AUTO: 195 THOUSANDS/UL (ref 149–390)
PMV BLD AUTO: 11.1 FL (ref 8.9–12.7)
POTASSIUM SERPL-SCNC: 3.6 MMOL/L (ref 3.5–5.3)
RBC # BLD AUTO: 3.85 MILLION/UL (ref 3.81–5.12)
SODIUM SERPL-SCNC: 134 MMOL/L (ref 136–145)
WBC # BLD AUTO: 4.98 THOUSAND/UL (ref 4.31–10.16)

## 2020-11-05 PROCEDURE — 99232 SBSQ HOSP IP/OBS MODERATE 35: CPT | Performed by: INTERNAL MEDICINE

## 2020-11-05 PROCEDURE — 80048 BASIC METABOLIC PNL TOTAL CA: CPT | Performed by: INTERNAL MEDICINE

## 2020-11-05 PROCEDURE — 99231 SBSQ HOSP IP/OBS SF/LOW 25: CPT | Performed by: PHYSICIAN ASSISTANT

## 2020-11-05 PROCEDURE — 85025 COMPLETE CBC W/AUTO DIFF WBC: CPT | Performed by: INTERNAL MEDICINE

## 2020-11-05 RX ADMIN — LEVOTHYROXINE SODIUM 50 MCG: 50 TABLET ORAL at 06:07

## 2020-11-05 RX ADMIN — KETOROLAC TROMETHAMINE 15 MG: 30 INJECTION, SOLUTION INTRAMUSCULAR; INTRAVENOUS at 06:08

## 2020-11-05 RX ADMIN — HEPARIN SODIUM 5000 UNITS: 5000 INJECTION INTRAVENOUS; SUBCUTANEOUS at 13:24

## 2020-11-05 RX ADMIN — KETOROLAC TROMETHAMINE 15 MG: 30 INJECTION, SOLUTION INTRAMUSCULAR; INTRAVENOUS at 18:08

## 2020-11-05 RX ADMIN — CLINDAMYCIN IN 5 PERCENT DEXTROSE 600 MG: 12 INJECTION, SOLUTION INTRAVENOUS at 13:24

## 2020-11-05 RX ADMIN — CLINDAMYCIN IN 5 PERCENT DEXTROSE 600 MG: 12 INJECTION, SOLUTION INTRAVENOUS at 18:08

## 2020-11-05 RX ADMIN — KETOROLAC TROMETHAMINE 15 MG: 30 INJECTION, SOLUTION INTRAMUSCULAR; INTRAVENOUS at 11:40

## 2020-11-05 RX ADMIN — CLINDAMYCIN IN 5 PERCENT DEXTROSE 600 MG: 12 INJECTION, SOLUTION INTRAVENOUS at 06:16

## 2020-11-05 RX ADMIN — CEFTRIAXONE SODIUM 2000 MG: 2 INJECTION, POWDER, FOR SOLUTION INTRAMUSCULAR; INTRAVENOUS at 09:46

## 2020-11-05 RX ADMIN — KETOROLAC TROMETHAMINE 15 MG: 30 INJECTION, SOLUTION INTRAMUSCULAR; INTRAVENOUS at 00:53

## 2020-11-05 RX ADMIN — CLINDAMYCIN IN 5 PERCENT DEXTROSE 600 MG: 12 INJECTION, SOLUTION INTRAVENOUS at 00:54

## 2020-11-05 RX ADMIN — LOSARTAN POTASSIUM 50 MG: 50 TABLET, FILM COATED ORAL at 09:46

## 2020-11-05 RX ADMIN — HEPARIN SODIUM 5000 UNITS: 5000 INJECTION INTRAVENOUS; SUBCUTANEOUS at 22:18

## 2020-11-05 RX ADMIN — HEPARIN SODIUM 5000 UNITS: 5000 INJECTION INTRAVENOUS; SUBCUTANEOUS at 06:07

## 2020-11-06 PROCEDURE — 99231 SBSQ HOSP IP/OBS SF/LOW 25: CPT | Performed by: ORTHOPAEDIC SURGERY

## 2020-11-06 PROCEDURE — 87185 SC STD ENZYME DETCJ PER NZM: CPT | Performed by: PHYSICIAN ASSISTANT

## 2020-11-06 PROCEDURE — 99232 SBSQ HOSP IP/OBS MODERATE 35: CPT | Performed by: INTERNAL MEDICINE

## 2020-11-06 PROCEDURE — 26011 DRAINAGE OF FINGER ABSCESS: CPT | Performed by: ORTHOPAEDIC SURGERY

## 2020-11-06 PROCEDURE — 87075 CULTR BACTERIA EXCEPT BLOOD: CPT | Performed by: PHYSICIAN ASSISTANT

## 2020-11-06 PROCEDURE — 87070 CULTURE OTHR SPECIMN AEROBIC: CPT | Performed by: PHYSICIAN ASSISTANT

## 2020-11-06 PROCEDURE — 87205 SMEAR GRAM STAIN: CPT | Performed by: PHYSICIAN ASSISTANT

## 2020-11-06 PROCEDURE — 0H9GXZZ DRAINAGE OF LEFT HAND SKIN, EXTERNAL APPROACH: ICD-10-PCS | Performed by: ORTHOPAEDIC SURGERY

## 2020-11-06 RX ORDER — LIDOCAINE HYDROCHLORIDE 10 MG/ML
10 INJECTION, SOLUTION EPIDURAL; INFILTRATION; INTRACAUDAL; PERINEURAL ONCE
Status: DISCONTINUED | OUTPATIENT
Start: 2020-11-06 | End: 2020-11-08 | Stop reason: HOSPADM

## 2020-11-06 RX ADMIN — HEPARIN SODIUM 5000 UNITS: 5000 INJECTION INTRAVENOUS; SUBCUTANEOUS at 13:06

## 2020-11-06 RX ADMIN — KETOROLAC TROMETHAMINE 15 MG: 30 INJECTION, SOLUTION INTRAMUSCULAR; INTRAVENOUS at 00:46

## 2020-11-06 RX ADMIN — CEFTRIAXONE SODIUM 2000 MG: 10 INJECTION, POWDER, FOR SOLUTION INTRAVENOUS at 10:14

## 2020-11-06 RX ADMIN — KETOROLAC TROMETHAMINE 15 MG: 30 INJECTION, SOLUTION INTRAMUSCULAR; INTRAVENOUS at 11:15

## 2020-11-06 RX ADMIN — HEPARIN SODIUM 5000 UNITS: 5000 INJECTION INTRAVENOUS; SUBCUTANEOUS at 06:24

## 2020-11-06 RX ADMIN — LOSARTAN POTASSIUM 50 MG: 50 TABLET, FILM COATED ORAL at 10:14

## 2020-11-06 RX ADMIN — LEVOTHYROXINE SODIUM 50 MCG: 50 TABLET ORAL at 06:24

## 2020-11-06 RX ADMIN — CLINDAMYCIN IN 5 PERCENT DEXTROSE 600 MG: 12 INJECTION, SOLUTION INTRAVENOUS at 06:36

## 2020-11-06 RX ADMIN — CLINDAMYCIN IN 5 PERCENT DEXTROSE 600 MG: 12 INJECTION, SOLUTION INTRAVENOUS at 00:48

## 2020-11-06 RX ADMIN — KETOROLAC TROMETHAMINE 15 MG: 30 INJECTION, SOLUTION INTRAMUSCULAR; INTRAVENOUS at 06:24

## 2020-11-06 RX ADMIN — CLINDAMYCIN IN 5 PERCENT DEXTROSE 600 MG: 12 INJECTION, SOLUTION INTRAVENOUS at 18:28

## 2020-11-06 RX ADMIN — CLINDAMYCIN IN 5 PERCENT DEXTROSE 600 MG: 12 INJECTION, SOLUTION INTRAVENOUS at 13:06

## 2020-11-06 RX ADMIN — HEPARIN SODIUM 5000 UNITS: 5000 INJECTION INTRAVENOUS; SUBCUTANEOUS at 21:24

## 2020-11-06 RX ADMIN — KETOROLAC TROMETHAMINE 15 MG: 30 INJECTION, SOLUTION INTRAMUSCULAR; INTRAVENOUS at 18:28

## 2020-11-07 LAB
BASOPHILS # BLD AUTO: 0.04 THOUSANDS/ΜL (ref 0–0.1)
BASOPHILS NFR BLD AUTO: 1 % (ref 0–1)
EOSINOPHIL # BLD AUTO: 0.24 THOUSAND/ΜL (ref 0–0.61)
EOSINOPHIL NFR BLD AUTO: 6 % (ref 0–6)
ERYTHROCYTE [DISTWIDTH] IN BLOOD BY AUTOMATED COUNT: 12.6 % (ref 11.6–15.1)
HCT VFR BLD AUTO: 33.9 % (ref 34.8–46.1)
HGB BLD-MCNC: 11 G/DL (ref 11.5–15.4)
IMM GRANULOCYTES # BLD AUTO: 0.01 THOUSAND/UL (ref 0–0.2)
IMM GRANULOCYTES NFR BLD AUTO: 0 % (ref 0–2)
LYMPHOCYTES # BLD AUTO: 1.28 THOUSANDS/ΜL (ref 0.6–4.47)
LYMPHOCYTES NFR BLD AUTO: 30 % (ref 14–44)
MCH RBC QN AUTO: 28.1 PG (ref 26.8–34.3)
MCHC RBC AUTO-ENTMCNC: 32.4 G/DL (ref 31.4–37.4)
MCV RBC AUTO: 87 FL (ref 82–98)
MONOCYTES # BLD AUTO: 0.4 THOUSAND/ΜL (ref 0.17–1.22)
MONOCYTES NFR BLD AUTO: 10 % (ref 4–12)
NEUTROPHILS # BLD AUTO: 2.25 THOUSANDS/ΜL (ref 1.85–7.62)
NEUTS SEG NFR BLD AUTO: 53 % (ref 43–75)
NRBC BLD AUTO-RTO: 0 /100 WBCS
PLATELET # BLD AUTO: 206 THOUSANDS/UL (ref 149–390)
PMV BLD AUTO: 10.7 FL (ref 8.9–12.7)
RBC # BLD AUTO: 3.91 MILLION/UL (ref 3.81–5.12)
WBC # BLD AUTO: 4.22 THOUSAND/UL (ref 4.31–10.16)

## 2020-11-07 PROCEDURE — 85025 COMPLETE CBC W/AUTO DIFF WBC: CPT | Performed by: PHYSICIAN ASSISTANT

## 2020-11-07 PROCEDURE — 99024 POSTOP FOLLOW-UP VISIT: CPT | Performed by: PHYSICIAN ASSISTANT

## 2020-11-07 PROCEDURE — 99232 SBSQ HOSP IP/OBS MODERATE 35: CPT | Performed by: INTERNAL MEDICINE

## 2020-11-07 RX ADMIN — KETOROLAC TROMETHAMINE 15 MG: 30 INJECTION, SOLUTION INTRAMUSCULAR; INTRAVENOUS at 12:01

## 2020-11-07 RX ADMIN — KETOROLAC TROMETHAMINE 15 MG: 30 INJECTION, SOLUTION INTRAMUSCULAR; INTRAVENOUS at 17:59

## 2020-11-07 RX ADMIN — LEVOTHYROXINE SODIUM 50 MCG: 50 TABLET ORAL at 05:12

## 2020-11-07 RX ADMIN — HEPARIN SODIUM 5000 UNITS: 5000 INJECTION INTRAVENOUS; SUBCUTANEOUS at 05:12

## 2020-11-07 RX ADMIN — CLINDAMYCIN IN 5 PERCENT DEXTROSE 600 MG: 12 INJECTION, SOLUTION INTRAVENOUS at 00:56

## 2020-11-07 RX ADMIN — HEPARIN SODIUM 5000 UNITS: 5000 INJECTION INTRAVENOUS; SUBCUTANEOUS at 21:13

## 2020-11-07 RX ADMIN — CLINDAMYCIN IN 5 PERCENT DEXTROSE 600 MG: 12 INJECTION, SOLUTION INTRAVENOUS at 07:10

## 2020-11-07 RX ADMIN — CEFTRIAXONE SODIUM 2000 MG: 10 INJECTION, POWDER, FOR SOLUTION INTRAVENOUS at 10:10

## 2020-11-07 RX ADMIN — LOSARTAN POTASSIUM 50 MG: 50 TABLET, FILM COATED ORAL at 09:18

## 2020-11-07 RX ADMIN — CLINDAMYCIN IN 5 PERCENT DEXTROSE 600 MG: 12 INJECTION, SOLUTION INTRAVENOUS at 13:17

## 2020-11-07 RX ADMIN — KETOROLAC TROMETHAMINE 15 MG: 30 INJECTION, SOLUTION INTRAMUSCULAR; INTRAVENOUS at 00:56

## 2020-11-07 RX ADMIN — CLINDAMYCIN IN 5 PERCENT DEXTROSE 600 MG: 12 INJECTION, SOLUTION INTRAVENOUS at 18:01

## 2020-11-07 RX ADMIN — HEPARIN SODIUM 5000 UNITS: 5000 INJECTION INTRAVENOUS; SUBCUTANEOUS at 13:17

## 2020-11-07 RX ADMIN — KETOROLAC TROMETHAMINE 15 MG: 30 INJECTION, SOLUTION INTRAMUSCULAR; INTRAVENOUS at 05:12

## 2020-11-08 VITALS
BODY MASS INDEX: 37.3 KG/M2 | TEMPERATURE: 98 F | HEART RATE: 68 BPM | SYSTOLIC BLOOD PRESSURE: 156 MMHG | RESPIRATION RATE: 18 BRPM | HEIGHT: 63 IN | OXYGEN SATURATION: 99 % | DIASTOLIC BLOOD PRESSURE: 100 MMHG | WEIGHT: 210.54 LBS

## 2020-11-08 PROCEDURE — 99239 HOSP IP/OBS DSCHRG MGMT >30: CPT | Performed by: INTERNAL MEDICINE

## 2020-11-08 PROCEDURE — 99024 POSTOP FOLLOW-UP VISIT: CPT | Performed by: PHYSICIAN ASSISTANT

## 2020-11-08 RX ORDER — CEFUROXIME AXETIL 500 MG/1
500 TABLET ORAL EVERY 12 HOURS SCHEDULED
Qty: 10 TABLET | Refills: 0 | Status: SHIPPED | OUTPATIENT
Start: 2020-11-08 | End: 2020-11-08 | Stop reason: SDUPTHER

## 2020-11-08 RX ORDER — KETOROLAC TROMETHAMINE 30 MG/ML
INJECTION, SOLUTION INTRAMUSCULAR; INTRAVENOUS
Status: COMPLETED
Start: 2020-11-08 | End: 2020-11-08

## 2020-11-08 RX ORDER — CLINDAMYCIN PHOSPHATE 600 MG/50ML
INJECTION INTRAVENOUS
Status: COMPLETED
Start: 2020-11-08 | End: 2020-11-08

## 2020-11-08 RX ORDER — CEFUROXIME AXETIL 500 MG/1
500 TABLET ORAL EVERY 12 HOURS SCHEDULED
Qty: 10 TABLET | Refills: 0 | Status: SHIPPED | OUTPATIENT
Start: 2020-11-08 | End: 2020-11-13

## 2020-11-08 RX ADMIN — KETOROLAC TROMETHAMINE 15 MG: 30 INJECTION, SOLUTION INTRAMUSCULAR; INTRAVENOUS at 05:34

## 2020-11-08 RX ADMIN — CLINDAMYCIN IN 5 PERCENT DEXTROSE 600 MG: 12 INJECTION, SOLUTION INTRAVENOUS at 12:39

## 2020-11-08 RX ADMIN — KETOROLAC TROMETHAMINE 15 MG: 30 INJECTION, SOLUTION INTRAMUSCULAR; INTRAVENOUS at 12:39

## 2020-11-08 RX ADMIN — CEFTRIAXONE SODIUM 2000 MG: 10 INJECTION, POWDER, FOR SOLUTION INTRAVENOUS at 09:49

## 2020-11-08 RX ADMIN — KETOROLAC TROMETHAMINE 15 MG: 30 INJECTION, SOLUTION INTRAMUSCULAR; INTRAVENOUS at 00:54

## 2020-11-08 RX ADMIN — KETOROLAC TROMETHAMINE 15 MG: 30 INJECTION, SOLUTION INTRAMUSCULAR at 00:54

## 2020-11-08 RX ADMIN — CLINDAMYCIN IN 5 PERCENT DEXTROSE 600 MG: 12 INJECTION, SOLUTION INTRAVENOUS at 01:00

## 2020-11-08 RX ADMIN — LOSARTAN POTASSIUM 50 MG: 50 TABLET, FILM COATED ORAL at 09:26

## 2020-11-08 RX ADMIN — CLINDAMYCIN IN 5 PERCENT DEXTROSE 600 MG: 12 INJECTION, SOLUTION INTRAVENOUS at 05:35

## 2020-11-08 RX ADMIN — HEPARIN SODIUM 5000 UNITS: 5000 INJECTION INTRAVENOUS; SUBCUTANEOUS at 05:34

## 2020-11-08 RX ADMIN — CLINDAMYCIN PHOSPHATE 600 MG: 12 INJECTION, SOLUTION INTRAMUSCULAR; INTRAVENOUS at 01:00

## 2020-11-08 RX ADMIN — LEVOTHYROXINE SODIUM 50 MCG: 50 TABLET ORAL at 05:34

## 2020-11-09 ENCOUNTER — TRANSITIONAL CARE MANAGEMENT (OUTPATIENT)
Dept: FAMILY MEDICINE CLINIC | Facility: CLINIC | Age: 60
End: 2020-11-09

## 2020-11-09 LAB
BACTERIA WND AEROBE CULT: NORMAL
GRAM STN SPEC: NORMAL
GRAM STN SPEC: NORMAL

## 2020-11-10 ENCOUNTER — TELEPHONE (OUTPATIENT)
Dept: OBGYN CLINIC | Facility: HOSPITAL | Age: 60
End: 2020-11-10

## 2020-11-11 ENCOUNTER — OFFICE VISIT (OUTPATIENT)
Dept: OBGYN CLINIC | Facility: MEDICAL CENTER | Age: 60
End: 2020-11-11

## 2020-11-11 ENCOUNTER — OFFICE VISIT (OUTPATIENT)
Dept: FAMILY MEDICINE CLINIC | Facility: CLINIC | Age: 60
End: 2020-11-11
Payer: COMMERCIAL

## 2020-11-11 VITALS
BODY MASS INDEX: 34.91 KG/M2 | OXYGEN SATURATION: 98 % | HEART RATE: 80 BPM | DIASTOLIC BLOOD PRESSURE: 90 MMHG | HEIGHT: 63 IN | SYSTOLIC BLOOD PRESSURE: 130 MMHG | TEMPERATURE: 96 F | WEIGHT: 197 LBS

## 2020-11-11 VITALS — TEMPERATURE: 98.1 F | WEIGHT: 197 LBS | BODY MASS INDEX: 34.91 KG/M2 | HEIGHT: 63 IN
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
## 2020-11-11 DIAGNOSIS — L03.114 CELLULITIS OF LEFT HAND: Primary | ICD-10-CM

## 2020-11-11 DIAGNOSIS — G43.909 MIGRAINE WITHOUT STATUS MIGRAINOSUS, NOT INTRACTABLE, UNSPECIFIED MIGRAINE TYPE: ICD-10-CM

## 2020-11-11 DIAGNOSIS — S61.452A CAT BITE OF HAND, LEFT, INITIAL ENCOUNTER: Primary | ICD-10-CM
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
## 2020-11-11 DIAGNOSIS — M54.50 ACUTE BILATERAL LOW BACK PAIN WITHOUT SCIATICA: ICD-10-CM

## 2020-11-11 DIAGNOSIS — S61.452A DOG BITE, HAND, LEFT, INITIAL ENCOUNTER: ICD-10-CM

## 2020-11-11 DIAGNOSIS — R39.9 UTI SYMPTOMS: ICD-10-CM

## 2020-11-11 LAB
BACTERIA SPEC ANAEROBE CULT: ABNORMAL
BACTERIA SPEC ANAEROBE CULT: ABNORMAL

## 2020-11-11 PROCEDURE — 3008F BODY MASS INDEX DOCD: CPT | Performed by: FAMILY MEDICINE

## 2020-11-11 PROCEDURE — 99496 TRANSJ CARE MGMT HIGH F2F 7D: CPT | Performed by: FAMILY MEDICINE

## 2020-11-11 PROCEDURE — 99024 POSTOP FOLLOW-UP VISIT: CPT | Performed by: ORTHOPAEDIC SURGERY

## 2020-11-11 RX ORDER — MELOXICAM 15 MG/1
TABLET ORAL
Qty: 90 TABLET | Refills: 0 | Status: SHIPPED | OUTPATIENT
Start: 2020-11-11 | End: 2021-06-16

## 2020-11-11 RX ORDER — RIZATRIPTAN BENZOATE 10 MG/1
TABLET, ORALLY DISINTEGRATING ORAL
Qty: 27 TABLET | Refills: 0 | Status: SHIPPED | OUTPATIENT
Start: 2020-11-11 | End: 2021-10-25 | Stop reason: SDUPTHER

## 2020-11-13 ENCOUNTER — TELEPHONE (OUTPATIENT)
Dept: OBGYN CLINIC | Facility: HOSPITAL | Age: 60
End: 2020-11-13

## 2020-11-16 ENCOUNTER — TELEPHONE (OUTPATIENT)
Dept: FAMILY MEDICINE CLINIC | Facility: CLINIC | Age: 60
End: 2020-11-16

## 2020-11-21 ENCOUNTER — OFFICE VISIT (OUTPATIENT)
Dept: URGENT CARE | Facility: CLINIC | Age: 60
End: 2020-11-21
Payer: COMMERCIAL

## 2020-11-21 VITALS
WEIGHT: 195 LBS | TEMPERATURE: 97.3 F | OXYGEN SATURATION: 99 % | HEIGHT: 63 IN | RESPIRATION RATE: 18 BRPM | BODY MASS INDEX: 34.55 KG/M2 | HEART RATE: 75 BPM

## 2020-11-21 DIAGNOSIS — Z20.822 CLOSE EXPOSURE TO COVID-19 VIRUS: Primary | ICD-10-CM

## 2020-11-21 PROCEDURE — 87637 SARSCOV2&INF A&B&RSV AMP PRB: CPT | Performed by: PHYSICIAN ASSISTANT

## 2020-11-21 PROCEDURE — 99213 OFFICE O/P EST LOW 20 MIN: CPT | Performed by: PHYSICIAN ASSISTANT

## 2020-11-25 LAB
FLUAV RNA NPH QL NAA+PROBE: NOT DETECTED
FLUBV RNA NPH QL NAA+PROBE: NOT DETECTED
RSV RNA NPH QL NAA+PROBE: NOT DETECTED
SARS-COV-2 RNA NPH QL NAA+PROBE: NOT DETECTED

## 2020-12-08 ENCOUNTER — OFFICE VISIT (OUTPATIENT)
Dept: OBGYN CLINIC | Facility: MEDICAL CENTER | Age: 60
End: 2020-12-08

## 2020-12-08 VITALS
HEIGHT: 63 IN | WEIGHT: 193 LBS | HEART RATE: 67 BPM | BODY MASS INDEX: 34.2 KG/M2 | SYSTOLIC BLOOD PRESSURE: 141 MMHG | DIASTOLIC BLOOD PRESSURE: 87 MMHG | TEMPERATURE: 96.3 F
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
## 2020-12-08 DIAGNOSIS — S61.452A CAT BITE OF HAND, LEFT, INITIAL ENCOUNTER: Primary | ICD-10-CM

## 2020-12-08 PROCEDURE — 99024 POSTOP FOLLOW-UP VISIT: CPT | Performed by: ORTHOPAEDIC SURGERY

## 2020-12-08 PROCEDURE — 3008F BODY MASS INDEX DOCD: CPT | Performed by: FAMILY MEDICINE

## 2020-12-28 ENCOUNTER — VBI (OUTPATIENT)
Dept: ADMINISTRATIVE | Facility: OTHER | Age: 60
End: 2020-12-28

## 2021-01-13 ENCOUNTER — TELEMEDICINE (OUTPATIENT)
Dept: FAMILY MEDICINE CLINIC | Facility: CLINIC | Age: 61
End: 2021-01-13
Payer: COMMERCIAL

## 2021-01-13 DIAGNOSIS — Z11.59 SCREENING FOR VIRAL DISEASE: Primary | ICD-10-CM

## 2021-01-13 DIAGNOSIS — Z11.59 SCREENING FOR VIRAL DISEASE: ICD-10-CM

## 2021-01-13 PROCEDURE — 1036F TOBACCO NON-USER: CPT | Performed by: FAMILY MEDICINE

## 2021-01-13 PROCEDURE — 99213 OFFICE O/P EST LOW 20 MIN: CPT | Performed by: FAMILY MEDICINE

## 2021-01-13 PROCEDURE — U0003 INFECTIOUS AGENT DETECTION BY NUCLEIC ACID (DNA OR RNA); SEVERE ACUTE RESPIRATORY SYNDROME CORONAVIRUS 2 (SARS-COV-2) (CORONAVIRUS DISEASE [COVID-19]), AMPLIFIED PROBE TECHNIQUE, MAKING USE OF HIGH THROUGHPUT TECHNOLOGIES AS DESCRIBED BY CMS-2020-01-R: HCPCS | Performed by: FAMILY MEDICINE

## 2021-01-13 PROCEDURE — U0005 INFEC AGEN DETEC AMPLI PROBE: HCPCS | Performed by: FAMILY MEDICINE
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
## 2021-01-14 LAB — SARS-COV-2 RNA SPEC QL NAA+PROBE: NOT DETECTED

## 2021-01-22 DIAGNOSIS — Z23 ENCOUNTER FOR IMMUNIZATION: ICD-10-CM

## 2021-02-05 ENCOUNTER — IMMUNIZATIONS (OUTPATIENT)
Dept: FAMILY MEDICINE CLINIC | Facility: HOSPITAL | Age: 61
End: 2021-02-05

## 2021-02-05 DIAGNOSIS — Z23 ENCOUNTER FOR IMMUNIZATION: Primary | ICD-10-CM

## 2021-02-05 PROCEDURE — 0011A SARS-COV-2 / COVID-19 MRNA VACCINE (MODERNA) 100 MCG: CPT

## 2021-02-05 PROCEDURE — 91301 SARS-COV-2 / COVID-19 MRNA VACCINE (MODERNA) 100 MCG: CPT

## 2021-02-12 DIAGNOSIS — E03.9 HYPOTHYROIDISM, UNSPECIFIED TYPE: ICD-10-CM

## 2021-02-12 RX ORDER — LEVOTHYROXINE SODIUM 0.05 MG/1
TABLET ORAL
Qty: 90 TABLET | Refills: 3 | Status: SHIPPED | OUTPATIENT
Start: 2021-02-12 | End: 2022-02-08

## 2021-02-16 ENCOUNTER — LAB (OUTPATIENT)
Dept: LAB | Facility: MEDICAL CENTER | Age: 61
End: 2021-02-16
Payer: COMMERCIAL

## 2021-02-16 DIAGNOSIS — D32.9 BENIGN MENINGIOMA (HCC): ICD-10-CM

## 2021-02-16 LAB
ALBUMIN SERPL BCP-MCNC: 4.1 G/DL (ref 3.5–5)
ALP SERPL-CCNC: 81 U/L (ref 46–116)
ALT SERPL W P-5'-P-CCNC: 33 U/L (ref 12–78)
ANION GAP SERPL CALCULATED.3IONS-SCNC: 2 MMOL/L (ref 4–13)
AST SERPL W P-5'-P-CCNC: 22 U/L (ref 5–45)
BASOPHILS # BLD AUTO: 0.04 THOUSANDS/ΜL (ref 0–0.1)
BASOPHILS NFR BLD AUTO: 1 % (ref 0–1)
BILIRUB SERPL-MCNC: 0.67 MG/DL (ref 0.2–1)
BUN SERPL-MCNC: 18 MG/DL (ref 5–25)
CALCIUM SERPL-MCNC: 9.8 MG/DL (ref 8.3–10.1)
CHLORIDE SERPL-SCNC: 105 MMOL/L (ref 100–108)
CO2 SERPL-SCNC: 32 MMOL/L (ref 21–32)
CREAT SERPL-MCNC: 0.92 MG/DL (ref 0.6–1.3)
EOSINOPHIL # BLD AUTO: 0.19 THOUSAND/ΜL (ref 0–0.61)
EOSINOPHIL NFR BLD AUTO: 4 % (ref 0–6)
ERYTHROCYTE [DISTWIDTH] IN BLOOD BY AUTOMATED COUNT: 12.6 % (ref 11.6–15.1)
GFR SERPL CREATININE-BSD FRML MDRD: 69 ML/MIN/1.73SQ M
GLUCOSE P FAST SERPL-MCNC: 92 MG/DL (ref 65–99)
HCT VFR BLD AUTO: 39.8 % (ref 34.8–46.1)
HGB BLD-MCNC: 12.9 G/DL (ref 11.5–15.4)
IMM GRANULOCYTES # BLD AUTO: 0.01 THOUSAND/UL (ref 0–0.2)
IMM GRANULOCYTES NFR BLD AUTO: 0 % (ref 0–2)
LYMPHOCYTES # BLD AUTO: 1.43 THOUSANDS/ΜL (ref 0.6–4.47)
LYMPHOCYTES NFR BLD AUTO: 30 % (ref 14–44)
MCH RBC QN AUTO: 28 PG (ref 26.8–34.3)
MCHC RBC AUTO-ENTMCNC: 32.4 G/DL (ref 31.4–37.4)
MCV RBC AUTO: 87 FL (ref 82–98)
MONOCYTES # BLD AUTO: 0.48 THOUSAND/ΜL (ref 0.17–1.22)
MONOCYTES NFR BLD AUTO: 10 % (ref 4–12)
NEUTROPHILS # BLD AUTO: 2.58 THOUSANDS/ΜL (ref 1.85–7.62)
NEUTS SEG NFR BLD AUTO: 55 % (ref 43–75)
NRBC BLD AUTO-RTO: 0 /100 WBCS
PLATELET # BLD AUTO: 211 THOUSANDS/UL (ref 149–390)
PMV BLD AUTO: 11.5 FL (ref 8.9–12.7)
POTASSIUM SERPL-SCNC: 4.8 MMOL/L (ref 3.5–5.3)
PROT SERPL-MCNC: 7.5 G/DL (ref 6.4–8.2)
RBC # BLD AUTO: 4.6 MILLION/UL (ref 3.81–5.12)
SODIUM SERPL-SCNC: 139 MMOL/L (ref 136–145)
WBC # BLD AUTO: 4.73 THOUSAND/UL (ref 4.31–10.16)

## 2021-02-16 PROCEDURE — 80053 COMPREHEN METABOLIC PANEL: CPT | Performed by: FAMILY MEDICINE

## 2021-02-16 PROCEDURE — 36415 COLL VENOUS BLD VENIPUNCTURE: CPT | Performed by: FAMILY MEDICINE

## 2021-02-16 PROCEDURE — 85025 COMPLETE CBC W/AUTO DIFF WBC: CPT | Performed by: FAMILY MEDICINE

## 2021-02-19 ENCOUNTER — HOSPITAL ENCOUNTER (OUTPATIENT)
Dept: MRI IMAGING | Facility: HOSPITAL | Age: 61
Discharge: HOME | End: 2021-02-19
Attending: STUDENT IN AN ORGANIZED HEALTH CARE EDUCATION/TRAINING PROGRAM
Payer: COMMERCIAL

## 2021-02-19 DIAGNOSIS — D32.9 BENIGN MENINGIOMA (HCC): ICD-10-CM

## 2021-02-19 PROCEDURE — A9585 GADOBUTROL INJECTION: HCPCS | Performed by: RADIOLOGY

## 2021-02-19 PROCEDURE — 70553 MRI BRAIN STEM W/O & W/DYE: CPT

## 2021-02-19 RX ADMIN — GADOBUTROL 9 ML: 604.72 INJECTION INTRAVENOUS at 10:18

## 2021-02-24 ENCOUNTER — TELEMEDICINE (OUTPATIENT)
Dept: RADIATION ONCOLOGY | Facility: HOSPITAL | Age: 61
End: 2021-02-24
Attending: RADIOLOGY
Payer: COMMERCIAL

## 2021-02-24 DIAGNOSIS — D32.9 BENIGN MENINGIOMA (HCC): Primary | ICD-10-CM

## 2021-02-24 PROCEDURE — 99211 OFF/OP EST MAY X REQ PHY/QHP: CPT | Performed by: RADIOLOGY

## 2021-02-24 PROCEDURE — G0463 HOSPITAL OUTPT CLINIC VISIT: HCPCS | Performed by: RADIOLOGY

## 2021-02-24 RX ORDER — THIAMINE HCL 100 MG
TABLET ORAL DAILY
COMMUNITY
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
## 2021-02-25 ENCOUNTER — HOSPITAL ENCOUNTER (EMERGENCY)
Facility: HOSPITAL | Age: 61
Discharge: HOME | End: 2021-02-25
Attending: EMERGENCY MEDICINE | Admitting: EMERGENCY MEDICINE
Payer: COMMERCIAL

## 2021-02-25 ENCOUNTER — APPOINTMENT (EMERGENCY)
Dept: RADIOLOGY | Facility: HOSPITAL | Age: 61
End: 2021-02-25
Payer: COMMERCIAL

## 2021-02-25 ENCOUNTER — TELEPHONE (OUTPATIENT)
Dept: OBGYN CLINIC | Facility: HOSPITAL | Age: 61
End: 2021-02-25

## 2021-02-25 VITALS
HEART RATE: 86 BPM | DIASTOLIC BLOOD PRESSURE: 99 MMHG | WEIGHT: 198.63 LBS | SYSTOLIC BLOOD PRESSURE: 207 MMHG | RESPIRATION RATE: 20 BRPM | TEMPERATURE: 98.2 F | OXYGEN SATURATION: 98 % | BODY MASS INDEX: 35.19 KG/M2

## 2021-02-25 DIAGNOSIS — S52.531A CLOSED COLLES' FRACTURE OF RIGHT RADIUS, INITIAL ENCOUNTER: ICD-10-CM

## 2021-02-25 DIAGNOSIS — M25.531 RIGHT WRIST PAIN: ICD-10-CM
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
## 2021-02-25 PROCEDURE — 99283 EMERGENCY DEPT VISIT LOW MDM: CPT

## 2021-02-25 PROCEDURE — 29125 APPL SHORT ARM SPLINT STATIC: CPT | Performed by: EMERGENCY MEDICINE

## 2021-02-25 PROCEDURE — 73110 X-RAY EXAM OF WRIST: CPT

## 2021-02-25 PROCEDURE — 99284 EMERGENCY DEPT VISIT MOD MDM: CPT | Performed by: EMERGENCY MEDICINE
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
## 2021-02-25 NOTE — TELEPHONE ENCOUNTER
Sent the following message for a forced appt request:    Please advise if the following patient can be forced onto the schedule:  Patient: Andreea Hunt  : 64  MRN: [id number]  Call back # [phone]  Reason for appointment: NP, R wrist fx, xray @  ED, [facility]BS, covid screened   Requested doctor/location: [facility], Dr Gabbie Orlando    Thank you,

## 2021-02-25 NOTE — DISCHARGE INSTRUCTIONS
Take Acetaminophen (Tylenol) and Ibuprofen (Motrin) as needed for pain    Please follow-up with outpatient Orthopedics

## 2021-02-25 NOTE — ED ATTENDING ATTESTATION
2/25/2021  IHelder DO, saw and evaluated the patient  I have discussed the patient with the resident/non-physician practitioner and agree with the resident's/non-physician practitioner's findings, Plan of Care, and MDM as documented in the resident's/non-physician practitioner's note, except where noted  All available labs and Radiology studies were reviewed  I was present for key portions of any procedure(s) performed by the resident/non-physician practitioner and I was immediately available to provide assistance  At this point I agree with the current assessment done in the Emergency Department  I have conducted an independent evaluation of this patient a history and physical is as follows:    Patient is a 14-year-old female that presents for fall tonight  States that she was walking on the stairs when she tripped and fell forward  Landed with her outstretched hands onto a wall  Patient states that her face at the wall but she did not lose consciousness or suffer major trauma from that  Patient states that she tried to break her fall with her right arm and developed immediate right wrist pain  Patient denies any other injuries  Patient does have localized tenderness to the distal radius of the right arm  She has full range of motion with good supination and pronation of the right elbow and full range of motion of the right shoulder  Cap refill is normal, she is neurovascularly intact and does have range of motion with her own power an with passive movement  No gross deformity noted  Patient is able to open and close her hand without a problem  X-ray shows a nondisplaced fracture of the distal right radius  Will place in a splint and have her follow-up with orthopedics  Advised ice and elevation along with Motrin and Tylenol for pain  Patient understands and agrees with plan of care    ED Course         Critical Care Time  Procedures

## 2021-03-02 ENCOUNTER — OFFICE VISIT (OUTPATIENT)
Dept: OBGYN CLINIC | Facility: MEDICAL CENTER | Age: 61
End: 2021-03-02
Payer: COMMERCIAL

## 2021-03-02 VITALS
WEIGHT: 195 LBS | TEMPERATURE: 98.2 F | BODY MASS INDEX: 34.55 KG/M2 | HEART RATE: 80 BPM | SYSTOLIC BLOOD PRESSURE: 137 MMHG | DIASTOLIC BLOOD PRESSURE: 82 MMHG | HEIGHT: 63 IN

## 2021-03-02 DIAGNOSIS — S52.531A CLOSED COLLES' FRACTURE OF RIGHT RADIUS, INITIAL ENCOUNTER: Primary | ICD-10-CM

## 2021-03-02 PROCEDURE — 99214 OFFICE O/P EST MOD 30 MIN: CPT | Performed by: ORTHOPAEDIC SURGERY

## 2021-03-02 PROCEDURE — 25600 CLTX DST RDL FX/EPHYS SEP WO: CPT | Performed by: ORTHOPAEDIC SURGERY

## 2021-03-02 PROCEDURE — 1036F TOBACCO NON-USER: CPT | Performed by: ORTHOPAEDIC SURGERY
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
## 2021-03-03 ENCOUNTER — IMMUNIZATIONS (OUTPATIENT)
Dept: FAMILY MEDICINE CLINIC | Facility: HOSPITAL | Age: 61
End: 2021-03-03

## 2021-03-03 DIAGNOSIS — Z23 ENCOUNTER FOR IMMUNIZATION: Primary | ICD-10-CM

## 2021-03-03 PROCEDURE — 0012A SARS-COV-2 / COVID-19 MRNA VACCINE (MODERNA) 100 MCG: CPT

## 2021-03-03 PROCEDURE — 91301 SARS-COV-2 / COVID-19 MRNA VACCINE (MODERNA) 100 MCG: CPT

## 2021-03-11 ENCOUNTER — OFFICE VISIT (OUTPATIENT)
Dept: OBGYN CLINIC | Facility: MEDICAL CENTER | Age: 61
End: 2021-03-11

## 2021-03-11 ENCOUNTER — APPOINTMENT (OUTPATIENT)
Dept: RADIOLOGY | Facility: MEDICAL CENTER | Age: 61
End: 2021-03-11
Payer: COMMERCIAL

## 2021-03-11 VITALS
DIASTOLIC BLOOD PRESSURE: 94 MMHG | WEIGHT: 195 LBS | HEIGHT: 63 IN | SYSTOLIC BLOOD PRESSURE: 160 MMHG | HEART RATE: 67 BPM | BODY MASS INDEX: 34.55 KG/M2 | TEMPERATURE: 98.1 F

## 2021-03-11 DIAGNOSIS — S52.531A CLOSED COLLES' FRACTURE OF RIGHT RADIUS, INITIAL ENCOUNTER: Primary | ICD-10-CM

## 2021-03-11 DIAGNOSIS — S52.531A CLOSED COLLES' FRACTURE OF RIGHT RADIUS, INITIAL ENCOUNTER: ICD-10-CM

## 2021-03-11 PROCEDURE — 99024 POSTOP FOLLOW-UP VISIT: CPT | Performed by: ORTHOPAEDIC SURGERY

## 2021-03-11 PROCEDURE — 73110 X-RAY EXAM OF WRIST: CPT

## 2021-03-11 NOTE — PROGRESS NOTES
History of the Present Illness     Carlin Arevalo is a 64 y o  female  presenting for follow-up and cast check regarding her right distal radius fracture and SL ligament injury  She is now two weeks from her initial injury  Patient was placed in a cast at her last visit, she states that this is fitting well and is not necessarily problematic for her  She does note some continued pain at the fracture site but denies any distal paresthesias  Physical Exam     /94   Pulse 67   Temp 98 1 °F (36 7 °C)   Ht 5' 3" (1 6 m)   Wt 88 5 kg (195 lb)   LMP  (LMP Unknown)   BMI 34 54 kg/m²     Right UE:  Cast is dry and intact  No rubbing or skin break down visible  Full motion of exposed digits  Full elbow flexion and extension   Brisk capillary refill noted  Sensation intact to light touch of exposed digits    Data Review       Imaging:  X-rays of the right wrist obtained in the office today 03/11/2021 Personally reviewed by me and demonstrate stable alignment of distal radius fracture however with significant widening at the scapholunate    Assessment and Plan     62-year-old female currently being treated non operatively for a right distal radius fracture and likely SL injury  X-rays were reviewed with the patient today which demonstrate stable alignment of her fracture  We discussed continuing non-operative treatment for this at this time  We will let this fracture heal and assess for any functional limitations pertaining to the SL injury if and when these would arise  We did briefly discuss the role of surgical intervention for the SL ligament in the future would likely be best done in the form of proximal row carpectomy  This would be a shorter recovery overall for her although she would lose a bit of  strength as well as wrist flexion extension  Patient's cast is fitting well today therefore she will continue in this for two additional weeks    At that time she will follow up for repeat exam repeat x-ray        Follow Up: 2 weeks    To Do Next Visit: repeat xray in cast    PROCEDURES PERFORMED:    No Procedures performed today    Scribe Attestation    I,:  Padmini Zuniga MA am acting as a scribe while in the presence of the attending physician :       I,:  Donovan Rice MD personally performed the services described in this documentation    as scribed in my presence :

## 2021-03-23 ENCOUNTER — TELEPHONE (OUTPATIENT)
Dept: OBGYN CLINIC | Facility: HOSPITAL | Age: 61
End: 2021-03-23

## 2021-03-23 NOTE — TELEPHONE ENCOUNTER
Can we just please verify that no issues with the cast that could be causing the pain? Finger swelling, etc  As long as no emergent symptoms, suggest to make sure she is elevating and refraining from heavy lifting with the wrist  Make sure to be moving the fingers to prevent stiffness  Should avoid activities which cause wrist pain  If issues with cast, we could see if we can get her in earlier

## 2021-03-23 NOTE — TELEPHONE ENCOUNTER
Patient is calling to let Dr Marjorie Hurt know that her wrist has started hurting on Friday  She does not recall doing anything specific to cause injury  Patient is scheduled for a follow up on Thursday 03-25-21  Please advise       [facility] [phone]

## 2021-03-23 NOTE — TELEPHONE ENCOUNTER
I spoke to patient and cap refill is WNL  Cast does not feel tight and there is not any complaint of swelling  Patient used her hand to pull up her pants  Advised to try to avoid the use of the hand and let things calm down  In the meantime she is taking advil 600mg TID PRN  Advised that she can alternate with tylenol 100mg TID  And elevation above the heart   If any worsening of symptoms, call office  Verbalized understanding

## 2021-03-25 ENCOUNTER — APPOINTMENT (OUTPATIENT)
Dept: RADIOLOGY | Facility: MEDICAL CENTER | Age: 61
End: 2021-03-25
Payer: COMMERCIAL

## 2021-03-25 ENCOUNTER — OFFICE VISIT (OUTPATIENT)
Dept: OBGYN CLINIC | Facility: MEDICAL CENTER | Age: 61
End: 2021-03-25

## 2021-03-25 VITALS
DIASTOLIC BLOOD PRESSURE: 89 MMHG | SYSTOLIC BLOOD PRESSURE: 155 MMHG | HEART RATE: 66 BPM | WEIGHT: 195 LBS | BODY MASS INDEX: 34.55 KG/M2 | HEIGHT: 63 IN

## 2021-03-25 DIAGNOSIS — S52.531A CLOSED COLLES' FRACTURE OF RIGHT RADIUS, INITIAL ENCOUNTER: ICD-10-CM

## 2021-03-25 DIAGNOSIS — S63.8X1D TEAR OF RIGHT SCAPHOLUNATE LIGAMENT, SUBSEQUENT ENCOUNTER: ICD-10-CM

## 2021-03-25 DIAGNOSIS — S52.531D CLOSED COLLES' FRACTURE OF RIGHT RADIUS WITH ROUTINE HEALING, SUBSEQUENT ENCOUNTER: Primary | ICD-10-CM

## 2021-03-25 PROCEDURE — 99024 POSTOP FOLLOW-UP VISIT: CPT | Performed by: ORTHOPAEDIC SURGERY

## 2021-03-25 PROCEDURE — 73110 X-RAY EXAM OF WRIST: CPT

## 2021-03-25 NOTE — PROGRESS NOTES
History of the Present Illness     Yumi Shin is a 64 y o  female presenting for follow-up regarding her right distal radius fracture and SL ligament injury  She is now four weeks from her injury and has been in a cast since her last visit  She does note a mild increase in pain over the fracture site  She denies any numbness or tingling  She denies any new injury  She is taking ibuprofen for pain  Physical Exam     /89   Pulse 66   Ht 5' 3" (1 6 m)   Wt 88 5 kg (195 lb)   LMP  (LMP Unknown)   BMI 34 54 kg/m²     Right wrist:  Cast is dry and intact  No rubbing or skin break down visible  Full motion of exposed digits  Full elbow flexion and extension   EPL intact, mild pain with resistance   Brisk capillary refill noted  Sensation intact to light touch of exposed digits    Data Review       Imaging:  Right wrist x-rays obtained in the office today 03/25/2021 personally viewed by me and demonstrate interval healing with stable alignment of distal radius fracture  Stable widening of the SL interval with DISI deformity          Assessment and Plan      56-year-old female currently treating non operatively for right distal radius fracture and SL ligament injury  Updated x-rays were obtained in the office today and reviewed with the patient  These demonstrated stable alignment of all injuries  Again we discussed that he may continue with non operative care at this time  She should continue with this casting for additional two more weeks  At that time she will follow up for cast removal and likely be transitioned into a removable brace for two additional weeks  She should continue cast care instructions  We again discussed that surgical intervention may be warranted in the future if this SL injury proves to be problematic for her      Follow Up: 2 weeks    To Do Next Visit: cast off, repeat xray    PROCEDURES PERFORMED:    No Procedures performed today    Scribe Attestation    I,: Pili Figueroa MA am acting as a scribe while in the presence of the attending physician :       I,:  Miya Vu MD personally performed the services described in this documentation    as scribed in my presence :

## 2021-04-07 ENCOUNTER — OFFICE VISIT (OUTPATIENT)
Dept: OBGYN CLINIC | Facility: MEDICAL CENTER | Age: 61
End: 2021-04-07

## 2021-04-07 ENCOUNTER — APPOINTMENT (OUTPATIENT)
Dept: RADIOLOGY | Facility: MEDICAL CENTER | Age: 61
End: 2021-04-07
Payer: COMMERCIAL

## 2021-04-07 VITALS
HEART RATE: 88 BPM | TEMPERATURE: 98.3 F | BODY MASS INDEX: 34.38 KG/M2 | DIASTOLIC BLOOD PRESSURE: 84 MMHG | SYSTOLIC BLOOD PRESSURE: 127 MMHG | WEIGHT: 194 LBS | HEIGHT: 63 IN

## 2021-04-07 DIAGNOSIS — S52.531A CLOSED COLLES' FRACTURE OF RIGHT RADIUS, INITIAL ENCOUNTER: ICD-10-CM

## 2021-04-07 DIAGNOSIS — S52.531D CLOSED COLLES' FRACTURE OF RIGHT RADIUS WITH ROUTINE HEALING, SUBSEQUENT ENCOUNTER: Primary | ICD-10-CM

## 2021-04-07 PROCEDURE — 3008F BODY MASS INDEX DOCD: CPT | Performed by: ORTHOPAEDIC SURGERY

## 2021-04-07 PROCEDURE — 99024 POSTOP FOLLOW-UP VISIT: CPT | Performed by: ORTHOPAEDIC SURGERY

## 2021-04-07 PROCEDURE — 73110 X-RAY EXAM OF WRIST: CPT

## 2021-04-07 NOTE — PROGRESS NOTES
History of the Present Illness     Ricky Junior is a 64 y o  female presenting for follow-up regarding her right distal radius fracture and SL ligament injury  Patient has been treating in a cast which she has had on up until today's visit  She notes a his slight decrease in her pain and does not note any new issues today  Physical Exam     /84   Pulse 88   Temp 98 3 °F (36 8 °C)   Ht 5' 3" (1 6 m)   Wt 88 kg (194 lb)   LMP  (LMP Unknown)   BMI 34 37 kg/m²     Right wrist:  Skin intact, no swelling or ecchymosis   Mild maceration in first dorsal web space  No distal radius tenderness, mild SL interval tenderness  20 wrist flexion, 20 wrist extension  Full pronation/ supination  DPC just over 0  5/5 Motor to the APB, FDI, FDP2, FDP5, EDC  Sensation intact to light touch in the median, radial, and ulnar nerve distribution  Brisk capillary refill  Palpable distal radial pulse        Data Review       Imaging:  X-rays of the right wrist obtained in the office today were personally reviewed by me and demonstrates stable SL widening with continued DISI deformity, well healed distal radius fracture  Assessment and Plan     64 y o  female distal radius fracture and stable SL ligament injury  Patient's cast was removed today and her range of motion was quite good  She is a bit stiff in flexion and extension for which home exercises were provided for her today  These were demonstrated to her in the office today  She was also provided with a cock-up wrist brace that she was instructed to wear for the next two weeks  At that time she remove forms of bracing and use her hand for all normal activities  She will be seen back in four weeks for repeat exam   No x-rays are needed at that time        Follow Up: 4 weeks    To Do Next Visit: repeat exam    PROCEDURES PERFORMED:    No Procedures performed today     Scribe Attestation    I,:  Indio Stone MA am acting as a scribe while in the presence of the attending physician :       I,:  Ana Lilia Cartwright MD personally performed the services described in this documentation    as scribed in my presence :

## 2021-04-09 ENCOUNTER — TELEPHONE (OUTPATIENT)
Dept: OBGYN CLINIC | Facility: HOSPITAL | Age: 61
End: 2021-04-09

## 2021-04-09 NOTE — TELEPHONE ENCOUNTER
Spoke to patient  She stated here thumb has blotchy redness  Reports that it is not hot to touch but feels warm on the inside  It is noticeably swollen  Advised that if she can send a photo that may be helpful  Advised that if this worsens or heat to touch or fever develop she needs to go to [facility] or ER  Patient verbalized understanding

## 2021-04-09 NOTE — TELEPHONE ENCOUNTER
Patient sees Dr Navneet Perez  Patient is calling because she had her cast removed and now her thumb is red and swollen and is warm but not hot  She would like to know if this is normal or what she should do?         CB: [phone]

## 2021-04-09 NOTE — TELEPHONE ENCOUNTER
Left patient for patient to call office  Advised that it is common to have some pain when coming out of casting, need more information  Asked her to send a picture of the thumb  Advised ice 20 min on 20 min off, elevation above the heart, tylenol alternating with NSAIDS if able to take them  Call office with more information

## 2021-04-12 NOTE — TELEPHONE ENCOUNTER
I called her after review of the photo  She has improving pain and episodic swelling and redness which she notes is more related with activity  I discussed that this is likely related to either an arthritis flare or something related to the cast that had been on previously  Certainly nothing concerning for infection  Discussed ibuprofen and icing  Patient notes that her wrist is feeling much better as well  We will see her as scheduled

## 2021-05-05 ENCOUNTER — OFFICE VISIT (OUTPATIENT)
Dept: OBGYN CLINIC | Facility: MEDICAL CENTER | Age: 61
End: 2021-05-05
Payer: COMMERCIAL

## 2021-05-05 VITALS
DIASTOLIC BLOOD PRESSURE: 87 MMHG | WEIGHT: 194 LBS | HEART RATE: 66 BPM | BODY MASS INDEX: 34.38 KG/M2 | HEIGHT: 63 IN | SYSTOLIC BLOOD PRESSURE: 151 MMHG

## 2021-05-05 DIAGNOSIS — M65.311 TRIGGER THUMB OF RIGHT HAND: ICD-10-CM

## 2021-05-05 DIAGNOSIS — S52.531A CLOSED COLLES' FRACTURE OF RIGHT RADIUS, INITIAL ENCOUNTER: Primary | ICD-10-CM

## 2021-05-05 PROCEDURE — 99024 POSTOP FOLLOW-UP VISIT: CPT | Performed by: ORTHOPAEDIC SURGERY

## 2021-05-05 PROCEDURE — 20550 NJX 1 TENDON SHEATH/LIGAMENT: CPT | Performed by: ORTHOPAEDIC SURGERY

## 2021-05-05 PROCEDURE — 99212 OFFICE O/P EST SF 10 MIN: CPT | Performed by: ORTHOPAEDIC SURGERY

## 2021-05-05 RX ADMIN — BETAMETHASONE SODIUM PHOSPHATE AND BETAMETHASONE ACETATE 3 MG: 3; 3 INJECTION, SUSPENSION INTRA-ARTICULAR; INTRALESIONAL; INTRAMUSCULAR; SOFT TISSUE at 10:36

## 2021-05-05 RX ADMIN — LIDOCAINE HYDROCHLORIDE 0.5 ML: 10 INJECTION, SOLUTION INFILTRATION; PERINEURAL at 10:36

## 2021-05-05 NOTE — PROGRESS NOTES
History of the Present Illness     Karthik Gaines is a 64 y o  female presents to the office today for a follow-up regarding her right distal radius fracture and SL ligament injury  Patient stated that she has transitioned out of her right wrist cock up brace  She stated that she has no pain about the right wrist but has pain about the right thumb  She stated that she has experienced pain about the right thumb since her cast came off  She stated that she has had swelling about right thumb  She stated that she initially had redness about the thumb when the cast first came off but has since gone away  She stated that she gets numbness about the thumb when she writes  Physical Exam     /87   Pulse 66   Ht 5' 3" (1 6 m)   Wt 88 kg (194 lb)   LMP  (LMP Unknown)   BMI 34 37 kg/m²     Right Wrist:   Skin intact   No obvious swelling   No erythema or ecchymosis   No distal radius tenderness   Full pronation and supination   Full wrist flexion and extension   DPC 0   No tenderness first dorsal compartment  Thumb CMC tender   Tender A1 pulley of thumb   No active locking upon exam of thumb   5/5 Motor to the APB, FDI, FDP2, FDP5, EDC  Sensation intact to light touch in the median, radial, and ulnar nerve distribution  Brisk capillary refill   Palpable distal radial pulse     Data Review       Imaging:  None today     Assessment and Plan       64 y o  female with right distal radius fracture, stable SL ligament injury and new right trigger thumb  I discussed the natural course and treatment options of trigger finger with the patient  We discussed that the etiology is thickening of the tendon sheath surrounding the flexor tendons in the distal palm and that common symptoms are pain, catching, clicking, popping, and locking of the digit  We also discussed that there are surgical and nonsurgical means to treat this    Activity modification can be somewhat helpful, though corticosteroid injections are often the first-line treatment for this condition  The published efficacy of the 1st injection for trigger finger is about 45% at achieving full remission and that we may pursue up to 3 injections per the patient's desire if symptomatology returns  At any point, the patient may choose to have surgical intervention which would involve release of the A1 pulley  We discussed the technical aspects, risks, and benefits of the procedure, perioperative care, and expected recovery from this surgery  Patient was offered a corticosteroid injection about the right thumb  Risks and benefits of the injection were discussed at length  Patient tolerated the injection well  I discussed that in regards to the right wrist, I am happy with the progress she has made  She may use the wrist as tolerated with no restrictions at this time  I will see her back in office in one month for a re-evaluation of her right wrist and thumb  Follow Up: 1 month     To Do Next Visit: re-evaluate right thumb and wrist       PROCEDURES PERFORMED:  Hand/upper extremity injection: R thumb A1  Universal Protocol:  Consent: Verbal consent obtained  Risks and benefits: risks, benefits and alternatives were discussed  Consent given by: patient  Time out: Immediately prior to procedure a "time out" was called to verify the correct patient, procedure, equipment, support staff and site/side marked as required    Timeout called at: 5/5/2021 10:34 AM   Patient understanding: patient states understanding of the procedure being performed  Site marked: the operative site was marked  Patient identity confirmed: verbally with patient    Supporting Documentation  Indications: pain   Procedure Details  Condition:trigger finger Location: thumb - R thumb A1   Preparation: Patient was prepped and draped in the usual sterile fashion  Needle size: 25 G  Ultrasound guidance: no  Approach: volar  Medications administered: 0 5 mL lidocaine 1 %; 3 mg betamethasone acetate-betamethasone sodium phosphate 6 (3-3) mg/mL    Patient tolerance: patient tolerated the procedure well with no immediate complications  Dressing:  Sterile dressing applied              Scribe Attestation    I,:  Alecia Lu am acting as a scribe while in the presence of the attending physician :       I,:  Stefanie Tony MD personally performed the services described in this documentation    as scribed in my presence :

## 2021-05-06 RX ORDER — BETAMETHASONE SODIUM PHOSPHATE AND BETAMETHASONE ACETATE 3; 3 MG/ML; MG/ML
3 INJECTION, SUSPENSION INTRA-ARTICULAR; INTRALESIONAL; INTRAMUSCULAR; SOFT TISSUE
Status: COMPLETED | OUTPATIENT
Start: 2021-05-05 | End: 2021-05-05

## 2021-05-06 RX ORDER — LIDOCAINE HYDROCHLORIDE 10 MG/ML
0.5 INJECTION, SOLUTION INFILTRATION; PERINEURAL
Status: COMPLETED | OUTPATIENT
Start: 2021-05-05 | End: 2021-05-05

## 2021-05-12 ENCOUNTER — OFFICE VISIT (OUTPATIENT)
Dept: FAMILY MEDICINE CLINIC | Facility: CLINIC | Age: 61
End: 2021-05-12
Payer: COMMERCIAL

## 2021-05-12 VITALS
DIASTOLIC BLOOD PRESSURE: 80 MMHG | BODY MASS INDEX: 35.12 KG/M2 | HEART RATE: 64 BPM | SYSTOLIC BLOOD PRESSURE: 118 MMHG | WEIGHT: 198.2 LBS | OXYGEN SATURATION: 97 % | TEMPERATURE: 98 F | HEIGHT: 63 IN

## 2021-05-12 DIAGNOSIS — M54.32 SCIATICA OF LEFT SIDE: Primary | ICD-10-CM

## 2021-05-12 PROCEDURE — 99213 OFFICE O/P EST LOW 20 MIN: CPT | Performed by: FAMILY MEDICINE

## 2021-05-12 RX ORDER — CYCLOBENZAPRINE HCL 10 MG
10 TABLET ORAL
Qty: 20 TABLET | Refills: 0 | Status: SHIPPED | OUTPATIENT
Start: 2021-05-12 | End: 2021-06-16 | Stop reason: SDUPTHER

## 2021-05-12 RX ORDER — MELOXICAM 15 MG/1
15 TABLET ORAL DAILY
Qty: 30 TABLET | Refills: 0 | Status: SHIPPED | OUTPATIENT
Start: 2021-05-12 | End: 2021-06-04

## 2021-05-12 NOTE — PROGRESS NOTES
Assessment/Plan:     1  Sciatica of left side  -     Ambulatory referral to Physical Therapy; Future  -     meloxicam (MOBIC) 15 mg tablet; Take 1 tablet (15 mg total) by mouth daily  -     cyclobenzaprine (FLEXERIL) 10 mg tablet; Take 1 tablet (10 mg total) by mouth daily at bedtime As needed for muscle spasm      I recommend return to office if no improvement or worsening symptoms  Subjective:      Patient ID: Willem Workman is a 64 y o  female  Patient with sciatica of the left side  Onset 1 week ago  Denies any bowel or bladder incontinence  Denies any trauma  The following portions of the patient's history were reviewed and updated as appropriate: allergies, current medications, past family history, past medical history, past social history, past surgical history, and problem list     Review of Systems   Constitutional: Negative  HENT: Negative  Eyes: Negative  Respiratory: Negative  Cardiovascular: Negative  Gastrointestinal: Negative  Endocrine: Negative  Genitourinary: Negative  Musculoskeletal: Negative  Skin: Negative  Allergic/Immunologic: Negative  Neurological: Negative  Hematological: Negative  Psychiatric/Behavioral: Negative            Objective:      /80 (BP Location: Left arm, Patient Position: Sitting, Cuff Size: Standard)   Pulse 64   Temp 98 °F (36 7 °C) (Temporal)   Ht 5' 3 25" (1 607 m)   Wt 89 9 kg (198 lb 3 2 oz)   LMP  (LMP Unknown)   SpO2 97%   BMI 34 83 kg/m²          Physical Exam

## 2021-05-12 NOTE — PROGRESS NOTES
BMI Counseling: Body mass index is 34 83 kg/m²  The BMI is above normal  Nutrition recommendations include reducing portion sizes

## 2021-05-13 ENCOUNTER — EVALUATION (OUTPATIENT)
Dept: PHYSICAL THERAPY | Facility: CLINIC | Age: 61
End: 2021-05-13
Payer: COMMERCIAL

## 2021-05-13 DIAGNOSIS — M54.32 SCIATICA OF LEFT SIDE: Primary | ICD-10-CM

## 2021-05-13 DIAGNOSIS — I10 ESSENTIAL HYPERTENSION: ICD-10-CM

## 2021-05-13 PROCEDURE — 97161 PT EVAL LOW COMPLEX 20 MIN: CPT

## 2021-05-13 PROCEDURE — 97140 MANUAL THERAPY 1/> REGIONS: CPT

## 2021-05-14 RX ORDER — HYDROCHLOROTHIAZIDE 12.5 MG/1
TABLET ORAL
Qty: 90 TABLET | Refills: 3 | Status: SHIPPED | OUTPATIENT
Start: 2021-05-14 | End: 2022-05-10

## 2021-05-14 RX ORDER — LOSARTAN POTASSIUM 50 MG/1
TABLET ORAL
Qty: 90 TABLET | Refills: 3 | Status: SHIPPED | OUTPATIENT
Start: 2021-05-14 | End: 2022-04-14 | Stop reason: SDUPTHER

## 2021-05-17 ENCOUNTER — OFFICE VISIT (OUTPATIENT)
Dept: PHYSICAL THERAPY | Facility: CLINIC | Age: 61
End: 2021-05-17
Payer: COMMERCIAL

## 2021-05-17 DIAGNOSIS — M54.32 SCIATICA OF LEFT SIDE: Primary | ICD-10-CM

## 2021-05-17 PROCEDURE — 97140 MANUAL THERAPY 1/> REGIONS: CPT
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
## 2021-05-18 ENCOUNTER — TELEPHONE (OUTPATIENT)
Dept: FAMILY MEDICINE CLINIC | Facility: CLINIC | Age: 61
End: 2021-05-18

## 2021-05-18 NOTE — TELEPHONE ENCOUNTER
Patient called stating that she started physical therapy but now 3/4 of her foot is numb  Please advise

## 2021-05-19 ENCOUNTER — OFFICE VISIT (OUTPATIENT)
Dept: PHYSICAL THERAPY | Facility: CLINIC | Age: 61
End: 2021-05-19
Payer: COMMERCIAL

## 2021-05-19 DIAGNOSIS — M54.32 SCIATICA OF LEFT SIDE: Primary | ICD-10-CM

## 2021-05-19 PROCEDURE — 97110 THERAPEUTIC EXERCISES: CPT

## 2021-05-19 PROCEDURE — 97112 NEUROMUSCULAR REEDUCATION: CPT

## 2021-05-19 NOTE — TELEPHONE ENCOUNTER
LMOM for pt to call back  UROLOGY OFFICE VISIT NOTE-FEMALE      UROLOGY CHIEF COMPLAINT  Chief Complaint   Patient presents with   • Follow-up       Assessment:  1. Overactive bladder with a nighttime incontinence, unresponsive to anticholinergics.  2. Chronic/recurrent urinary tract infections    Plan:  1. Urine for culture, treat appropriately.  Consider suppressive antibiotic therapy again.  2. Trial of Myrbetriq 25 mg daily  3. Follow-up in 1 month    FIONA Layne is a 89 year old female who presents in follow-up for incontinence and recurrent urinary tract infections.   she had been on oxybutynin ER 15 mg. This was discontinued.  She has noticed no significant change in her urinary habits.  She continues to try to void every 3-4 hours.  She will have have occasional urge to urinate during the day.  She is  getting up 2-3 times a night.  This is mostly to change her depends which get soaked throughout the night.  The urge to urinate does not wake her up.  She will use 2-3 depends per day.  She does empty her bladder.  A recent renal ultrasound showed no evidence of renal stone or obstruction.  She has no interest in treatment such as Botox.    ACTIVE PROBLEMS  Patient Active Problem List   Diagnosis   • Hyperlipidemia   • HTN (hypertension)   • Generalized OA   • Erosive osteoarthritis of hand   • Type 2 diabetes mellitus with stage 3 chronic kidney disease (CMS/HCC)   • Osteoporosis   • Long-term use of Plaquenil   • Anemia of chronic disease   • Asymptomatic bacteriuria   • Urinary incontinence without sensory awareness   • Stage 3 chronic kidney disease (CMS/HCC)       HISTORIES  Past Medical History:   Diagnosis Date   • Arthritis    • Displacement of intervertebral disc, site unspecified, without myelopathy    • Gastroesophageal reflux disease    • HTN (hypertension)    • Hyperlipidemia    • Incontinence    • Sinusitis, chronic    • Type II or unspecified type diabetes mellitus without mention of complication, not  stated as uncontrolled    • Urinary incontinence    • Urinary tract infection    • Vertigo        Past Surgical History:   Procedure Laterality Date   • Appendectomy     • Cataract extrac w/ intraocular lens imp&ant vit,bilaterl     • Eye surgery      cataract removal bilateral   • Service to gastroenterology  1/10/2014    EGD   • Tubal ligation     • Vaginal delivery      7 children       Family History   Problem Relation Age of Onset   • Heart Father          at 65   • Diabetes Mother          at 63       Social History     Socioeconomic History   • Marital status:      Spouse name: Not on file   • Number of children: Not on file   • Years of education: Not on file   • Highest education level: Not on file   Occupational History   • Not on file   Social Needs   • Financial resource strain: Not on file   • Food insecurity:     Worry: Not on file     Inability: Not on file   • Transportation needs:     Medical: Not on file     Non-medical: Not on file   Tobacco Use   • Smoking status: Never Smoker   • Smokeless tobacco: Never Used   Substance and Sexual Activity   • Alcohol use: No     Alcohol/week: 0.0 standard drinks   • Drug use: No   • Sexual activity: Not Currently   Lifestyle   • Physical activity:     Days per week: Not on file     Minutes per session: Not on file   • Stress: Not on file   Relationships   • Social connections:     Talks on phone: Not on file     Gets together: Not on file     Attends Congregation service: Not on file     Active member of club or organization: Not on file     Attends meetings of clubs or organizations: Not on file     Relationship status: Not on file   • Intimate partner violence:     Fear of current or ex partner: Not on file     Emotionally abused: Not on file     Physically abused: Not on file     Forced sexual activity: Not on file   Other Topics Concern   • Not on file   Social History Narrative   • Not on file       ALLERGIES  ALLERGIES:  No Known  Allergies    MEDICATIONS  Current Outpatient Medications   Medication Sig   • SALINE NASAL SPRAY NA Spray 1 spray in each nostril daily as needed (nasal signs and symptoms).   • omeprazole (PRILOSEC) 20 MG capsule Take 1 capsule by mouth daily. Appointment needed for further refills   • glimepiride (AMARYL) 1 MG tablet TAKE ONE-HALF TABLET BY MOUTH 2 TIMES DAILY   • amLODIPine (NORVASC) 5 MG tablet TAKE 1 TABLET BY MOUTH DAILY. MAY CAUSE DIZZINESS   • simvastatin (ZOCOR) 20 MG tablet TAKE 1 TABLET BY MOUTH DAILY. LET DOCTOR KNOW IF MUSCLE TENDERNESS DEVELOPS (Patient taking differently: nightly. )   • oxybutynin (DITROPAN XL) 15 MG 24 hr tablet TAKE 1 TABLET BY MOUTH DAILY.   • diclofenac (VOLTAREN) 1 % gel APPLY 2 GRAMS TO AFFECTED AREA 4 TIMES DAILY. STORE AT ROOM TEMPERATURE BELOW 77 DEGREES. DO NOT FREEZE.   • TRUE METRIX BLOOD GLUCOSE TEST test strip TEST BLOOD SUGAR 1 TIMES DAILY AS DIRECTED. DIAGNOSIS: E11.9. METER: TRUE METRIX METER   • acetaminophen (TYLENOL) 500 MG tablet Take 2 tablets by mouth 2 times daily.    • EMBRACE LANCETS ULTRA THIN 30G Misc USE TO TEST BLOOD SUGARS TWICE DAILY.ICD-9 code E11.9   • azelastine (ASTELIN) 0.1 % nasal spray SPRAY 2 SPRAYS IN EACH NOSTRIL 2 TIMES DAILY. STORE AT ROOM TEMPERATURE BELOW 77 DEGREES DO NOT FREEZE   • blood glucose (TRUE METRIX BLOOD GLUCOSE TEST) test strip Test blood sugar 1 times daily as directed. Diagnosis: E11.9. Meter: True metrix   • MISC NATURAL PRODUCTS PO Take 1 lozenge by mouth as needed. Mucinex lozenges to prevent sore throat   • Zinc Sulfate (ZINC 15 PO) Take 1 tablet by mouth as needed (when has a cold).    • blood glucose meter Test blood sugar 1 times daily as directed. Diagnosis: Pre-Diabetes R73.03 Meter: pharmacy choice.   • trolamine salicylate (ASPERCREME) 10 % cream Apply 1 application topically 2 times daily as needed for Pain. Apply to feet   • aspirin 81 MG tablet Take 81 mg by mouth daily.   • Cholecalciferol (VITAMIN D-3 PO)  Take 1 capsule by mouth daily.    • ferrous sulfate 325 (65 FE) MG tablet Take 325 mg by mouth 3 days a week. Monday, Wednesday, Friday   • calcium carbonate (CALCIUM 600) 600 MG TABS Take 600 mg by mouth 2 times daily.    • fish oil-omega-3 fatty acids 1000 MG CAPS Take 1 capsule by mouth 2 times daily.    • Magnesium 250 MG TABS Take 250 mg by mouth every evening.    • Selenium 200 MCG TABS Take 200 mcg by mouth every evening.    • cetirizine (ZYRTEC ALLERGY) 10 MG tablet Take 10 mg by mouth daily.     No current facility-administered medications for this visit.        REVIEW OF SYSTEMS  As noted in the HPI.    PHYSICAL EXAMINATION  Vitals signs:    Visit Vitals  /56   Pulse 60   Temp 96.5 °F (35.8 °C)   Ht 5' 1\" (1.549 m)   Wt 52.6 kg   BMI 21.92 kg/m²     General: Well developed. No9 distress.  HEENT:  Normocephalic  Neck:   Symmetric. Midline trachea.  Cardiac: Regular rate and rhythm  Lung: No labored breathing.  Abdomen:  Not obese. Non-distended.   Neurologic:  No gross neurologic findings. Moves all extremities. Strength grossly normal Gait is aided by a cane.  Psych:   Pleasant and appropriate affect.  Not anxious.  Skin: No rashes where seen.  Extremities:  No swelling noted.    PERTINENT RESULTS    Urinalysis:  Dipstick positive for trace leukocytes, 2+ protein, moderate blood.  Microscopically shows greater than 100 white cells and 10-20 red cells per high-power field      Lab Results   Component Value Date    SODIUM 143 08/03/2020    POTASSIUM 3.9 08/03/2020    CHLORIDE 111 (H) 08/03/2020    CO2 23 08/03/2020    CALCIUM 9.2 08/03/2020    GLUCOSE 102 (H) 08/03/2020    CREATININE 1.10 (H) 08/03/2020    CREATININE 1.05 (H) 08/02/2020    CREATININE 1.07 (H) 08/01/2020       Lab Results   Component Value Date    WBC 4.9 08/03/2020    HGB 10.9 (L) 08/03/2020    HCT 33.8 (L) 08/03/2020     08/03/2020         NEWTON SANTANA MD
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
## 2021-05-24 ENCOUNTER — OFFICE VISIT (OUTPATIENT)
Dept: PHYSICAL THERAPY | Facility: CLINIC | Age: 61
End: 2021-05-24
Payer: COMMERCIAL

## 2021-05-24 DIAGNOSIS — M54.32 SCIATICA OF LEFT SIDE: Primary | ICD-10-CM

## 2021-05-24 PROCEDURE — 97112 NEUROMUSCULAR REEDUCATION: CPT

## 2021-05-24 PROCEDURE — 97140 MANUAL THERAPY 1/> REGIONS: CPT

## 2021-05-24 PROCEDURE — 97110 THERAPEUTIC EXERCISES: CPT
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
## 2021-05-26 ENCOUNTER — OFFICE VISIT (OUTPATIENT)
Dept: OBGYN CLINIC | Facility: MEDICAL CENTER | Age: 61
End: 2021-05-26
Payer: COMMERCIAL

## 2021-05-26 ENCOUNTER — OFFICE VISIT (OUTPATIENT)
Dept: PHYSICAL THERAPY | Facility: CLINIC | Age: 61
End: 2021-05-26
Payer: COMMERCIAL

## 2021-05-26 VITALS
HEIGHT: 63 IN | WEIGHT: 197 LBS | BODY MASS INDEX: 34.91 KG/M2 | DIASTOLIC BLOOD PRESSURE: 86 MMHG | HEART RATE: 79 BPM | TEMPERATURE: 97.8 F | SYSTOLIC BLOOD PRESSURE: 133 MMHG

## 2021-05-26 DIAGNOSIS — G89.29 CHRONIC LEFT-SIDED LOW BACK PAIN WITH LEFT-SIDED SCIATICA: Primary | ICD-10-CM

## 2021-05-26 DIAGNOSIS — M54.42 CHRONIC LEFT-SIDED LOW BACK PAIN WITH LEFT-SIDED SCIATICA: Primary | ICD-10-CM

## 2021-05-26 DIAGNOSIS — M54.32 SCIATICA OF LEFT SIDE: Primary | ICD-10-CM

## 2021-05-26 PROCEDURE — 99214 OFFICE O/P EST MOD 30 MIN: CPT | Performed by: STUDENT IN AN ORGANIZED HEALTH CARE EDUCATION/TRAINING PROGRAM

## 2021-05-26 PROCEDURE — 97110 THERAPEUTIC EXERCISES: CPT

## 2021-05-26 PROCEDURE — 97140 MANUAL THERAPY 1/> REGIONS: CPT

## 2021-05-26 RX ORDER — GABAPENTIN 300 MG/1
300 CAPSULE ORAL 3 TIMES DAILY
Qty: 90 CAPSULE | Refills: 0 | Status: SHIPPED | OUTPATIENT
Start: 2021-05-26 | End: 2021-11-12 | Stop reason: ALTCHOICE

## 2021-05-26 RX ORDER — METHYLPREDNISOLONE 4 MG/1
TABLET ORAL
Qty: 1 EACH | Refills: 0 | Status: SHIPPED | OUTPATIENT
Start: 2021-05-26 | End: 2021-11-12 | Stop reason: ALTCHOICE
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
## 2021-05-26 NOTE — PATIENT INSTRUCTIONS
Sciatica   WHAT YOU NEED TO KNOW:   Sciatica is a condition that causes pain along your sciatic nerve  The sciatic nerve runs from your spine through both sides of your buttocks  It then runs down the back of your thigh, into your lower leg and foot  Your sciatic nerve may be compressed, inflamed, irritated, or stretched  DISCHARGE INSTRUCTIONS:   Medicines:   · NSAIDs:  These medicines decrease swelling and pain  NSAIDs are available without a doctor's order  Ask your healthcare provider which medicine is right for you  Ask how much to take and when to take it  Take as directed  NSAIDs can cause stomach bleeding or kidney problems if not taken correctly  · Acetaminophen: This medicine decreases pain  Acetaminophen is available without a doctor's order  Ask how much to take and when to take it  Follow directions  Acetaminophen can cause liver damage if not taken correctly  · Muscle relaxers  help decrease pain and muscle spasms  · Take your medicine as directed  Contact your healthcare provider if you think your medicine is not helping or if you have side effects  Tell him of her if you are allergic to any medicine  Keep a list of the medicines, vitamins, and herbs you take  Include the amounts, and when and why you take them  Bring the list or the pill bottles to follow-up visits  Carry your medicine list with you in case of an emergency  Follow up with your healthcare provider as directed:  Write down your questions so you remember to ask them during your visits  Manage your symptoms:   · Activity:  Decrease your activity  Do not lift heavy objects or twist your back for at least 6 weeks  Slowly return to your usual activity  · Ice:  Ice helps decrease swelling and pain  Ice may also help prevent tissue damage  Use an ice pack, or put crushed ice in a plastic bag  Cover it with a towel and place it on your low back or leg for 15 to 20 minutes every hour or as directed      · Heat:  Heat helps decrease pain and muscle spasms  Apply heat on the area for 20 to 30 minutes every 2 hours for as many days as directed  · Physical therapy:  You may need to see physical therapist to teach you exercises to help improve movement and strength, and to decrease pain  An occupational therapist teaches you skills to help with your daily activities  · Use assistive devices if directed: You may need to wear back support, such as a back brace  You may need crutches, a cane, or a walker to decrease stress on your lower back and leg muscles  Ask your healthcare provider for more information about assistive devices and how to use them correctly  Self-care:   · Avoid pressure on your back and legs:  Do not  lift heavy objects, or stand or sit for long periods of time  · Lift objects safely:  Keep your back straight and bend your knees when you  an object  Do not bend or twist your back when you lift  · Maintain a healthy weight:  Ask your healthcare provider how much you should weigh  Ask him to help you create a weight loss plan if you are overweight  · Exercise:  Ask your healthcare provider about the best stretching, warmup, and exercise plan for you  Contact your healthcare provider if:   · You have pain in your lower back at night or when resting  · You have pain in your lower back with numbness below the knee  · You have weakness in one leg only  · You have questions or concerns about your condition or care  Return to the emergency department if:   · You have trouble holding back your urine or bowel movements  · You have weakness in both legs  · You have numbness in your groin or buttocks  © Copyright [address] Information is for End User's use only and may not be sold, redistributed or otherwise used for commercial purposes   All illustrations and images included in CareNotes® are the copyrighted property of [employer]  or Daniela Ivey  The above information is an  only  It is not intended as medical advice for individual conditions or treatments  Talk to your doctor, nurse or pharmacist before following any medical regimen to see if it is safe and effective for you

## 2021-06-02 ENCOUNTER — OFFICE VISIT (OUTPATIENT)
Dept: PHYSICAL THERAPY | Facility: CLINIC | Age: 61
End: 2021-06-02
Payer: COMMERCIAL

## 2021-06-02 DIAGNOSIS — M54.32 SCIATICA OF LEFT SIDE: Primary | ICD-10-CM

## 2021-06-02 PROCEDURE — 97140 MANUAL THERAPY 1/> REGIONS: CPT

## 2021-06-02 PROCEDURE — 97110 THERAPEUTIC EXERCISES: CPT
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
## 2021-06-04 ENCOUNTER — OFFICE VISIT (OUTPATIENT)
Dept: PHYSICAL THERAPY | Facility: CLINIC | Age: 61
End: 2021-06-04
Payer: COMMERCIAL

## 2021-06-04 DIAGNOSIS — M54.32 SCIATICA OF LEFT SIDE: ICD-10-CM

## 2021-06-04 DIAGNOSIS — M54.32 SCIATICA OF LEFT SIDE: Primary | ICD-10-CM

## 2021-06-04 PROCEDURE — 97140 MANUAL THERAPY 1/> REGIONS: CPT

## 2021-06-04 PROCEDURE — 97110 THERAPEUTIC EXERCISES: CPT

## 2021-06-04 RX ORDER — MELOXICAM 15 MG/1
TABLET ORAL
Qty: 30 TABLET | Refills: 0 | Status: SHIPPED | OUTPATIENT
Start: 2021-06-04 | End: 2021-06-16
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
## 2021-06-16 ENCOUNTER — OFFICE VISIT (OUTPATIENT)
Dept: OBGYN CLINIC | Facility: MEDICAL CENTER | Age: 61
End: 2021-06-16
Payer: COMMERCIAL

## 2021-06-16 VITALS
HEIGHT: 63 IN | BODY MASS INDEX: 34.91 KG/M2 | TEMPERATURE: 98.1 F | WEIGHT: 197 LBS | SYSTOLIC BLOOD PRESSURE: 131 MMHG | HEART RATE: 73 BPM | DIASTOLIC BLOOD PRESSURE: 87 MMHG

## 2021-06-16 DIAGNOSIS — G89.29 CHRONIC LEFT-SIDED LOW BACK PAIN WITH LEFT-SIDED SCIATICA: Primary | ICD-10-CM

## 2021-06-16 DIAGNOSIS — M54.42 CHRONIC LEFT-SIDED LOW BACK PAIN WITH LEFT-SIDED SCIATICA: Primary | ICD-10-CM

## 2021-06-16 DIAGNOSIS — M54.32 SCIATICA OF LEFT SIDE: ICD-10-CM

## 2021-06-16 PROCEDURE — 99213 OFFICE O/P EST LOW 20 MIN: CPT | Performed by: STUDENT IN AN ORGANIZED HEALTH CARE EDUCATION/TRAINING PROGRAM

## 2021-06-16 PROCEDURE — 1036F TOBACCO NON-USER: CPT | Performed by: STUDENT IN AN ORGANIZED HEALTH CARE EDUCATION/TRAINING PROGRAM

## 2021-06-16 PROCEDURE — 3008F BODY MASS INDEX DOCD: CPT | Performed by: STUDENT IN AN ORGANIZED HEALTH CARE EDUCATION/TRAINING PROGRAM

## 2021-06-16 RX ORDER — CYCLOBENZAPRINE HCL 10 MG
10 TABLET ORAL
Qty: 60 TABLET | Refills: 0 | Status: SHIPPED | OUTPATIENT
Start: 2021-06-16 | End: 2022-02-07
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
## 2021-08-11 ENCOUNTER — OFFICE VISIT (OUTPATIENT)
Dept: OBGYN CLINIC | Facility: MEDICAL CENTER | Age: 61
End: 2021-08-11
Payer: COMMERCIAL

## 2021-08-11 VITALS
SYSTOLIC BLOOD PRESSURE: 151 MMHG | BODY MASS INDEX: 34.91 KG/M2 | HEIGHT: 63 IN | HEART RATE: 71 BPM | WEIGHT: 197 LBS | DIASTOLIC BLOOD PRESSURE: 90 MMHG

## 2021-08-11 DIAGNOSIS — G56.01 CARPAL TUNNEL SYNDROME OF RIGHT WRIST: Primary | ICD-10-CM

## 2021-08-11 DIAGNOSIS — S52.531A CLOSED COLLES' FRACTURE OF RIGHT RADIUS, INITIAL ENCOUNTER: ICD-10-CM

## 2021-08-11 PROCEDURE — 99213 OFFICE O/P EST LOW 20 MIN: CPT | Performed by: ORTHOPAEDIC SURGERY
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
## 2021-08-18 ENCOUNTER — HOSPITAL ENCOUNTER (OUTPATIENT)
Dept: ULTRASOUND IMAGING | Facility: HOSPITAL | Age: 61
Discharge: HOME | End: 2021-08-18
Attending: ORTHOPAEDIC SURGERY
Payer: COMMERCIAL

## 2021-08-18 DIAGNOSIS — G56.01 CARPAL TUNNEL SYNDROME OF RIGHT WRIST: ICD-10-CM

## 2021-08-18 PROCEDURE — 76882 US LMTD JT/FCL EVL NVASC XTR: CPT

## 2021-08-27 ENCOUNTER — OFFICE VISIT (OUTPATIENT)
Dept: OBGYN CLINIC | Facility: CLINIC | Age: 61
End: 2021-08-27
Payer: COMMERCIAL

## 2021-08-27 ENCOUNTER — TELEPHONE (OUTPATIENT)
Dept: OBGYN CLINIC | Facility: CLINIC | Age: 61
End: 2021-08-27

## 2021-08-27 VITALS
SYSTOLIC BLOOD PRESSURE: 163 MMHG | DIASTOLIC BLOOD PRESSURE: 106 MMHG | HEART RATE: 65 BPM | HEIGHT: 63 IN | WEIGHT: 193.4 LBS | BODY MASS INDEX: 34.27 KG/M2

## 2021-08-27 DIAGNOSIS — G56.01 RIGHT CARPAL TUNNEL SYNDROME: Primary | ICD-10-CM

## 2021-08-27 PROCEDURE — 1036F TOBACCO NON-USER: CPT | Performed by: ORTHOPAEDIC SURGERY

## 2021-08-27 PROCEDURE — 99214 OFFICE O/P EST MOD 30 MIN: CPT | Performed by: ORTHOPAEDIC SURGERY

## 2021-08-27 RX ORDER — IBUPROFEN 600 MG/1
TABLET ORAL
Qty: 30 TABLET | Refills: 0 | Status: SHIPPED | OUTPATIENT
Start: 2021-08-27 | End: 2021-11-12 | Stop reason: ALTCHOICE

## 2021-08-27 RX ORDER — ACETAMINOPHEN 500 MG
TABLET ORAL
Qty: 30 TABLET | Refills: 0 | Status: SHIPPED | OUTPATIENT
Start: 2021-08-27 | End: 2021-11-12 | Stop reason: ALTCHOICE
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
## 2021-09-08 ENCOUNTER — TELEPHONE (OUTPATIENT)
Dept: OBGYN CLINIC | Facility: CLINIC | Age: 61
End: 2021-09-08

## 2021-09-10 NOTE — TELEPHONE ENCOUNTER
Patient would like a call at [phone]   She will be available until 2:30pm - [facility]

## 2021-09-13 ENCOUNTER — TELEPHONE (OUTPATIENT)
Dept: OBGYN CLINIC | Facility: HOSPITAL | Age: 61
End: 2021-09-13

## 2021-09-15 ENCOUNTER — TELEPHONE (OUTPATIENT)
Dept: OBGYN CLINIC | Facility: CLINIC | Age: 61
End: 2021-09-15

## 2021-10-22 ENCOUNTER — VBI (OUTPATIENT)
Dept: ADMINISTRATIVE | Facility: OTHER | Age: 61
End: 2021-10-22

## 2021-10-25 DIAGNOSIS — G43.909 MIGRAINE WITHOUT STATUS MIGRAINOSUS, NOT INTRACTABLE, UNSPECIFIED MIGRAINE TYPE: ICD-10-CM

## 2021-10-25 RX ORDER — RIZATRIPTAN BENZOATE 10 MG/1
TABLET, ORALLY DISINTEGRATING ORAL
Qty: 27 TABLET | Refills: 0 | Status: SHIPPED | OUTPATIENT
Start: 2021-10-25

## 2021-11-12 ENCOUNTER — OFFICE VISIT (OUTPATIENT)
Dept: OBGYN CLINIC | Facility: HOSPITAL | Age: 61
End: 2021-11-12
Payer: COMMERCIAL

## 2021-11-12 VITALS
HEIGHT: 63 IN | SYSTOLIC BLOOD PRESSURE: 154 MMHG | HEART RATE: 61 BPM | DIASTOLIC BLOOD PRESSURE: 90 MMHG | BODY MASS INDEX: 34.27 KG/M2 | WEIGHT: 193.4 LBS

## 2021-11-12 DIAGNOSIS — G56.01 CARPAL TUNNEL SYNDROME ON RIGHT: ICD-10-CM

## 2021-11-12 DIAGNOSIS — S52.531A CLOSED COLLES' FRACTURE OF RIGHT RADIUS, INITIAL ENCOUNTER: Primary | ICD-10-CM

## 2021-11-12 PROCEDURE — 99214 OFFICE O/P EST MOD 30 MIN: CPT | Performed by: ORTHOPAEDIC SURGERY

## 2021-11-12 PROCEDURE — 3008F BODY MASS INDEX DOCD: CPT | Performed by: FAMILY MEDICINE

## 2021-11-12 RX ORDER — LIDOCAINE HYDROCHLORIDE AND EPINEPHRINE 10; 10 MG/ML; UG/ML
20 INJECTION, SOLUTION INFILTRATION; PERINEURAL ONCE
Status: CANCELLED | OUTPATIENT
Start: 2021-11-12 | End: 2021-11-12

## 2021-11-16 ENCOUNTER — TELEMEDICINE (OUTPATIENT)
Dept: FAMILY MEDICINE CLINIC | Facility: CLINIC | Age: 61
End: 2021-11-16
Payer: COMMERCIAL

## 2021-11-16 ENCOUNTER — NURSE TRIAGE (OUTPATIENT)
Dept: OTHER | Facility: OTHER | Age: 61
End: 2021-11-16

## 2021-11-16 VITALS — TEMPERATURE: 97.3 F

## 2021-11-16 DIAGNOSIS — R51.9 ACUTE NONINTRACTABLE HEADACHE, UNSPECIFIED HEADACHE TYPE: Primary | ICD-10-CM

## 2021-11-16 PROCEDURE — 1036F TOBACCO NON-USER: CPT | Performed by: FAMILY MEDICINE

## 2021-11-16 PROCEDURE — 99214 OFFICE O/P EST MOD 30 MIN: CPT | Performed by: FAMILY MEDICINE

## 2021-11-16 RX ORDER — ONDANSETRON 4 MG/1
4 TABLET, FILM COATED ORAL EVERY 8 HOURS PRN
COMMUNITY
Start: 2021-11-13

## 2021-11-16 RX ORDER — METHYLPREDNISOLONE 4 MG/1
TABLET ORAL
Qty: 21 EACH | Refills: 0 | Status: SHIPPED | OUTPATIENT
Start: 2021-11-16 | End: 2022-02-07

## 2021-12-21 ENCOUNTER — VBI (OUTPATIENT)
Dept: ADMINISTRATIVE | Facility: OTHER | Age: 61
End: 2021-12-21

## 2021-12-23 ENCOUNTER — VBI (OUTPATIENT)
Dept: ADMINISTRATIVE | Facility: OTHER | Age: 61
End: 2021-12-23

## 2022-01-04 ENCOUNTER — TELEPHONE (OUTPATIENT)
Dept: OTHER | Facility: OTHER | Age: 62
End: 2022-01-04

## 2022-01-04 NOTE — TELEPHONE ENCOUNTER
Patient called regarding she got her Booster Vaccine  At [place] in [place] and would like that to be Added to My Chart

## 2022-01-05 NOTE — TELEPHONE ENCOUNTER
Attempted to call pt back just to inform her she will need to bring her COVID bosster card into office for us to scan or she can upload her card onto her MyChart if that is possible for her  LMOM for her to call back, nothing urgent just want to  keep communication open

## 2022-01-05 NOTE — TELEPHONE ENCOUNTER
S/w pt to confirm LOT # of vaccine/ manufacture- [address] # [id number]  ADMINISTERED 12/17/2021  AT [facility] [facility] STORE # [phone] Drysol Pregnancy And Lactation Text: This medication is considered safe during pregnancy and breast feeding.

## 2022-02-07 NOTE — PRE-PROCEDURE INSTRUCTIONS
Pre-Surgery Instructions:   Medication Instructions    Cholecalciferol (VITAMIN D3) 2000 units capsule Pt will hold vitamins and supplements prior to sx   hydrochlorothiazide (HYDRODIURIL) 12 5 mg tablet Instructed patient to continue taking as prescribed     levothyroxine 50 mcg tablet  Instructed patient to continue taking as prescribed     losartan (COZAAR) 50 mg tablet  Instructed patient to continue taking as prescribed        Magnesium 500 MG TABS Pt will hold vitamins and supplements prior to sx   ondansetron (ZOFRAN) 4 mg tablet  Instructed patient to continue taking as prescribed        rizatriptan (MAXALT-MLT) 10 MG disintegrating tablet  Instructed patient to continue taking as prescribed        Med list reviewed as above  Also instructed pt not to start any new vitamins/supplements preoperatively and to avoid NSAID's  3 days prior to surgery  Tylenol is acceptable if needed  Pt has and/or is getting CHG surgical soap and verbalizes understanding of preoperative showering protocol  Reviewed [facility] current covid visitor restriction policy and pt understands that it may change at any time  All information within "My Surgical Experience" pamphlet regarding a local procedure  reviewed and patient verbalizes understanding and compliance  All questions answered

## 2022-02-08 DIAGNOSIS — E03.9 HYPOTHYROIDISM, UNSPECIFIED TYPE: ICD-10-CM

## 2022-02-08 RX ORDER — LEVOTHYROXINE SODIUM 0.05 MG/1
TABLET ORAL
Qty: 90 TABLET | Refills: 0 | Status: SHIPPED | OUTPATIENT
Start: 2022-02-08 | End: 2022-05-10

## 2022-02-10 ENCOUNTER — HOSPITAL ENCOUNTER (OUTPATIENT)
Facility: HOSPITAL | Age: 62
Setting detail: OUTPATIENT SURGERY
Discharge: HOME | End: 2022-02-10
Attending: ORTHOPAEDIC SURGERY | Admitting: ORTHOPAEDIC SURGERY
Payer: COMMERCIAL

## 2022-02-10 VITALS
DIASTOLIC BLOOD PRESSURE: 88 MMHG | SYSTOLIC BLOOD PRESSURE: 170 MMHG | HEIGHT: 63 IN | BODY MASS INDEX: 34.2 KG/M2 | OXYGEN SATURATION: 97 % | RESPIRATION RATE: 12 BRPM | WEIGHT: 193 LBS | TEMPERATURE: 97.4 F | HEART RATE: 67 BPM

## 2022-02-10 DIAGNOSIS — G56.01 CARPAL TUNNEL SYNDROME ON RIGHT: Primary | ICD-10-CM

## 2022-02-10 PROCEDURE — 29848 WRIST ENDOSCOPY/SURGERY: CPT | Performed by: ORTHOPAEDIC SURGERY

## 2022-02-10 PROCEDURE — NC001 PR NO CHARGE: Performed by: PHYSICIAN ASSISTANT

## 2022-02-10 PROCEDURE — 99024 POSTOP FOLLOW-UP VISIT: CPT | Performed by: ORTHOPAEDIC SURGERY

## 2022-02-10 PROCEDURE — 29848 WRIST ENDOSCOPY/SURGERY: CPT | Performed by: PHYSICIAN ASSISTANT

## 2022-02-10 RX ORDER — NAPROXEN SODIUM 220 MG
220 TABLET ORAL 2 TIMES DAILY WITH MEALS
Qty: 20 TABLET | Refills: 0 | Status: SHIPPED | OUTPATIENT
Start: 2022-02-10 | End: 2022-06-20

## 2022-02-10 RX ORDER — HYDROCODONE BITARTRATE AND ACETAMINOPHEN 5; 325 MG/1; MG/1
1 TABLET ORAL EVERY 6 HOURS PRN
Qty: 5 TABLET | Refills: 0 | Status: SHIPPED | OUTPATIENT
Start: 2022-02-10 | End: 2022-02-15

## 2022-02-10 RX ORDER — SENNOSIDES 8.6 MG
650 CAPSULE ORAL EVERY 8 HOURS PRN
Qty: 30 TABLET | Refills: 0 | Status: SHIPPED | OUTPATIENT
Start: 2022-02-10

## 2022-02-10 RX ORDER — LIDOCAINE HYDROCHLORIDE AND EPINEPHRINE 10; 10 MG/ML; UG/ML
20 INJECTION, SOLUTION INFILTRATION; PERINEURAL ONCE
Status: COMPLETED | OUTPATIENT
Start: 2022-02-10 | End: 2022-02-10

## 2022-02-10 RX ADMIN — LIDOCAINE HYDROCHLORIDE,EPINEPHRINE BITARTRATE 16 ML: 10; .01 INJECTION, SOLUTION INFILTRATION; PERINEURAL at 07:28

## 2022-02-10 NOTE — OP NOTE
OPERATIVE REPORT  PATIENT NAME: Miriam Jean Baptiste  :  1960  MRN: [id number]  Pt Location:  MAIN OR    SURGERY DATE: 02/10/22    Surgeon(s) and Role:     * Leticia Piña MD - Primary     * Sushma Francis PA-C - Assisting    Pre-Op Diagnosis:  Carpal tunnel syndrome on right [G56 01]    Post-Op Diagnosis:   Carpal tunnel syndrome on right [G56 01]    Procedure(s) (LRB):  Right endoscopic carpal tunnel release (Right)    Specimen(s):  * No orders in the log *    Estimated Blood Loss:   Minimal      Anesthesia Type:   Local    Operative Indications: The patient has a history of right carpal tunnel syndrome that was recalcitrant to conservative management  The decision was made to bring the patient to the operating room for right endoscopic carpal tunnel release  Risks of the procedure were explained which include, but are not limited to bleeding; infection; damage to nerves, arteries,veins, tendons; scar; pain; need for reoperation; failure to give desired result; and risks of anaesthesia  All questions were answered to satisfaction and they were willing to proceed  Operative Findings:  Right carpal tunnel syndrome    Complications:   None    Procedure and Technique:  After the patient, site, and procedure were identified, the patient was brought into the operating room in a supine position  Local anaesthesia was adminstered in the preoperative holding area  A tourniquet was not used  The  right upper extremity was then prepped and drapped in a normal, sterile, orthopedic fashion  After reconfirmation of the patient, site, and surgical procedure, which was agreed upon by the entire surgical team, attention was turned to the right wrist   The sites of the proximal and distal incisions were marked    The anoop of the proximal incision was placed horizontally at the midline of the wrist   The distal incision anoop was longitudinal extending distally from the point of intersection of the line between the long finger and ring finger and the line along the distal border of the fully abducted thumb  The proximal incision was performed  Subcutaneous tissues were dissected  Then the transverse volar antebrachial fascia was perforated with a scalpel  The edges of the skin incision where retracted and the forearm fascia was incised for approximately 1 5 cm proximally with care taken to identify and protect the median nerve  Retractors were used to inspect the transverse carpal ligament distally  A curved [place] Toro dissector was used to glide under the transverse carpal ligament and superficial to the median nerve with confirmation via the washboard feeling  Then the curved [place]o was pushed into the palm toward the distal incision site  When the location of the distal skin anoop was adequate, the distal incision was made  Then with retraction of the skin, further dissection and perforation of the palmar fascia was performed with the use of tenotomy scissors  The curved [place] Toro was guided from proximal to distal out the distal incisions without any twisting to allow for dilation of the tract  The curved Del Toro was removed, and the cannula for the camera was inserted along the same tract, making sure to keep the alignment post on the cannula perpendicular to the plane of the hand without twisting  Then while keeping the wrist in extension, and holding the cannula of the camera in place, the wrist was placed on the hyperextension board  The scope was inserted distally, and a cotton-tip applicator was used proximally to clean the tract as well as the scope  A curved cutting knife was introduced from proximal to distal while keeping visualization with the use of the camera  Without twisting of the canula, the knife was used to cut the transverse carpal ligament completely, making sure there were no remnant fibers  Then after this was accomplished, the hand was removed from the extension block    Three maneuvers were used to confirm the full release of the transverse carpal ligament  First, the ease of twisting the trocar of the camera confirmed the release of the ligament  Second, the curved [place] Toro was introduced to make sure there were no remnant fibers that could be felt palmarly  Third, the scope was introduced again to visualize that the whole ligament was released proximally to distally  Additional confirmation of full release included retraction and inspection in the distal and proximal incisions to make sure there were no remnant fibers distally or proximally respectively  At the completion of the procedure, hemostasis was obtained with cautery and direct pressure  The wounds were copiously irrigated with sterile solution  The wounds were closed with Prolene  Sterile dressings were applied, including Xeroform, gauze, tweeners, webril, ACE  Please note, all sponge, needle, and instrument counts were correct prior to closure  Loupe magnification was utilized  The patient tolerated the procedure well       I was present for all critical portions of the procedure, A qualified resident physician was not available and A physician assistant was required during the procedure for retraction tissue handling,dissection and suturing    Patient Disposition:  APU and hemodynamically stable    SIGNATURE: Sadia Flood MD  DATE: 02/10/22  TIME: 8:21 AM
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
## 2022-02-18 ENCOUNTER — OFFICE VISIT (OUTPATIENT)
Dept: OBGYN CLINIC | Facility: HOSPITAL | Age: 62
End: 2022-02-18

## 2022-02-18 VITALS
WEIGHT: 193 LBS | HEIGHT: 63 IN | HEART RATE: 74 BPM | SYSTOLIC BLOOD PRESSURE: 167 MMHG | BODY MASS INDEX: 34.2 KG/M2 | DIASTOLIC BLOOD PRESSURE: 110 MMHG

## 2022-02-18 DIAGNOSIS — Z48.89 AFTERCARE FOLLOWING SURGERY TO BODY SYSTEM: Primary | ICD-10-CM

## 2022-02-18 PROCEDURE — 99024 POSTOP FOLLOW-UP VISIT: CPT | Performed by: PHYSICIAN ASSISTANT

## 2022-02-18 NOTE — PROGRESS NOTES
S/p Right endoscopic carpal tunnel release done by Dr Camilo Wayne 2/10/2022  Today the patient states overall she is doing well and has had no issues with her incision  she states her wrist near the incision has been clicking but is not painful  She has been doing range of motion  She has not needed any pain medications    On examination today the incisions are clean dry intact, no erythema drainage or wound dehiscence  Skin edges are well approximated  sutures removed and Steri-Strips applied   Comfort Cool sleeve provided today  She can call us as needed for any issues in the future  Suture removal    Date/Time: 2/18/2022 9:40 AM  Performed by: Becka Holman PA-C  Authorized by: Becka Holman PA-C   Universal Protocol:  Consent: Verbal consent obtained  Consent given by: patient  Patient identity confirmed: verbally with patient        Patient location:  Clinic  Location:     Laterality:  Right    Location:  Upper extremity    Upper extremity location:  Wrist    Wrist location:  R wrist  Procedure details: Tools used:  Suture removal kit    Wound appearance:  No sign(s) of infection, good wound healing and clean  Post-procedure details:     Post-removal:  Steri-Strips applied    Patient tolerance of procedure:   Tolerated well, no immediate complications

## 2022-02-23 ENCOUNTER — OFFICE VISIT (OUTPATIENT)
Dept: FAMILY MEDICINE CLINIC | Facility: CLINIC | Age: 62
End: 2022-02-23
Payer: COMMERCIAL

## 2022-02-23 VITALS
DIASTOLIC BLOOD PRESSURE: 88 MMHG | SYSTOLIC BLOOD PRESSURE: 138 MMHG | WEIGHT: 195.6 LBS | OXYGEN SATURATION: 98 % | HEIGHT: 63 IN | BODY MASS INDEX: 34.66 KG/M2 | TEMPERATURE: 98 F | HEART RATE: 75 BPM

## 2022-02-23 DIAGNOSIS — R42 VERTIGO: ICD-10-CM

## 2022-02-23 DIAGNOSIS — Z12.31 ENCOUNTER FOR SCREENING MAMMOGRAM FOR MALIGNANT NEOPLASM OF BREAST: ICD-10-CM

## 2022-02-23 DIAGNOSIS — Z12.11 COLON CANCER SCREENING: ICD-10-CM

## 2022-02-23 DIAGNOSIS — N39.0 URINARY TRACT INFECTION WITHOUT HEMATURIA, SITE UNSPECIFIED: ICD-10-CM

## 2022-02-23 DIAGNOSIS — R10.30 LOWER ABDOMINAL PAIN: Primary | ICD-10-CM

## 2022-02-23 DIAGNOSIS — D32.9 BENIGN MENINGIOMA (HCC): ICD-10-CM

## 2022-02-23 LAB
SL AMB  POCT GLUCOSE, UA: NORMAL
SL AMB LEUKOCYTE ESTERASE,UA: ABNORMAL
SL AMB POCT BILIRUBIN,UA: NEGATIVE
SL AMB POCT BLOOD,UA: ABNORMAL
SL AMB POCT CLARITY,UA: CLEAR
SL AMB POCT COLOR,UA: YELLOW
SL AMB POCT KETONES,UA: NEGATIVE
SL AMB POCT NITRITE,UA: NEGATIVE
SL AMB POCT PH,UA: 8
SL AMB POCT SPECIFIC GRAVITY,UA: 1.01
SL AMB POCT URINE PROTEIN: ABNORMAL
SL AMB POCT UROBILINOGEN: NORMAL

## 2022-02-23 PROCEDURE — 3008F BODY MASS INDEX DOCD: CPT | Performed by: FAMILY MEDICINE

## 2022-02-23 PROCEDURE — 81002 URINALYSIS NONAUTO W/O SCOPE: CPT | Performed by: FAMILY MEDICINE

## 2022-02-23 PROCEDURE — 99214 OFFICE O/P EST MOD 30 MIN: CPT | Performed by: FAMILY MEDICINE

## 2022-02-23 PROCEDURE — 1036F TOBACCO NON-USER: CPT | Performed by: FAMILY MEDICINE

## 2022-02-23 PROCEDURE — 3725F SCREEN DEPRESSION PERFORMED: CPT | Performed by: FAMILY MEDICINE

## 2022-02-23 RX ORDER — MECLIZINE HYDROCHLORIDE 25 MG/1
25 TABLET ORAL 3 TIMES DAILY PRN
Qty: 30 TABLET | Refills: 0 | Status: SHIPPED | OUTPATIENT
Start: 2022-02-23 | End: 2022-04-28 | Stop reason: CLARIF

## 2022-02-23 RX ORDER — CIPROFLOXACIN 500 MG/1
500 TABLET, FILM COATED ORAL EVERY 12 HOURS SCHEDULED
Qty: 10 TABLET | Refills: 0 | Status: SHIPPED | OUTPATIENT
Start: 2022-02-23 | End: 2022-02-28

## 2022-02-23 RX ORDER — CIPROFLOXACIN 500 MG/1
500 TABLET, FILM COATED ORAL EVERY 12 HOURS SCHEDULED
Qty: 10 TABLET | Refills: 0 | Status: SHIPPED | OUTPATIENT
Start: 2022-02-23 | End: 2022-02-23 | Stop reason: SDUPTHER

## 2022-02-23 RX ORDER — MECLIZINE HYDROCHLORIDE 25 MG/1
25 TABLET ORAL 3 TIMES DAILY PRN
Qty: 30 TABLET | Refills: 0 | Status: SHIPPED | OUTPATIENT
Start: 2022-02-23

## 2022-02-23 NOTE — PROGRESS NOTES
Assessment/Plan:    Considering patient's history of meningioma resection recommend repeat MRI  It has been over a year since she had this done  Recommend starting meclizine for dizziness as needed in the meantime  She also likely has bladder infection  Await urine culture results  Recommend starting Cipro 500 mg b i d  Kaycee Simple 1  Lower abdominal pain  -     POCT urine dip  -     Urine culture  -     Basic metabolic panel; Future    2  Colon cancer screening  -     Ambulatory referral for colonoscopy; Future    3  Vertigo  -     MRI brain w wo contrast; Future; Expected date: 02/23/2022  -     meclizine (ANTIVERT) 25 mg tablet; Take 1 tablet (25 mg total) by mouth 3 (three) times a day as needed for dizziness  -     Ambulatory referral to Neurology; Future  -     Ambulatory Referral to Physical Therapy; Future  -     meclizine (ANTIVERT) 25 mg tablet; Take 1 tablet (25 mg total) by mouth 3 (three) times a day as needed for dizziness    4  Benign meningioma ([address] )  -     MRI brain w wo contrast; Future; Expected date: 02/23/2022  -     Ambulatory referral to Neurology; Future    5  Encounter for screening mammogram for malignant neoplasm of breast  -     Mammo screening bilateral w 3d & cad; Future; Expected date: 02/23/2022    6  Urinary tract infection without hematuria, site unspecified  -     ciprofloxacin (CIPRO) 500 mg tablet; Take 1 tablet (500 mg total) by mouth every 12 (twelve) hours for 5 days          Subjective:      Patient ID: Brittani Soriano is a 63 y o  female  Patient here with multiple concerns  She does have chronic vertigo  She had meningioma removed in the past   She has not had follow-up MRI  Vertigo comes and goes intermittently over the last several months and is purely positional   Denies any headaches or diplopia  She also over the last 1 week has lower mid abdominal cramping and discomfort    Mild dysuria           The following portions of the patient's history were reviewed and updated as appropriate: allergies, current medications, past family history, past medical history, past social history, past surgical history, and problem list     Review of Systems   Constitutional: Negative  HENT: Negative  Eyes: Negative  Respiratory: Negative  Cardiovascular: Negative  Gastrointestinal: Positive for abdominal pain  Endocrine: Negative  Genitourinary: Negative  Musculoskeletal: Negative  Skin: Negative  Allergic/Immunologic: Negative  Neurological: Positive for dizziness  Hematological: Negative  Psychiatric/Behavioral: Negative  Objective:      /88 (BP Location: Left arm, Patient Position: Sitting, Cuff Size: Standard)   Pulse 75   Temp 98 °F (36 7 °C) (Skin)   Ht 5' 3" (1 6 m)   Wt 88 7 kg (195 lb 9 6 oz)   LMP  (LMP Unknown)   SpO2 98%   BMI 34 65 kg/m²          Physical Exam  Vitals reviewed  Constitutional:       Appearance: She is well-developed  HENT:      Head: Normocephalic and atraumatic  Right Ear: External ear normal  Tympanic membrane is not erythematous or bulging  Left Ear: External ear normal  Tympanic membrane is not erythematous or bulging  Nose: Nose normal       Mouth/Throat:      Mouth: No oral lesions  Pharynx: No oropharyngeal exudate  Eyes:      General: No scleral icterus  Right eye: No discharge  Left eye: No discharge  Conjunctiva/sclera: Conjunctivae normal    Neck:      Thyroid: No thyromegaly  Cardiovascular:      Rate and Rhythm: Normal rate and regular rhythm  Heart sounds: Normal heart sounds  No murmur heard  No friction rub  No gallop  Pulmonary:      Effort: Pulmonary effort is normal  No respiratory distress  Breath sounds: No wheezing or rales  Chest:      Chest wall: No tenderness  Abdominal:      General: Bowel sounds are normal  There is no distension  Palpations: Abdomen is soft  There is no mass  Tenderness:  There is no abdominal tenderness  There is no guarding or rebound  Musculoskeletal:         General: No tenderness or deformity  Normal range of motion  Cervical back: Normal range of motion and neck supple  Lymphadenopathy:      Cervical: No cervical adenopathy  Skin:     General: Skin is warm and dry  Coloration: Skin is not pale  Findings: No erythema or rash  Neurological:      Mental Status: She is alert and oriented to person, place, and time  Cranial Nerves: No cranial nerve deficit  Motor: No abnormal muscle tone  Coordination: Coordination normal       Deep Tendon Reflexes: Reflexes are normal and symmetric     Psychiatric:         Behavior: Behavior normal

## 2022-02-23 NOTE — PROGRESS NOTES
BMI Counseling: Body mass index is 34 65 kg/m²  The BMI is above normal  Nutrition recommendations include reducing portion sizes

## 2022-03-01 ENCOUNTER — HOSPITAL ENCOUNTER (OUTPATIENT)
Dept: MAMMOGRAPHY | Facility: MEDICAL CENTER | Age: 62
Discharge: HOME | End: 2022-03-01
Payer: COMMERCIAL

## 2022-03-01 VITALS — BODY MASS INDEX: 34.65 KG/M2 | WEIGHT: 195.55 LBS | HEIGHT: 63 IN

## 2022-03-01 DIAGNOSIS — Z12.31 ENCOUNTER FOR SCREENING MAMMOGRAM FOR MALIGNANT NEOPLASM OF BREAST: ICD-10-CM

## 2022-03-01 PROCEDURE — 77067 SCR MAMMO BI INCL CAD: CPT

## 2022-03-01 PROCEDURE — 77063 BREAST TOMOSYNTHESIS BI: CPT

## 2022-03-07 ENCOUNTER — TELEPHONE (OUTPATIENT)
Dept: OBGYN CLINIC | Facility: HOSPITAL | Age: 62
End: 2022-03-07

## 2022-03-07 NOTE — TELEPHONE ENCOUNTER
Dr Shameka Coughlin  RE:  RTW     Patient states she is going back to work on 3/10 and needs a RTW letter  Patient states you may put it in her MyChart

## 2022-03-09 DIAGNOSIS — R92.8 ABNORMALITY OF RIGHT BREAST ON SCREENING MAMMOGRAM: Primary | ICD-10-CM

## 2022-03-16 ENCOUNTER — HOSPITAL ENCOUNTER (OUTPATIENT)
Dept: MRI IMAGING | Facility: HOSPITAL | Age: 62
Discharge: HOME | End: 2022-03-16
Payer: COMMERCIAL

## 2022-03-16 DIAGNOSIS — D32.9 BENIGN MENINGIOMA (HCC): ICD-10-CM

## 2022-03-16 DIAGNOSIS — R42 VERTIGO: ICD-10-CM

## 2022-03-16 PROCEDURE — 70553 MRI BRAIN STEM W/O & W/DYE: CPT

## 2022-03-16 PROCEDURE — G1004 CDSM NDSC: HCPCS

## 2022-03-16 PROCEDURE — A9585 GADOBUTROL INJECTION: HCPCS | Performed by: FAMILY MEDICINE

## 2022-03-16 RX ADMIN — GADOBUTROL 9 ML: 604.72 INJECTION INTRAVENOUS at 09:36

## 2022-03-21 ENCOUNTER — HOSPITAL ENCOUNTER (OUTPATIENT)
Dept: ULTRASOUND IMAGING | Facility: CLINIC | Age: 62
Discharge: HOME | End: 2022-03-21
Payer: COMMERCIAL

## 2022-03-21 ENCOUNTER — HOSPITAL ENCOUNTER (OUTPATIENT)
Dept: MAMMOGRAPHY | Facility: CLINIC | Age: 62
Discharge: HOME | End: 2022-03-21
Payer: COMMERCIAL

## 2022-03-21 VITALS — HEIGHT: 63 IN | WEIGHT: 195.55 LBS | BODY MASS INDEX: 34.65 KG/M2

## 2022-03-21 DIAGNOSIS — R92.8 ABNORMAL SCREENING MAMMOGRAM: ICD-10-CM

## 2022-03-21 PROCEDURE — 77065 DX MAMMO INCL CAD UNI: CPT

## 2022-03-21 PROCEDURE — 76642 ULTRASOUND BREAST LIMITED: CPT

## 2022-03-21 PROCEDURE — G0279 TOMOSYNTHESIS, MAMMO: HCPCS

## 2022-03-21 NOTE — PROGRESS NOTES
Met with patient and Dr Olga Carrillo  regarding recommendation for;    ___X__ RIGHT ______LEFT      __X___Ultrasound guided (x2) (1BR/1LN)  ______Stereotactic breast biopsy  __X___Verbalized understanding        Blood thinners:  No: ___X__ Yes: ______ What:                 Biopsy teaching sheet given:  Yes: ___X___ No: ________    Pt given contact information and adv to call with any questions/needs

## 2022-04-08 ENCOUNTER — CLINICAL SUPPORT (OUTPATIENT)
Dept: FAMILY MEDICINE CLINIC | Facility: CLINIC | Age: 62
End: 2022-04-08

## 2022-04-08 ENCOUNTER — HOSPITAL ENCOUNTER (OUTPATIENT)
Dept: MAMMOGRAPHY | Facility: CLINIC | Age: 62
Discharge: HOME | End: 2022-04-08
Payer: COMMERCIAL

## 2022-04-08 ENCOUNTER — HOSPITAL ENCOUNTER (OUTPATIENT)
Dept: ULTRASOUND IMAGING | Facility: CLINIC | Age: 62
Discharge: HOME | End: 2022-04-08
Payer: COMMERCIAL

## 2022-04-08 VITALS
TEMPERATURE: 97.4 F | OXYGEN SATURATION: 99 % | DIASTOLIC BLOOD PRESSURE: 102 MMHG | SYSTOLIC BLOOD PRESSURE: 170 MMHG | HEART RATE: 77 BPM

## 2022-04-08 VITALS
DIASTOLIC BLOOD PRESSURE: 110 MMHG | HEART RATE: 64 BPM | BODY MASS INDEX: 34.55 KG/M2 | SYSTOLIC BLOOD PRESSURE: 180 MMHG | HEIGHT: 63 IN | WEIGHT: 195 LBS

## 2022-04-08 DIAGNOSIS — Z01.30 BLOOD PRESSURE CHECK: Primary | ICD-10-CM

## 2022-04-08 DIAGNOSIS — R92.8 ABNORMAL MAMMOGRAM: ICD-10-CM

## 2022-04-08 PROCEDURE — 76942 ECHO GUIDE FOR BIOPSY: CPT

## 2022-04-08 PROCEDURE — 19083 BX BREAST 1ST LESION US IMAG: CPT

## 2022-04-08 PROCEDURE — 88342 IMHCHEM/IMCYTCHM 1ST ANTB: CPT | Performed by: PATHOLOGY

## 2022-04-08 PROCEDURE — A4648 IMPLANTABLE TISSUE MARKER: HCPCS

## 2022-04-08 PROCEDURE — 88341 IMHCHEM/IMCYTCHM EA ADD ANTB: CPT | Performed by: PATHOLOGY

## 2022-04-08 PROCEDURE — 88305 TISSUE EXAM BY PATHOLOGIST: CPT | Performed by: PATHOLOGY

## 2022-04-08 PROCEDURE — 38505 NEEDLE BIOPSY LYMPH NODES: CPT

## 2022-04-08 PROCEDURE — 88361 TUMOR IMMUNOHISTOCHEM/COMPUT: CPT | Performed by: PATHOLOGY

## 2022-04-08 PROCEDURE — 19285 PERQ DEV BREAST 1ST US IMAG: CPT

## 2022-04-08 RX ORDER — LIDOCAINE HYDROCHLORIDE 10 MG/ML
5 INJECTION, SOLUTION EPIDURAL; INFILTRATION; INTRACAUDAL; PERINEURAL ONCE
Status: COMPLETED | OUTPATIENT
Start: 2022-04-08 | End: 2022-04-08

## 2022-04-08 RX ADMIN — LIDOCAINE HYDROCHLORIDE 5 ML: 10 INJECTION, SOLUTION EPIDURAL; INFILTRATION; INTRACAUDAL; PERINEURAL at 10:33

## 2022-04-08 RX ADMIN — LIDOCAINE HYDROCHLORIDE 7 ML: 10 INJECTION, SOLUTION EPIDURAL; INFILTRATION; INTRACAUDAL; PERINEURAL at 10:37

## 2022-04-08 NOTE — DISCHARGE INSTR - OTHER ORDERS
POST LARGE CORE BREAST BIOPSY PATIENT INFORMATION      Place an ice pack inside your bra over the top of the dressing every hour for 20 minutes (20 minutes on, 60 minutes off)  Do this until bedtime  Do not shower or bathe until the following morning  After bathing, you may remove the bandaid  You may bathe your breast carefully with the steri-strips in place  Be careful not to loosen them  The steri-strips will fall off in 3-5 days  You may have mild discomfort, and you may have some bruising where the needle entered the skin  This should clear within 5-7 days  If you need medicine for discomfort, take acetaminophen products such as Tylenol  You may also take Advil or Motrin products  DO NOT use Aspirin for the first 24 hours  Do not participate in strenuous activities such as-tennis, aerobics, weight lifting, etc  for 24 hours  Refrain from swimming/soaking for 72 hours  Wearing a bra for sleeping may be more comfortable for the first 24-48 hours after your biopsy  Watch for continued bleeding, pain or fever over 101  If any of these symptoms occur, please contact our breast nurse navigator at the location where your biopsy was performed  During normal business hours (7:30am - 4:00pm) please call the nurse navigator at the site     where your procedure was performed:       [address] [facility] [address]: [phone] or      [address]: [phone] or [phone]     [address]: [address] [facility]: [address]: [phone]        After 4pm - please call your physician or go to the nearest Emergency Department location  The final results of your biopsy are usually available within one week

## 2022-04-08 NOTE — PROGRESS NOTES
Ice pack given:    ___X__yes _____no    Discharge instructions signed by patient:    ___X__yes _____no    Discharge instructions given to patient:    __X___yes _____no    Discharged via:    __X___ambulatory    _____wheelchair    _____stretcher    Stable on discharge:    _X____yes; although on multiple occasions that the patient's BP was taken was it in very high numbers for both the systolic and diastolic  We used both arms, two different manual blood pressure cuffs, and gave five to ten min between each blood pressure reading taken  Patient consistently had a high blood pressure  Please see nurse, Terrie Medina note  The patient's face was flushed  She declined water or a snack when I offered it to her twice  Patient stated that she felt, "fine " Patient declined having someone drive her when discharged from our office  She declined to be transported by ambulance to the hospital  She wanted to drive herself to her Dr 's office in [facility], even though she said the office was 30-40 min away  We did advise her against driving herself but she was persistent in her decisions  Patient stated that if she was not feeling well enough she would drive herself to [facility] ER that is on her way to her house/ doctor's office she said  ____no      Patient's biopsy site at the Lymph node area was bruising right away and the breast site had some bruising but also felt lumpy  The radiologist stated that her post mammogram pictures looked great in regards to discharge  I explained to the patient that using ice on both those sites as provided in her discharge instructions would be very beneficial to help with any bruising or swelling  The Cure's Act was reviewed with the patient prior to discharge  The patient did not express any questions or concerns at that time

## 2022-04-08 NOTE — PROGRESS NOTES
I was asked by Cedric MERCADO to see Araceli Lozano regarding her elevated blood pressures while here at [facility]  BP's were taken on both arms manually  At aprox  1010, her left pressure was 198/110 and right pressure was 186/110  Her BP was taken a number of different times and at the lowest it was 160/100  Melisa denied visual changes, headache and any other symptoms  She stated she did take her BP med before leaving her house  She admitted to feeling anxious about the biopsy and appeared to be somewhat anxious  I relayed all of this to   [place] at Dr Perkins [place] office  She discussed this with a practitioner who stated that Araceli Lozano should come to the office upon leaving [facility]  The office is in [facility] however  I discussed with Araceli Lozano that The office would like her to come for appt with nurse but due to the distance, it would be best for her to be transported by ambulance to the [facility]'s ER due to that she may have a cardiac event or stroke with her BP continually so high  She stated that she had no one who could come and transport her  Araceli Lozano refused to go to ER and stated she will go out and "walk around and get some air, and then drive to her doctor's office  I advised against her "going for a walk"  She was adamant about her decision but agreed to call me when arriving at [address] [place] [address]  She was otherwise stable upon discharge and said she "felt ok"

## 2022-04-08 NOTE — PROGRESS NOTES
Procedure type:    __X___ultrasound guided _____stereotactic    Breast:    _____Left ___X__Right    Location: Site 1: RB 8 o'clock 6 cm FN                   Site 2:  Right axilla     Needle: Site 1: 12 gauge                   Site 2:  16 gauge     # of passes: Site 1: 5                       Site 2: 3    Clip: Site 1: kavita  reflector clip            Site 2:  Hydromark Open Coil Clip    Performed by: Dr Mariama Felix held for 5 minutes by: Oscar HYDE    Steri Strips:    _X____yes _____no    Band aid:    __X___yes_____no    Tape and guaze:    _____yes ___X__no    Tolerated procedure:    __X___yes _____no

## 2022-04-11 ENCOUNTER — TELEPHONE (OUTPATIENT)
Dept: FAMILY MEDICINE CLINIC | Facility: CLINIC | Age: 62
End: 2022-04-11

## 2022-04-11 NOTE — TELEPHONE ENCOUNTER
Patient called to inform us of her blood pressure readings for today  This morning she said it was at 151/99 and she just checked it now before she called and it was at 159/95  Please advise  Thank you

## 2022-04-11 NOTE — PROGRESS NOTES
Post procedure call completed 4/11/22 at 1126    Bleeding: _____yes __X___no    Pain: _____yes ___X___no    Redness/Swelling: ______yes ___X___no    Band aid removed: __X___yes _____no    Steri-Strips intact: ___X___yes _____no (discussed with patient to remove steri strips on day if they have not come off on their own)    Pt with no questions at this time, adv will call when results available, adv to call with any questions or concerns, has name/# for contact

## 2022-04-12 ENCOUNTER — TELEPHONE (OUTPATIENT)
Dept: MAMMOGRAPHY | Facility: CLINIC | Age: 62
End: 2022-04-12

## 2022-04-12 ENCOUNTER — DOCUMENTATION (OUTPATIENT)
Dept: HEMATOLOGY ONCOLOGY | Facility: CLINIC | Age: 62
End: 2022-04-12

## 2022-04-12 ENCOUNTER — TELEPHONE (OUTPATIENT)
Dept: HEMATOLOGY ONCOLOGY | Facility: CLINIC | Age: 62
End: 2022-04-12

## 2022-04-12 NOTE — TELEPHONE ENCOUNTER
I just wanted to inform you that the above patient was given her positive breast biopsy results today by Dr Rika Subramanian The patient was referred  to Dr Mitzi Rosales  Hope line notified and will reach out with appointment  If you have any questions or if I can be of any further assistance please let me know      Thank you,  Sonu Nazario, [facility]  Breast Nurse Navigator

## 2022-04-12 NOTE — TELEPHONE ENCOUNTER
Intake received  Insurance education outreach to follow    04/20/22   Per RTE pt has an active highmark plan that runs on a tory year   Effective 05/11/19  Deduct $500 met  Out of pocket $3500 met $625 Verified Results  (1) CBC/PLT/DIFF 12Apr2016 08:45AM Ariana Morton     Test Name Result Flag Reference   WBC COUNT 6 25 Thousand/uL  4 31-10 16   RBC COUNT 4 78 Million/uL  3 81-5 12   HEMOGLOBIN 13 1 g/dL  11 5-15 4   HEMATOCRIT 39 5 %  34 8-46  1   MCV 83 fL  82-98   MCH 27 4 pg  26 8-34 3   MCHC 33 2 g/dL  31 4-37 4   RDW 15 6 % H 11 6-15 1   MPV 9 2 fL  8 9-12 7   PLATELET COUNT 594 Thousands/uL  149-390   nRBC AUTOMATED 0 /100 WBCs     NEUTROPHILS RELATIVE PERCENT 68 %  43-75   LYMPHOCYTES RELATIVE PERCENT 24 %  14-44   MONOCYTES RELATIVE PERCENT 5 %  4-12   EOSINOPHILS RELATIVE PERCENT 2 %  0-6   BASOPHILS RELATIVE PERCENT 1 %  0-1   NEUTROPHILS ABSOLUTE COUNT 4 27 Thousands/µL  1 85-7 62   LYMPHOCYTES ABSOLUTE COUNT 1 47 Thousands/µL  0 60-4 47   MONOCYTES ABSOLUTE COUNT 0 33 Thousand/µL  0 17-1 22   EOSINOPHILS ABSOLUTE COUNT 0 14 Thousand/µL  0 00-0 61   BASOPHILS ABSOLUTE COUNT 0 03 Thousands/µL  0 00-0 10     (1) LIPID PANEL FASTING W DIRECT LDL REFLEX 12Apr2016 08:45AM Ariana Morton   Triglyceride:         Normal              <150 mg/dl       Borderline High    150-199 mg/dl       High               200-499 mg/dl       Very High          >499 mg/dl  Cholesterol:         Desirable        <200 mg/dl      Borderline High  200-239 mg/dl      High             >239 mg/dl  HDL Cholesterol:        High    >59 mg/dL      Low     <41 mg/dL  LDL Cholesterol:        Optimal          <100 mg/dl         Near Optimal     100-129 mg/dl        Above Optimal          Borderline High   130-159 mg/dl          High              160-189 mg/dl          Very High        >189 mg/dl  LDL CALCULATED:    This screening LDL is a calculated result  It does not have the accuracy of the Direct Measured LDL in the monitoring of patients with hyperlipidemia and/or statin therapy  Direct Measure LDL (STU504) must be ordered separately in these patients       Test Name Result Flag Reference   CHOLESTEROL 162 mg/dL     LDL CHOLESTEROL CALCULATED 99 mg/dL  0-100   TRIGLYCERIDES 53 mg/dL  <=150   Specimen collection should occur prior to N-Acetylcysteine or Metamizole administration due to the potential for falsely depressed results  HDL,DIRECT 52 mg/dL  40-60     (1) VITAMIN D 25-HYDROXY 12Apr2016 08:45AM Ariana Morton     Test Name Result Flag Reference   VIT D 25-HYDROX 31 8 ng/mL  30 0-100 0     (1) COMPREHENSIVE METABOLIC PANEL [id number] 68:74OD Ariana Morton   National Kidney Disease Education Program recommendations are as follows:  GFR calculation is accurate only with a steady state creatinine  Chronic Kidney disease less than 60 ml/min/1 73 sq  meters  Kidney failure less than 15 ml/min/1 73 sq  meters  Test Name Result Flag Reference   GLUCOSE,RANDM 99 mg/dL     If the patient is fasting, the ADA then defines impaired fasting glucose as > 100 mg/dL and diabetes as > or equal to 123 mg/dL  SODIUM 139 mmol/L  136-145   POTASSIUM 4 1 mmol/L  3 5-5 3   CHLORIDE 106 mmol/L  100-108   CARBON DIOXIDE 27 mmol/L  21-32   ANION GAP (CALC) 6 mmol/L  4-13   BLOOD UREA NITROGEN 9 mg/dL  5-25   CREATININE 0 76 mg/dL  0 60-1 30   Standardized to IDMS reference method   CALCIUM 8 2 mg/dL L 8 3-10 1   BILI, TOTAL 0 73 mg/dL  0 20-1 00   ALK PHOSPHATAS 68 U/L     ALT (SGPT) 28 U/L  12-78   AST(SGOT) 19 U/L  5-45   ALBUMIN 3 9 g/dL  3 5-5 0   TOTAL PROTEIN 7 2 g/dL  6 4-8 2   eGFR Non-African American      >60 0 ml/min/1 73sq m     (1) TSH WITH FT4 REFLEX 12Apr2016 08:45AM Ariana Morton   Patients undergoing fluorescein dye angiography may retain small amounts of fluorescein in the body for 48-72 hours post procedure  Samples containing fluorescein can produce falsely depressed TSH values  If the patient had this procedure,a specimen should be resubmitted post fluorescein clearance          The recommended reference ranges for TSH during pregnancy are as follows:  First trimester 0 1 to 2 5 uIU/mL  Second trimester  0 2 to 3 0 uIU/mL  Third trimester 0 3 to 3 0 uIU/m     Test Name Result Flag Reference   TSH 0 939 uIU/mL  0 358-3 740     US THYROID [id number] 10:10AM Ariana Morton     Test Name Result Flag Reference   US THYROID (Report)     THYROID ULTRASOUND     INDICATION: Follow-up nodules     COMPARISON: 4/26/2005     TECHNIQUE:  Ultrasound of the thyroid was performed with a high frequency linear transducer in transverse and sagittal planes including volumetric imaging sweeps as well as traditional still imaging technique  FINDINGS:   The thyroid echotexture is heterogeneous  Right gland: 3 3 x 3 0 x 7 7 cm  Right lower pole  3 0 x 3 2 x 4 9 cm  Solid isoechoic nodule  Smooth, well defined margins  No calcifications  Nodule is not taller than it is wide  This nodule has signficantly increased in size from prior where it measured 2 1 x 2 0 x 3 0 cm  An area    measured in the right upper pole is not distinct from the adjacent area of heterogeneity/nodule  Left gland: 1 6 x 1 9 x 6 1 cm  No solid nodules  Left midpole  0 9 x 0 9 x 1 3 cm  Purely cystic nodule  Smooth, well defined margins  No calcifications  Nodule is not taller than it is wide  Additional smaller cysts identified  Isthmus: 0 7 cm in AP dimension  No nodules  IMPRESSION:   There are multiple thyroid nodules as above  The following meet published criteria for recommending ultrasound guided biopsy if not already performed: The 4 9 cm right lower pole nodule  (CRITERIA: Low suspicion sonographic pattern, >/= 1 5 cm though significant interval growth)  Reference: 2015 American Thyroid Association Management Guidelines for Adult Patients with Thyroid Nodules and Differentiated Thyroid Cancer  Thyroid, Volume 26, Number 1, 2016         Workstation performed: [id number]     Signed by:   Levon Smart MD   4/12/16

## 2022-04-12 NOTE — TELEPHONE ENCOUNTER
Soft Intake Form   Patient Details   Burt Wilson     1960     Reason For Appointment   Who is Calling? Patient   If not patient, Name?  n/a   DID YOU CONFIRM INSURANCE WITH PATIENT? Yes    Who is the Referring Doctor? [facility]    What is the diagnosis? invasive ductal carcinoma   Has this diagnosis been confirmed by a biopsy/surgery? If yes, what is the date it was done? Yes, 4/8/22   Biopsy done at [place] [address]? If not, where was it done? yes   Was imaging done, and was it done at [address] [place] [address]? If not, where was it done? Yes, [facility]   Have you been seen by another Oncologist?  If so, who and where (name of facility, city and state) yes   For 2nd Opinions Only: Are you currently undergoing treatment, or are you scheduled to start treatment? If yes, name of facility, city and state n/a    For "History Of" only: Have you completed treatment? n/a    Have you had Genetic Testing done in the past?  If so, advise to bring test results to their visit no   Record Gathering Information   Did you advise to have records faxed to [phone]? no   Did you advise to have disks sent to the proper address with imaging? ("History of" Patients)  5 years of imaging for breast patients-Mammos, US etc no   Scheduling Information   Did you send new patient paperwork? Email or mail? Yes , E-Mail    What is the best call back number?    (If the [facility] is calling, please use their number) [phone]   Miscellaneous Information   Scheduled appointment 4/19/22 8:00 am   Edvin Tabares

## 2022-04-12 NOTE — TELEPHONE ENCOUNTER
[facility] AT [facility] Line Surgical Oncology Referral    Diagnosis:invasive ductal carcinoma      Is this diagnosis cancer (Y/N):yes    Biopsy Date: 4/8/2022    Does the patient have another biopsy pending:no  If so, when:    Preferred provider:Dr Carlos Elizabeth    Preferred location:[place]    Any requests for dates/times: First available    Any additional information:     Please advise when appointment is made Yes

## 2022-04-12 NOTE — PROGRESS NOTES
Intake received- chart reviewed    Outside records needed?  No, Completed at the [facility]    Pathology complete? y    Imaging complete? y

## 2022-04-12 NOTE — TELEPHONE ENCOUNTER
Call placed to patient after pt given biopsy results from radiologist, questions answered, adv next step is to set patient up with surgeon, options discussed and patient wanted to have appt made with Dr Siri Gan, [facility] notified and will reach out with appointment

## 2022-04-12 NOTE — TELEPHONE ENCOUNTER
Intake received- chart reviewed    Outside records needed?  No, Completed at the [facility]    Pathology complete? y    Imaging complete? y

## 2022-04-14 ENCOUNTER — PATIENT OUTREACH (OUTPATIENT)
Dept: HEMATOLOGY ONCOLOGY | Facility: CLINIC | Age: 62
End: 2022-04-14

## 2022-04-14 ENCOUNTER — OFFICE VISIT (OUTPATIENT)
Dept: FAMILY MEDICINE CLINIC | Facility: CLINIC | Age: 62
End: 2022-04-14
Payer: COMMERCIAL

## 2022-04-14 VITALS
BODY MASS INDEX: 32.95 KG/M2 | OXYGEN SATURATION: 98 % | SYSTOLIC BLOOD PRESSURE: 146 MMHG | TEMPERATURE: 97.3 F | DIASTOLIC BLOOD PRESSURE: 94 MMHG | WEIGHT: 193 LBS | HEIGHT: 64 IN | HEART RATE: 70 BPM

## 2022-04-14 DIAGNOSIS — I10 PRIMARY HYPERTENSION: Primary | ICD-10-CM

## 2022-04-14 DIAGNOSIS — Z12.11 SCREENING FOR COLORECTAL CANCER: ICD-10-CM

## 2022-04-14 DIAGNOSIS — Z12.4 SCREENING FOR CERVICAL CANCER: ICD-10-CM

## 2022-04-14 DIAGNOSIS — Z11.4 SCREENING FOR HIV (HUMAN IMMUNODEFICIENCY VIRUS): ICD-10-CM

## 2022-04-14 DIAGNOSIS — I10 ESSENTIAL HYPERTENSION: ICD-10-CM

## 2022-04-14 DIAGNOSIS — Z12.12 SCREENING FOR COLORECTAL CANCER: ICD-10-CM

## 2022-04-14 DIAGNOSIS — C50.919 MALIGNANT NEOPLASM OF FEMALE BREAST, UNSPECIFIED ESTROGEN RECEPTOR STATUS, UNSPECIFIED LATERALITY, UNSPECIFIED SITE OF BREAST (HCC): ICD-10-CM

## 2022-04-14 PROCEDURE — 99214 OFFICE O/P EST MOD 30 MIN: CPT | Performed by: FAMILY MEDICINE

## 2022-04-14 RX ORDER — LOSARTAN POTASSIUM 100 MG/1
50 TABLET ORAL DAILY
Qty: 90 TABLET | Refills: 3 | Status: SHIPPED | OUTPATIENT
Start: 2022-04-14 | End: 2022-05-16 | Stop reason: SDUPTHER

## 2022-04-14 NOTE — PROGRESS NOTES
Breast Oncology Nurse Navigator    Called patient to introduce myself and my role as nurse navigator  Patient denies issues with transportation  Knows of her appointment with Dr Meg Lynn next week  Will bring her sister along with her  Has a supportive family  Declines need for OSW at this time  Was at her family doctor this morning for high BP  Her doctor increased her BP meds  Patient has three children and a supportive mother and siblings  Patient states her brother and two sisters have MS  Her mother had breast cancer  Patient has not had genetic testing  Offered referral to genetic testing  Patient wishes to wait for her consult with Dr Meg Lynn  Declines questions or needs at this time but has my phone number and knows to reach out with questions

## 2022-04-14 NOTE — PROGRESS NOTES
Assessment/Plan:     1  Primary hypertension  Assessment & Plan:  Patient's blood pressure is still uncontrolled  We will increase her losartan to 100 mg daily  She continues on hydrochlorothiazide 12 5 mg daily  Recommend that she continue to check blood pressure readings at home daily and log blood pressure readings  Recheck in office in 4 weeks  She will continue to track her blood pressures  2  Malignant neoplasm of female breast, unspecified estrogen receptor status, unspecified laterality, unspecified site of breast [facility])  Assessment & Plan:  Biopsy has just returned  She has follow-up with oncology group later this week to discuss treatment options and results  Staging has not been determined but lymph node biopsy was negative  We briefly discussed her diagnosis and she will call us if she has any concerns or questions either before or after she sees the oncologist       3  Screening for HIV (human immunodeficiency virus)  -     HIV 1/2 Antigen/Antibody (4th Generation) w Reflex [facility]; Future    4  Screening for cervical cancer    5  Screening for colorectal cancer    6  Essential hypertension  -     losartan (COZAAR) 100 MG tablet; Take 0 5 tablets (50 mg total) by mouth daily        Subjective:      Patient ID: Burt Wilson is a 63 y o  female  Patient is here for recheck on blood pressure  She has recent diagnosis of breast cancer and is scheduled to follow-up with Oncology  Her mother had history of breast cancer as well  She is here for blood pressure recheck  Blood pressures at home with been slightly high in the 509-336 systolic range           The following portions of the patient's history were reviewed and updated as appropriate: allergies, current medications, past family history, past medical history, past social history, past surgical history, and problem list     Review of Systems   Constitutional: Negative  HENT: Negative  Eyes: Negative  Respiratory: Negative  Cardiovascular: Negative  Gastrointestinal: Negative  Endocrine: Negative  Genitourinary: Negative  Musculoskeletal: Negative  Skin: Negative  Allergic/Immunologic: Negative  Neurological: Negative  Hematological: Negative  Psychiatric/Behavioral: Negative  Objective:      /94 (BP Location: Left arm, Patient Position: Sitting, Cuff Size: Standard)   Pulse 70   Temp (!) 97 3 °F (36 3 °C) (Skin)   Ht 5' 3 5" (1 613 m)   Wt 87 5 kg (193 lb)   LMP  (LMP Unknown)   SpO2 98%   BMI 33 65 kg/m²          Physical Exam  Vitals reviewed  Constitutional:       Appearance: She is well-developed  HENT:      Head: Normocephalic and atraumatic  Right Ear: External ear normal  Tympanic membrane is not erythematous or bulging  Left Ear: External ear normal  Tympanic membrane is not erythematous or bulging  Nose: Nose normal       Mouth/Throat:      Mouth: No oral lesions  Pharynx: No oropharyngeal exudate  Eyes:      General: No scleral icterus  Right eye: No discharge  Left eye: No discharge  Conjunctiva/sclera: Conjunctivae normal    Neck:      Thyroid: No thyromegaly  Cardiovascular:      Rate and Rhythm: Normal rate and regular rhythm  Heart sounds: Normal heart sounds  No murmur heard  No friction rub  No gallop  Pulmonary:      Effort: Pulmonary effort is normal  No respiratory distress  Breath sounds: No wheezing or rales  Chest:      Chest wall: No tenderness  Abdominal:      General: Bowel sounds are normal  There is no distension  Palpations: Abdomen is soft  There is no mass  Tenderness: There is no abdominal tenderness  There is no guarding or rebound  Musculoskeletal:         General: No tenderness or deformity  Normal range of motion  Cervical back: Normal range of motion and neck supple  Lymphadenopathy:      Cervical: No cervical adenopathy  Skin:     General: Skin is warm and dry  Coloration: Skin is not pale  Findings: No erythema or rash  Neurological:      Mental Status: She is alert and oriented to person, place, and time  Cranial Nerves: No cranial nerve deficit  Motor: No abnormal muscle tone  Coordination: Coordination normal       Deep Tendon Reflexes: Reflexes are normal and symmetric     Psychiatric:         Behavior: Behavior normal

## 2022-04-14 NOTE — ASSESSMENT & PLAN NOTE
Patient's blood pressure is still uncontrolled  We will increase her losartan to 100 mg daily  She continues on hydrochlorothiazide 12 5 mg daily  Recommend that she continue to check blood pressure readings at home daily and log blood pressure readings  Recheck in office in 4 weeks  She will continue to track her blood pressures

## 2022-04-14 NOTE — ASSESSMENT & PLAN NOTE
Biopsy has just returned  She has follow-up with oncology group later this week to discuss treatment options and results  Staging has not been determined but lymph node biopsy was negative    We briefly discussed her diagnosis and she will call us if she has any concerns or questions either before or after she sees the oncologist

## 2022-04-15 ENCOUNTER — TELEPHONE (OUTPATIENT)
Dept: SURGICAL ONCOLOGY | Facility: CLINIC | Age: 62
End: 2022-04-15

## 2022-04-15 NOTE — TELEPHONE ENCOUNTER
 Hello, can I please speak to (patient name) this is (enter your name here) calling from [facility]) to remind you of your appointment on (4/19 8am) at [facility]  I am calling to review our no-show/cancelation policy and masking policy  Do you have a few minutes? We ask that you come at least 15 minutes early for your appointment to complete all paperwork  However, If you are up to 20 minutes late for your appointment, we may need to reschedule you  Any lateness to an appointment may result in an shorted visit, for our providers to offer you the most out of your consult, please arrive early  We require at least 24-hour notice for cancelations and if you miss your appointment 3 times, we may unfortunately not be able to reschedule any future visits  We ask that you please call our office in the event you are feeling ill as we may need to reschedule your appointment  You are allowed to bring only one visitor with you to your appointment, if your visitor is not feeling well we ask that you don't bring them  Our current masking policy is: if you are vaccinated masking is optional, if you are unvaccinated masking is required  We look forward to seeing you at your upcoming appointment! LVM to confirm  Left call back number

## 2022-04-19 ENCOUNTER — CONSULT (OUTPATIENT)
Dept: SURGICAL ONCOLOGY | Facility: CLINIC | Age: 62
End: 2022-04-19
Payer: COMMERCIAL

## 2022-04-19 ENCOUNTER — TELEPHONE (OUTPATIENT)
Dept: GENETICS | Facility: CLINIC | Age: 62
End: 2022-04-19

## 2022-04-19 ENCOUNTER — CLINICAL SUPPORT (OUTPATIENT)
Dept: GENETICS | Facility: CLINIC | Age: 62
End: 2022-04-19

## 2022-04-19 ENCOUNTER — APPOINTMENT (OUTPATIENT)
Dept: LAB | Facility: MEDICAL CENTER | Age: 62
End: 2022-04-19
Payer: COMMERCIAL

## 2022-04-19 VITALS
SYSTOLIC BLOOD PRESSURE: 166 MMHG | BODY MASS INDEX: 32.95 KG/M2 | RESPIRATION RATE: 17 BRPM | DIASTOLIC BLOOD PRESSURE: 92 MMHG | TEMPERATURE: 98.6 F | HEIGHT: 64 IN | OXYGEN SATURATION: 97 % | WEIGHT: 193 LBS | HEART RATE: 75 BPM

## 2022-04-19 DIAGNOSIS — C50.511 MALIGNANT NEOPLASM OF LOWER-OUTER QUADRANT OF RIGHT BREAST OF FEMALE, ESTROGEN RECEPTOR POSITIVE (HCC): ICD-10-CM

## 2022-04-19 DIAGNOSIS — Z17.0 MALIGNANT NEOPLASM OF LOWER-OUTER QUADRANT OF RIGHT BREAST OF FEMALE, ESTROGEN RECEPTOR POSITIVE (HCC): Primary | ICD-10-CM

## 2022-04-19 DIAGNOSIS — C50.511 MALIGNANT NEOPLASM OF LOWER-OUTER QUADRANT OF RIGHT FEMALE BREAST, UNSPECIFIED ESTROGEN RECEPTOR STATUS (HCC): ICD-10-CM

## 2022-04-19 DIAGNOSIS — Z80.3 FAMILY HISTORY OF BREAST CANCER IN MOTHER: ICD-10-CM

## 2022-04-19 DIAGNOSIS — Z17.0 ESTROGEN RECEPTOR POSITIVE: ICD-10-CM

## 2022-04-19 DIAGNOSIS — Z80.0 FAMILY HISTORY OF STOMACH CANCER: ICD-10-CM

## 2022-04-19 DIAGNOSIS — Z80.0 FAMILY HISTORY OF MALIGNANT NEOPLASM OF GASTROINTESTINAL TRACT: ICD-10-CM

## 2022-04-19 DIAGNOSIS — C50.911 MALIGNANT NEOPLASM OF RIGHT FEMALE BREAST, UNSPECIFIED ESTROGEN RECEPTOR STATUS, UNSPECIFIED SITE OF BREAST (HCC): Primary | ICD-10-CM

## 2022-04-19 DIAGNOSIS — C50.511 MALIGNANT NEOPLASM OF LOWER-OUTER QUADRANT OF RIGHT BREAST OF FEMALE, ESTROGEN RECEPTOR POSITIVE (HCC): Primary | ICD-10-CM

## 2022-04-19 DIAGNOSIS — Z17.0 MALIGNANT NEOPLASM OF LOWER-OUTER QUADRANT OF RIGHT BREAST OF FEMALE, ESTROGEN RECEPTOR POSITIVE (HCC): ICD-10-CM

## 2022-04-19 DIAGNOSIS — Z80.3 FAMILY HISTORY OF MALIGNANT NEOPLASM OF BREAST: ICD-10-CM

## 2022-04-19 DIAGNOSIS — Z80.3 FAMILY HISTORY OF BREAST CANCER: ICD-10-CM

## 2022-04-19 PROCEDURE — 36415 COLL VENOUS BLD VENIPUNCTURE: CPT

## 2022-04-19 PROCEDURE — NC001 PR NO CHARGE: Performed by: GENETIC COUNSELOR, MS

## 2022-04-19 PROCEDURE — 99205 OFFICE O/P NEW HI 60 MIN: CPT | Performed by: SURGERY
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
## 2022-04-20 ENCOUNTER — PATIENT OUTREACH (OUTPATIENT)
Dept: CASE MANAGEMENT | Facility: OTHER | Age: 62
End: 2022-04-20

## 2022-04-20 NOTE — PROGRESS NOTES
Biopsychosocial and Barriers Assessment    Cancer Diagnosis: Breast cancer  Home/Cell Phone: [phone]  Emergency Contact: Miguel Rodriguez (daughter)  Marital Status:   Interpretation concerns, speaks another language, preferred language: English  Cultural concerns: none identified  Ability to read or write: yes    Caregiver/Support: 3 adult children, mother and sister  Children: 3  Child/Elder care: none    Housing: [employer]  Home Setup: multistory  Lives With: alone  Daily Living Activities: independent  [address]: none  Ambulation: independent    Preferred Pharmacy: [facility] - [place]  High co-pays with insurance:  Specialist $40; ER $150  High co-pays with medication coverage: not known  No medication coverage: N/A    Primary Care Provider: Rodo Zarate DO  Hx of Home Health Care: none  Hx of Short term rehab: none  Mental Health Hx: none  Substance Abuse Hx: none  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  [place] Status/Location: not addressed  Ability to pay bills: yes  POA/LW/AD: not addressed  Transportation Plan/Concerns: drives self, family can also assist      What do you know about your Cancer Diagnosis    What has your doctor told you about your cancer diagnosis: I was diagnosed with breast cancer    What has your doctor told you about your cancer treatment: I just had my genetic testing and will go back to see Dr Jorgito Morse to talk about surgery  What specific concerns do you have about your diagnosis and treatment: I am hoping I may be able to have IORT at the same time as my surgery  Have you been made aware of any hair loss associated with treatment: not addressed    Additional Comments:    Oncology LSW received pt's completed distress thermometer in which pt self scored a 7/10 and noted fears, nervousness, sadness, worry, appearance, constipation, and diarrhea as physical/emotional/psychosocial concerns    Pt has been diagnosed with breast cancer  Supportive outreach call placed today  Introduced self and role  Frandy Farah stated she is doing ok, but her diagnosis is "a lot to take in " Discussed feelings of being overwhelmed and shocked are normal  She stated she has excellent support from her mother, her sister and 3 children  She is   Frandy Farah works 2nd shift and stated she does not have short term disability, only sick days  She recently had wrist surgery in February so she has used up some of her sick time  She denies any immediate SW needs at this time  OSW offered to provide direct contact information, however she just laid down to sleep  OSW offered to email information and she was agreeable    Sent contact information to Frandy Farah at [email or web address]

## 2022-04-22 ENCOUNTER — TELEPHONE (OUTPATIENT)
Dept: OTHER | Facility: OTHER | Age: 62
End: 2022-04-22

## 2022-04-22 ENCOUNTER — TELEMEDICINE (OUTPATIENT)
Dept: FAMILY MEDICINE CLINIC | Facility: CLINIC | Age: 62
End: 2022-04-22
Payer: COMMERCIAL

## 2022-04-22 DIAGNOSIS — J40 BRONCHITIS: ICD-10-CM

## 2022-04-22 DIAGNOSIS — Z11.59 SCREENING FOR VIRAL DISEASE: Primary | ICD-10-CM

## 2022-04-22 PROCEDURE — 87636 SARSCOV2 & INF A&B AMP PRB: CPT | Performed by: FAMILY MEDICINE

## 2022-04-22 PROCEDURE — 99213 OFFICE O/P EST LOW 20 MIN: CPT | Performed by: FAMILY MEDICINE

## 2022-04-22 RX ORDER — AZITHROMYCIN 250 MG/1
TABLET, FILM COATED ORAL
Qty: 6 TABLET | Refills: 0 | Status: SHIPPED | OUTPATIENT
Start: 2022-04-22 | End: 2022-04-29
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
## 2022-04-23 LAB
FLUAV RNA RESP QL NAA+PROBE: NEGATIVE
FLUBV RNA RESP QL NAA+PROBE: NEGATIVE
SARS-COV-2 RNA RESP QL NAA+PROBE: NEGATIVE

## 2022-04-28 ENCOUNTER — OFFICE VISIT (OUTPATIENT)
Dept: FAMILY MEDICINE CLINIC | Facility: CLINIC | Age: 62
End: 2022-04-28
Payer: COMMERCIAL

## 2022-04-28 VITALS
WEIGHT: 196.8 LBS | BODY MASS INDEX: 33.6 KG/M2 | RESPIRATION RATE: 16 BRPM | DIASTOLIC BLOOD PRESSURE: 78 MMHG | HEART RATE: 60 BPM | TEMPERATURE: 96.9 F | HEIGHT: 64 IN | SYSTOLIC BLOOD PRESSURE: 134 MMHG | OXYGEN SATURATION: 99 %

## 2022-04-28 DIAGNOSIS — Z17.0 MALIGNANT NEOPLASM OF LOWER-OUTER QUADRANT OF RIGHT BREAST OF FEMALE, ESTROGEN RECEPTOR POSITIVE (HCC): ICD-10-CM

## 2022-04-28 DIAGNOSIS — C50.511 MALIGNANT NEOPLASM OF LOWER-OUTER QUADRANT OF RIGHT BREAST OF FEMALE, ESTROGEN RECEPTOR POSITIVE (HCC): ICD-10-CM

## 2022-04-28 DIAGNOSIS — Z12.12 SCREENING FOR COLORECTAL CANCER: ICD-10-CM

## 2022-04-28 DIAGNOSIS — D32.9 BENIGN MENINGIOMA (HCC): ICD-10-CM

## 2022-04-28 DIAGNOSIS — E03.9 HYPOTHYROIDISM, UNSPECIFIED TYPE: ICD-10-CM

## 2022-04-28 DIAGNOSIS — R06.00 DYSPNEA, UNSPECIFIED TYPE: Primary | ICD-10-CM

## 2022-04-28 DIAGNOSIS — I10 PRIMARY HYPERTENSION: ICD-10-CM

## 2022-04-28 DIAGNOSIS — Z12.11 SCREENING FOR COLORECTAL CANCER: ICD-10-CM

## 2022-04-28 PROBLEM — Z48.89 AFTERCARE FOLLOWING SURGERY TO BODY SYSTEM: Status: RESOLVED | Noted: 2022-02-18 | Resolved: 2022-04-28

## 2022-04-28 PROBLEM — S61.452A DOG BITE, HAND, LEFT, INITIAL ENCOUNTER: Status: RESOLVED | Noted: 2020-11-04 | Resolved: 2022-04-28
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
## 2022-04-28 PROBLEM — L03.114 CELLULITIS OF LEFT HAND: Status: RESOLVED | Noted: 2020-11-04 | Resolved: 2022-04-28

## 2022-04-28 PROCEDURE — 3008F BODY MASS INDEX DOCD: CPT | Performed by: FAMILY MEDICINE

## 2022-04-28 PROCEDURE — 1036F TOBACCO NON-USER: CPT | Performed by: FAMILY MEDICINE

## 2022-04-28 PROCEDURE — 99214 OFFICE O/P EST MOD 30 MIN: CPT | Performed by: FAMILY MEDICINE

## 2022-04-28 PROCEDURE — 3078F DIAST BP <80 MM HG: CPT | Performed by: FAMILY MEDICINE

## 2022-04-28 PROCEDURE — 3075F SYST BP GE 130 - 139MM HG: CPT | Performed by: FAMILY MEDICINE

## 2022-04-28 PROCEDURE — 93000 ELECTROCARDIOGRAM COMPLETE: CPT | Performed by: FAMILY MEDICINE

## 2022-04-28 RX ORDER — ALBUTEROL SULFATE 90 UG/1
2 AEROSOL, METERED RESPIRATORY (INHALATION) EVERY 6 HOURS PRN
Qty: 18 G | Refills: 5 | Status: SHIPPED | OUTPATIENT
Start: 2022-04-28

## 2022-04-28 NOTE — PROGRESS NOTES
Assessment/Plan:  EKG appears normal   Await chest x-ray results  Recommend follow-up with Oncology as scheduled next week  Consider lab testing if symptoms persist   Consider pulmonary function testing if symptoms persist   She will call with any new persisting or worsening symptoms  1  Dyspnea, unspecified type  -     XR chest pa & lateral; Future; Expected date: 04/28/2022  -     POCT ECG  -     albuterol (Ventolin HFA) 90 mcg/act inhaler; Inhale 2 puffs every 6 (six) hours as needed for wheezing    2  Screening for colorectal cancer    3  Malignant neoplasm of lower-outer quadrant of right breast of female, estrogen receptor positive ([facility] [address] )    4  Benign meningioma (HCC)          Subjective:      Patient ID: Grace Wahl is a 63 y o  female  Patient with recent diagnosis of breast cancer is here today with some mild but improving dyspnea  She was sick with upper respiratory infection last week  No fever but she does note a lingering cough and exertional dyspnea  Denies any chest pain or palpitation  Cough is nonproductive  No orthopnea  No edema  The following portions of the patient's history were reviewed and updated as appropriate: allergies, current medications, past family history, past medical history, past social history, past surgical history, and problem list     Review of Systems   Constitutional: Negative  HENT: Negative  Eyes: Negative  Respiratory: Positive for cough and shortness of breath  Cardiovascular: Negative  Gastrointestinal: Negative  Endocrine: Negative  Genitourinary: Negative  Musculoskeletal: Negative  Skin: Negative  Allergic/Immunologic: Negative  Neurological: Negative  Hematological: Negative  Psychiatric/Behavioral: Negative            Objective:      /78 (BP Location: Left arm, Patient Position: Sitting, Cuff Size: Standard)   Pulse 60   Temp (!) 96 9 °F (36 1 °C) (Temporal)   Resp 16   Ht 5' 3 5" (1 613 m)   Wt 89 3 kg (196 lb 12 8 oz)   LMP  (LMP Unknown)   SpO2 99%   BMI 34 31 kg/m²          Physical Exam  Vitals reviewed  Constitutional:       Appearance: She is well-developed  HENT:      Head: Normocephalic and atraumatic  Right Ear: External ear normal  Tympanic membrane is not erythematous or bulging  Left Ear: External ear normal  Tympanic membrane is not erythematous or bulging  Nose: Nose normal       Mouth/Throat:      Mouth: No oral lesions  Pharynx: No oropharyngeal exudate  Eyes:      General: No scleral icterus  Right eye: No discharge  Left eye: No discharge  Conjunctiva/sclera: Conjunctivae normal    Neck:      Thyroid: No thyromegaly  Cardiovascular:      Rate and Rhythm: Normal rate and regular rhythm  Heart sounds: Normal heart sounds  No murmur heard  No friction rub  No gallop  Pulmonary:      Effort: Pulmonary effort is normal  No respiratory distress  Breath sounds: No wheezing or rales  Comments: Slightly diminished breath sounds at bilateral bases but no rales rhonchi or wheezes  Chest:      Chest wall: No tenderness  Abdominal:      General: Bowel sounds are normal  There is no distension  Palpations: Abdomen is soft  There is no mass  Tenderness: There is no abdominal tenderness  There is no guarding or rebound  Musculoskeletal:         General: No tenderness or deformity  Normal range of motion  Cervical back: Normal range of motion and neck supple  Lymphadenopathy:      Cervical: No cervical adenopathy  Skin:     General: Skin is warm and dry  Coloration: Skin is not pale  Findings: No erythema or rash  Neurological:      Mental Status: She is alert and oriented to person, place, and time  Cranial Nerves: No cranial nerve deficit  Motor: No abnormal muscle tone  Coordination: Coordination normal       Deep Tendon Reflexes: Reflexes are normal and symmetric  Psychiatric:         Behavior: Behavior normal

## 2022-05-02 ENCOUNTER — RADIATION ONCOLOGY CONSULT (OUTPATIENT)
Dept: RADIATION ONCOLOGY | Facility: CLINIC | Age: 62
End: 2022-05-02
Attending: RADIOLOGY
Payer: COMMERCIAL

## 2022-05-02 VITALS
RESPIRATION RATE: 16 BRPM | BODY MASS INDEX: 33.16 KG/M2 | OXYGEN SATURATION: 98 % | SYSTOLIC BLOOD PRESSURE: 142 MMHG | DIASTOLIC BLOOD PRESSURE: 90 MMHG | TEMPERATURE: 98.6 F | HEART RATE: 90 BPM | WEIGHT: 194.22 LBS | HEIGHT: 64 IN

## 2022-05-02 DIAGNOSIS — Z17.0 MALIGNANT NEOPLASM OF LOWER-OUTER QUADRANT OF RIGHT BREAST OF FEMALE, ESTROGEN RECEPTOR POSITIVE (HCC): Primary | ICD-10-CM

## 2022-05-02 DIAGNOSIS — C50.511 MALIGNANT NEOPLASM OF LOWER-OUTER QUADRANT OF RIGHT BREAST OF FEMALE, ESTROGEN RECEPTOR POSITIVE (HCC): Primary | ICD-10-CM

## 2022-05-02 PROCEDURE — G0463 HOSPITAL OUTPT CLINIC VISIT: HCPCS | Performed by: RADIOLOGY

## 2022-05-02 PROCEDURE — 99211 OFF/OP EST MAY X REQ PHY/QHP: CPT | Performed by: RADIOLOGY

## 2022-05-02 PROCEDURE — 99204 OFFICE O/P NEW MOD 45 MIN: CPT | Performed by: RADIOLOGY

## 2022-05-02 PROCEDURE — 77263 THER RADIOLOGY TX PLNG CPLX: CPT | Performed by: RADIOLOGY

## 2022-05-02 RX ORDER — FLUTICASONE PROPIONATE 50 MCG
1 SPRAY, SUSPENSION (ML) NASAL AS NEEDED
COMMUNITY
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
## 2022-05-02 NOTE — LETTER
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 have been created with voice recognition software  Occasional wrong word or "sound a like" substitutions may have occurred due to the inherent limitations of voice recognition software  Read the chart carefully and recognize, using context, where substitutions have occurred
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
## 2022-05-03 ENCOUNTER — TELEPHONE (OUTPATIENT)
Dept: SURGICAL ONCOLOGY | Facility: CLINIC | Age: 62
End: 2022-05-03

## 2022-05-03 ENCOUNTER — TELEPHONE (OUTPATIENT)
Dept: GENETICS | Facility: CLINIC | Age: 62
End: 2022-05-03

## 2022-05-03 NOTE — TELEPHONE ENCOUNTER
Called and offered either option for an appointment as instructed by Dr Trina Esteban and patient was agreeable to the [place] 5/16/22 at 1:45pm appointment  Dr Trina Esteban made aware   ----- Message from Ro Zepeda MD sent at 5/3/2022  3:59 PM EDT -----  We need to bring her back to set up surgery  We have 30 minutes on the 12th at [place]  If she won't drive there, then I guess use the 145 on the 16th

## 2022-05-03 NOTE — TELEPHONE ENCOUNTER
I spoke with Terrance Bailey to review the result of her STAT genetic test     Test: [facility] BRCAplus STAT Panel (8 genes): ELEANOR, BRCA1, BRCA2, CDH1, CHEK2, PALB2, PTEN, TP53    Initial results will take approximately 5-12 days to return     Additional results may take up to 2-3 weeks to complete once test is started  Result:   Negative - No Clinically Significant Variants Detected      Assessment:     Negative   A negative result significantly reduces the likelihood that Terrance Bailey has a hereditary cancer syndrome related to the high-risk breast cancer genes listed above  This result does not have surgical implications and surgical options should be discussed with her healthcare provider  Additional Testing: The reflex to [employer] (28 additional genes): APC, AXIN2 BARD1, BRIP1, BMPR1A, CDK4, CDKN2A, DICER1, EPCAM, GREM1, HOXB13, MLH1, MSH2, MSH3, MSH6, MUTYH, NBN, NF1, NTHL1, PMS2, POLD1, POLE, RAD51C, RAD51D, RECQL SMAD4, SMARCA4, STK11 test is pending  We will contact Terrance Lynn once results are available  Additional recommendations for surveillance/medical management will be made upon final genetic test result  We will mail a copy of the test result and consult note upon completion of the final result  Norma's surgeon was made aware of this test result

## 2022-05-03 NOTE — TELEPHONE ENCOUNTER
Patient called into [facility] to follow up on the status of her genetic test results, she indicated that when she met with the genetic counselor she was told that her results would come back 5-12 days back  She expressed that she is waiting for these results to make her surgical decision  I advised that based on the [facility] portal her results were expected on 4/30/2022 however results were still showing in progress, I explained that we will follow up with the lab and call her back with an update  I spoke with [place] at [facility], she confirmed that patient's results are still pending and are expected to be completed in 7-10 days, no exact dated provided nor note of the reason why there was a delay  She expressed that within a day or 2 they would get an update and a new expected date will appear on the portal      I called patient back to provide an update and explained to her that as soon as her results were available she would get a call from the genetic counselor

## 2022-05-05 ENCOUNTER — TELEPHONE (OUTPATIENT)
Dept: SURGICAL ONCOLOGY | Facility: CLINIC | Age: 62
End: 2022-05-05

## 2022-05-05 NOTE — TELEPHONE ENCOUNTER
Called and offered to move patients appointment up from 5/16/22 to 5/9/22 as Dr Marycarmen Worley had earlier opening  Patient declined as she unable due to her work schedule

## 2022-05-09 DIAGNOSIS — I10 ESSENTIAL HYPERTENSION: ICD-10-CM

## 2022-05-09 DIAGNOSIS — E03.9 HYPOTHYROIDISM, UNSPECIFIED TYPE: ICD-10-CM

## 2022-05-10 RX ORDER — HYDROCHLOROTHIAZIDE 12.5 MG/1
TABLET ORAL
Qty: 90 TABLET | Refills: 3 | Status: SHIPPED | OUTPATIENT
Start: 2022-05-10

## 2022-05-10 RX ORDER — LEVOTHYROXINE SODIUM 0.05 MG/1
TABLET ORAL
Qty: 90 TABLET | Refills: 3 | Status: SHIPPED | OUTPATIENT
Start: 2022-05-10

## 2022-05-13 ENCOUNTER — TELEPHONE (OUTPATIENT)
Dept: SURGICAL ONCOLOGY | Facility: CLINIC | Age: 62
End: 2022-05-13

## 2022-05-13 NOTE — TELEPHONE ENCOUNTER
Called and left voicemail stating, Dr Burton had a personal emergency, we will need to reschedule your upcoming appointment on Monday, 5/16  Rescheduled appt to 5/25/22 at 11:30am at [facility] office per [facility]  Left call back number and asked her to confirm new appt

## 2022-05-16 ENCOUNTER — OFFICE VISIT (OUTPATIENT)
Dept: FAMILY MEDICINE CLINIC | Facility: CLINIC | Age: 62
End: 2022-05-16
Payer: COMMERCIAL

## 2022-05-16 VITALS
BODY MASS INDEX: 35.33 KG/M2 | HEART RATE: 68 BPM | DIASTOLIC BLOOD PRESSURE: 76 MMHG | OXYGEN SATURATION: 98 % | HEIGHT: 63 IN | SYSTOLIC BLOOD PRESSURE: 130 MMHG | WEIGHT: 199.4 LBS | TEMPERATURE: 98 F

## 2022-05-16 DIAGNOSIS — Z12.11 SCREENING FOR COLORECTAL CANCER: ICD-10-CM

## 2022-05-16 DIAGNOSIS — I10 PRIMARY HYPERTENSION: Primary | ICD-10-CM

## 2022-05-16 DIAGNOSIS — Z12.12 SCREENING FOR COLORECTAL CANCER: ICD-10-CM

## 2022-05-16 DIAGNOSIS — Z13.820 SCREENING FOR OSTEOPOROSIS: ICD-10-CM

## 2022-05-16 DIAGNOSIS — I10 ESSENTIAL HYPERTENSION: ICD-10-CM

## 2022-05-16 DIAGNOSIS — C50.511 MALIGNANT NEOPLASM OF LOWER-OUTER QUADRANT OF RIGHT BREAST OF FEMALE, ESTROGEN RECEPTOR POSITIVE (HCC): ICD-10-CM

## 2022-05-16 DIAGNOSIS — Z17.0 MALIGNANT NEOPLASM OF LOWER-OUTER QUADRANT OF RIGHT BREAST OF FEMALE, ESTROGEN RECEPTOR POSITIVE (HCC): ICD-10-CM

## 2022-05-16 PROCEDURE — 99214 OFFICE O/P EST MOD 30 MIN: CPT | Performed by: FAMILY MEDICINE

## 2022-05-16 RX ORDER — LOSARTAN POTASSIUM 100 MG/1
100 TABLET ORAL DAILY
Qty: 90 TABLET | Refills: 3 | Status: SHIPPED | OUTPATIENT
Start: 2022-05-16

## 2022-05-16 NOTE — ASSESSMENT & PLAN NOTE
Blood pressure is much improved on losartan 100 mg daily and hydrochlorothiazide 12 5 mg daily  Recommend recheck again in 3 months

## 2022-05-16 NOTE — PROGRESS NOTES
Assessment/Plan:  Patient will return to office in 3 months  She is continuing to follow with Oncology for upcoming treatment for breast cancer  1  Primary hypertension  Assessment & Plan:  Blood pressure is much improved on losartan 100 mg daily and hydrochlorothiazide 12 5 mg daily  Recommend recheck again in 3 months  2  Screening for colorectal cancer  -     Ambulatory referral for colonoscopy; Future    3  Screening for osteoporosis  -     DXA bone density spine hip and pelvis; Future; Expected date: 05/16/2022    4  Essential hypertension  -     losartan (COZAAR) 100 MG tablet; Take 1 tablet (100 mg total) by mouth in the morning  5  Malignant neoplasm of lower-outer quadrant of right breast of female, estrogen receptor positive ([facility] [address] )        Subjective:      Patient ID: Jonathan Flores is a 63 y o  female      HPI         The following portions of the patient's history were reviewed and updated as appropriate: allergies, current medications, past family history, past medical history, past social history, past surgical history, and problem list     Review of Systems      Objective:      /76 (BP Location: Left arm, Patient Position: Sitting, Cuff Size: Standard)   Pulse 68   Temp 98 °F (36 7 °C) (Temporal)   Ht 5' 3" (1 6 m)   Wt 90 4 kg (199 lb 6 4 oz)   LMP  (LMP Unknown)   SpO2 98%   BMI 35 32 kg/m²          Physical Exam

## 2022-05-17 ENCOUNTER — CONSULT (OUTPATIENT)
Dept: NEUROSURGERY | Facility: CLINIC | Age: 62
End: 2022-05-17
Payer: COMMERCIAL

## 2022-05-17 VITALS
WEIGHT: 192.3 LBS | RESPIRATION RATE: 16 BRPM | HEIGHT: 63 IN | TEMPERATURE: 98.2 F | BODY MASS INDEX: 34.07 KG/M2 | SYSTOLIC BLOOD PRESSURE: 158 MMHG | DIASTOLIC BLOOD PRESSURE: 101 MMHG | HEART RATE: 69 BPM

## 2022-05-17 DIAGNOSIS — R42 VERTIGO: ICD-10-CM

## 2022-05-17 DIAGNOSIS — D32.9 BENIGN MENINGIOMA (HCC): ICD-10-CM

## 2022-05-17 PROCEDURE — 99203 OFFICE O/P NEW LOW 30 MIN: CPT | Performed by: NEUROLOGICAL SURGERY

## 2022-05-17 PROCEDURE — 3008F BODY MASS INDEX DOCD: CPT | Performed by: SURGERY
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
## 2022-05-18 ENCOUNTER — TELEMEDICINE (OUTPATIENT)
Dept: SURGICAL ONCOLOGY | Facility: CLINIC | Age: 62
End: 2022-05-18
Payer: COMMERCIAL

## 2022-05-18 DIAGNOSIS — Z13.71 BRCA NEGATIVE: ICD-10-CM

## 2022-05-18 DIAGNOSIS — Z17.0 MALIGNANT NEOPLASM OF LOWER-OUTER QUADRANT OF RIGHT BREAST OF FEMALE, ESTROGEN RECEPTOR POSITIVE (HCC): Primary | ICD-10-CM

## 2022-05-18 DIAGNOSIS — C50.511 MALIGNANT NEOPLASM OF LOWER-OUTER QUADRANT OF RIGHT BREAST OF FEMALE, ESTROGEN RECEPTOR POSITIVE (HCC): Primary | ICD-10-CM

## 2022-05-18 PROCEDURE — 1036F TOBACCO NON-USER: CPT | Performed by: SURGERY

## 2022-05-18 PROCEDURE — 99213 OFFICE O/P EST LOW 20 MIN: CPT | Performed by: SURGERY

## 2022-05-18 NOTE — PROGRESS NOTES
Virtual Brief Visit    Patient is located in the following state in which I hold an active license [place]    Pt met with Dr Arlene Mahmood and would like to pursue IORT  Had genetic testing  Saw neurosurgery yesterday with stable findings  Is ready to discuss surgical management of her breast cancer  Data: Kevan Yee genetics is negative    Reviewed note by Dr Arlene Mahmood    postbiopsy mammo reviewed-kavita in place    Assessment/Plan: 62 yo female with a Stage IA right breast cancer  Her genetic testing was negative  She met with Dr Arlene Mahmood who agrees that Jana is a good candidate for IORT as well  I discussed this procedure with her, right kavita localized lumpectomy and sentinel node biopsy plus IORT, and answered all of her questions  She already has a kavita in place  We discussed a tentative date of 6/23 as this must be done at the [address] [place] [address]  She will need to return to the office 30 days prior for an exam and to sign consent etc      Problem List Items Addressed This Visit        Other    Malignant neoplasm of lower-outer quadrant of right breast of female, estrogen receptor positive ([facility] [address] ) - Primary    BRCA negative          Recent Visits  Date Type Provider Dept   05/16/22 Office Visit DO [facility]   Showing recent visits within past 7 days and meeting all other requirements  Future Appointments  No visits were found meeting these conditions    Showing future appointments within next 150 days and meeting all other requirements         I spent 15 minutes directly with the patient during this visit

## 2022-05-20 ENCOUNTER — TELEPHONE (OUTPATIENT)
Dept: SURGICAL ONCOLOGY | Facility: CLINIC | Age: 62
End: 2022-05-20

## 2022-05-20 NOTE — TELEPHONE ENCOUNTER
----- Message from Brenda Madison MA sent at 5/18/2022 10:50 AM EDT -----  I called and spoke to her and gave her the appointment for 6/7 as requested  She is aware that I am awaiting confirmation that Dr Gianna Briseno is available on 6/23 for the IORT portion of her surgery  I gave her a post op appointment too and told her once I get confirmation from her office I will call her as well to confirm it  Raquel,  She is questioning if you received her FMLA paperwork, should be 2 sets  One from daughter and one for her  Dropped back on 4/19 at   Thanks   ----- Message -----  From: Crispin Simeon MD  Sent: 5/18/2022   8:59 AM EDT  To: Justin Mccabe RN, Brenda Madison MA, #    This pt is going to have iort so the next avail date will be 6/23  Radiation doc is aren  Need to bring her into the office on 6/7 please at 1030 f/u long

## 2022-05-20 NOTE — TELEPHONE ENCOUNTER
Attempted calling Navdeep Thomas to discuss her FMLA Forms, no answer  Attempted calling her daughter, Norma Gusman, no answer  Left Norma Gusman a detailed message about their FMLA Forms  We only have [employer] and do not have one for Navdeep Thomas  Requested she return call to discuss

## 2022-05-23 DIAGNOSIS — C50.511 MALIGNANT NEOPLASM OF LOWER-OUTER QUADRANT OF RIGHT BREAST OF FEMALE, ESTROGEN RECEPTOR POSITIVE (HCC): Primary | ICD-10-CM

## 2022-05-23 DIAGNOSIS — Z17.0 MALIGNANT NEOPLASM OF LOWER-OUTER QUADRANT OF RIGHT BREAST OF FEMALE, ESTROGEN RECEPTOR POSITIVE (HCC): Primary | ICD-10-CM

## 2022-06-07 ENCOUNTER — TELEPHONE (OUTPATIENT)
Dept: SURGICAL ONCOLOGY | Facility: CLINIC | Age: 62
End: 2022-06-07

## 2022-06-07 ENCOUNTER — OFFICE VISIT (OUTPATIENT)
Dept: SURGICAL ONCOLOGY | Facility: CLINIC | Age: 62
End: 2022-06-07

## 2022-06-07 VITALS
SYSTOLIC BLOOD PRESSURE: 178 MMHG | BODY MASS INDEX: 34.91 KG/M2 | OXYGEN SATURATION: 97 % | TEMPERATURE: 96 F | HEART RATE: 71 BPM | DIASTOLIC BLOOD PRESSURE: 102 MMHG | HEIGHT: 63 IN | WEIGHT: 197 LBS | RESPIRATION RATE: 18 BRPM

## 2022-06-07 DIAGNOSIS — C50.511 MALIGNANT NEOPLASM OF LOWER-OUTER QUADRANT OF RIGHT BREAST OF FEMALE, ESTROGEN RECEPTOR POSITIVE (HCC): Primary | ICD-10-CM

## 2022-06-07 DIAGNOSIS — Z17.0 MALIGNANT NEOPLASM OF LOWER-OUTER QUADRANT OF RIGHT BREAST OF FEMALE, ESTROGEN RECEPTOR POSITIVE (HCC): Primary | ICD-10-CM

## 2022-06-07 DIAGNOSIS — Z13.71 BRCA NEGATIVE: ICD-10-CM

## 2022-06-07 PROCEDURE — 3008F BODY MASS INDEX DOCD: CPT | Performed by: SURGERY

## 2022-06-07 PROCEDURE — PREOP: Performed by: SURGERY

## 2022-06-07 RX ORDER — TRAMADOL HYDROCHLORIDE 50 MG/1
50 TABLET ORAL EVERY 8 HOURS PRN
Qty: 9 TABLET | Refills: 0 | Status: SHIPPED | OUTPATIENT
Start: 2022-06-07

## 2022-06-07 RX ORDER — CLINDAMYCIN PHOSPHATE 900 MG/50ML
900 INJECTION INTRAVENOUS
Status: CANCELLED | OUTPATIENT
Start: 2022-06-07

## 2022-06-07 RX ORDER — TRAMADOL HYDROCHLORIDE 50 MG/1
50 TABLET ORAL EVERY 8 HOURS PRN
Qty: 9 TABLET | Refills: 0 | Status: SHIPPED | OUTPATIENT
Start: 2022-06-07 | End: 2022-06-07 | Stop reason: CLARIF

## 2022-06-07 RX ORDER — ENOXAPARIN SODIUM 100 MG/ML
40 INJECTION SUBCUTANEOUS ONCE
Status: CANCELLED | OUTPATIENT
Start: 2022-06-07 | End: 2022-06-07
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
## 2022-06-07 NOTE — TELEPHONE ENCOUNTER
Called and left a detailed message for patient regarding her prescription being sent to her local [facility] pharmacy on file  Also clarified the Dr Ellie Cadet will be doing the IORT portion of her procedure on 6/23/22 due to Dr Aleyda Jackson availability and if she has any further concerns or questions she should reach out to me

## 2022-06-09 ENCOUNTER — APPOINTMENT (OUTPATIENT)
Dept: RADIOLOGY | Facility: MEDICAL CENTER | Age: 62
End: 2022-06-09
Payer: COMMERCIAL

## 2022-06-09 ENCOUNTER — APPOINTMENT (OUTPATIENT)
Dept: LAB | Facility: MEDICAL CENTER | Age: 62
End: 2022-06-09
Payer: COMMERCIAL

## 2022-06-09 ENCOUNTER — CLINICAL SUPPORT (OUTPATIENT)
Dept: URGENT CARE | Facility: MEDICAL CENTER | Age: 62
End: 2022-06-09
Payer: COMMERCIAL

## 2022-06-09 DIAGNOSIS — C50.511 MALIGNANT NEOPLASM OF LOWER-OUTER QUADRANT OF RIGHT BREAST OF FEMALE, ESTROGEN RECEPTOR POSITIVE (HCC): ICD-10-CM

## 2022-06-09 DIAGNOSIS — Z17.0 MALIGNANT NEOPLASM OF LOWER-OUTER QUADRANT OF RIGHT BREAST OF FEMALE, ESTROGEN RECEPTOR POSITIVE (HCC): ICD-10-CM

## 2022-06-09 LAB
ALBUMIN SERPL BCP-MCNC: 4.1 G/DL (ref 3.5–5)
ALP SERPL-CCNC: 70 U/L (ref 46–116)
ALT SERPL W P-5'-P-CCNC: 41 U/L (ref 12–78)
ANION GAP SERPL CALCULATED.3IONS-SCNC: 7 MMOL/L (ref 4–13)
AST SERPL W P-5'-P-CCNC: 29 U/L (ref 5–45)
ATRIAL RATE: 60 BPM
BACTERIA UR QL AUTO: NORMAL /HPF
BASOPHILS # BLD AUTO: 0.03 THOUSANDS/ΜL (ref 0–0.1)
BASOPHILS NFR BLD AUTO: 1 % (ref 0–1)
BILIRUB SERPL-MCNC: 0.4 MG/DL (ref 0.2–1)
BILIRUB UR QL STRIP: NEGATIVE
BUN SERPL-MCNC: 16 MG/DL (ref 5–25)
CALCIUM SERPL-MCNC: 9.4 MG/DL (ref 8.3–10.1)
CHLORIDE SERPL-SCNC: 102 MMOL/L (ref 100–108)
CLARITY UR: CLEAR
CO2 SERPL-SCNC: 29 MMOL/L (ref 21–32)
COLOR UR: ABNORMAL
CREAT SERPL-MCNC: 0.9 MG/DL (ref 0.6–1.3)
EOSINOPHIL # BLD AUTO: 0.21 THOUSAND/ΜL (ref 0–0.61)
EOSINOPHIL NFR BLD AUTO: 5 % (ref 0–6)
ERYTHROCYTE [DISTWIDTH] IN BLOOD BY AUTOMATED COUNT: 12.9 % (ref 11.6–15.1)
GFR SERPL CREATININE-BSD FRML MDRD: 70 ML/MIN/1.73SQ M
GLUCOSE P FAST SERPL-MCNC: 95 MG/DL (ref 65–99)
GLUCOSE UR STRIP-MCNC: NEGATIVE MG/DL
HCT VFR BLD AUTO: 39.2 % (ref 34.8–46.1)
HGB BLD-MCNC: 13.2 G/DL (ref 11.5–15.4)
HGB UR QL STRIP.AUTO: NEGATIVE
IMM GRANULOCYTES # BLD AUTO: 0.01 THOUSAND/UL (ref 0–0.2)
IMM GRANULOCYTES NFR BLD AUTO: 0 % (ref 0–2)
KETONES UR STRIP-MCNC: NEGATIVE MG/DL
LEUKOCYTE ESTERASE UR QL STRIP: ABNORMAL
LYMPHOCYTES # BLD AUTO: 1.38 THOUSANDS/ΜL (ref 0.6–4.47)
LYMPHOCYTES NFR BLD AUTO: 35 % (ref 14–44)
MCH RBC QN AUTO: 29 PG (ref 26.8–34.3)
MCHC RBC AUTO-ENTMCNC: 33.7 G/DL (ref 31.4–37.4)
MCV RBC AUTO: 86 FL (ref 82–98)
MONOCYTES # BLD AUTO: 0.38 THOUSAND/ΜL (ref 0.17–1.22)
MONOCYTES NFR BLD AUTO: 10 % (ref 4–12)
NEUTROPHILS # BLD AUTO: 1.89 THOUSANDS/ΜL (ref 1.85–7.62)
NEUTS SEG NFR BLD AUTO: 49 % (ref 43–75)
NITRITE UR QL STRIP: NEGATIVE
NON-SQ EPI CELLS URNS QL MICRO: NORMAL /HPF
NRBC BLD AUTO-RTO: 0 /100 WBCS
P AXIS: 39 DEGREES
PH UR STRIP.AUTO: 7.5 [PH]
PLATELET # BLD AUTO: 218 THOUSANDS/UL (ref 149–390)
PMV BLD AUTO: 11.7 FL (ref 8.9–12.7)
POTASSIUM SERPL-SCNC: 3.7 MMOL/L (ref 3.5–5.3)
PR INTERVAL: 190 MS
PROT SERPL-MCNC: 7.4 G/DL (ref 6.4–8.2)
PROT UR STRIP-MCNC: NEGATIVE MG/DL
QRS AXIS: -39 DEGREES
QRSD INTERVAL: 90 MS
QT INTERVAL: 442 MS
QTC INTERVAL: 442 MS
RBC # BLD AUTO: 4.55 MILLION/UL (ref 3.81–5.12)
RBC #/AREA URNS AUTO: NORMAL /HPF
SODIUM SERPL-SCNC: 138 MMOL/L (ref 136–145)
SP GR UR STRIP.AUTO: 1.01 (ref 1–1.03)
T WAVE AXIS: 49 DEGREES
UROBILINOGEN UR STRIP-ACNC: <2 MG/DL
VENTRICULAR RATE: 60 BPM
WBC # BLD AUTO: 3.9 THOUSAND/UL (ref 4.31–10.16)
WBC #/AREA URNS AUTO: NORMAL /HPF

## 2022-06-09 PROCEDURE — 71046 X-RAY EXAM CHEST 2 VIEWS: CPT

## 2022-06-09 PROCEDURE — 80053 COMPREHEN METABOLIC PANEL: CPT

## 2022-06-09 PROCEDURE — 93010 ELECTROCARDIOGRAM REPORT: CPT | Performed by: INTERNAL MEDICINE

## 2022-06-09 PROCEDURE — 85025 COMPLETE CBC W/AUTO DIFF WBC: CPT

## 2022-06-09 PROCEDURE — 36415 COLL VENOUS BLD VENIPUNCTURE: CPT

## 2022-06-09 PROCEDURE — 81001 URINALYSIS AUTO W/SCOPE: CPT | Performed by: SURGERY

## 2022-06-09 PROCEDURE — 93005 ELECTROCARDIOGRAM TRACING: CPT

## 2022-06-15 ENCOUNTER — TELEPHONE (OUTPATIENT)
Dept: GENETICS | Facility: CLINIC | Age: 62
End: 2022-06-15

## 2022-06-15 NOTE — TELEPHONE ENCOUNTER
----- Message from Tianna Rodgers sent at 6/15/2022  9:28 AM EDT -----  GC Completed Chart     Result Type: Negative    Result Delivery: AwoXt Message    Monthly Review: Does not need monthly review- COMPLETE

## 2022-06-15 NOTE — TELEPHONE ENCOUNTER
Genetic Test Result Note:    Today I spoke with Jana over the phone to review the results of her genetic test for hereditary cancer  She underwent genetic testing through our program on 4/19/2022 due to her recent diagnosis of breast cancer  Her results will be sent to her breast surgeon Eulalia Rutherford     A separate report of her STAT genetic test results were disclosed to her on 5/3/2022 by Scott Wang MA  This result includes new genes and does not change her initial genetic test result  SUMMARY:    Test(s): APC, AXIN2 BARD1, BRIP1, BMPR1A, CDK4, CDKN2A, DICER1, EPCAM, GREM1, HOXB13, MLH1, MSH2, MSH3, MSH6, MUTYH, NBN, NF1, NTHL1, PMS2, POLD1, POLE, RAD51C, RAD51D, RECQL SMAD4, SMARCA4, STK11    Result: Negative - No Clinically Significant Variants Detected      Assessment:   A negative result significantly reduces the likelihood that Tracy Hand has a hereditary cancer syndrome  However, this testing is unable to completely rule out the presence of hereditary cancer  It remains possible that:  - There is a variant in an area of a gene which was not tested or there is a variant not detectable due to technical limitations of this test      - There is a variant in another gene that was not included in this test or in a gene not known to be linked to cancer or tumors  - A family member has a genetic variant that the patient did not inherit  - The cancer in the family is sporadic and is related to non-hereditary factors  Risks and Testing for Family Members:  Tracy Hand was made aware that all of her first-degree relatives are at increased risk to develop breast cancer based on her recent diagnosis and family history of breast cancer   Her family members may also be at increased risk to develop other cancers in her family history, specifically:    · Mother (80) history of breast cancer (Dx 68)  · Aunt (d 40s) history of renal cancer (dx 35s)  · Uncle with history of lung cancer  · Daughter (61) with history of breast cancer  (dx 64)    We recommend that her first-degree relatives make their healthcare providers aware of the family history and discuss their options for screening and risk-reduction  At this time we do not recommend testing for Lisa Dumas 's children based on her negative test result  Lisa Torrezs children still need to consider the history of cancer on the other side of their family when determining their risks  If Lisa Dumas has any affected family members with a cancer diagnosis, especially at a young age, they may still consider genetic testing  Relatives who wish to pursue genetic testing can reach out to the [facility] [address] ([id number]) to schedule an appointment or visit www [facility] org to identify a local genetic counselor  Plan:     Negative Result: This result does not have surgical implications and surgical options should be discussed with her healthcare provider   Jana's breast surgeon, Dr Radha Walter will be made aware of her results

## 2022-06-20 RX ORDER — FEXOFENADINE HCL 180 MG/1
180 TABLET ORAL
COMMUNITY

## 2022-06-20 NOTE — PRE-PROCEDURE INSTRUCTIONS
Pre-Surgery Instructions:   Medication Instructions    acetaminophen (TYLENOL) 650 mg CR tablet Uses PRN- OK to take day of surgery    albuterol (Ventolin HFA) 90 mcg/act inhaler Uses PRN- OK to take day of surgery    Cholecalciferol (VITAMIN D3) 2000 units capsule Stop taking 7 days prior to surgery   fexofenadine (ALLEGRA) 180 MG tablet Take day of surgery   fluticasone (FLONASE) 50 mcg/act nasal spray Uses PRN- OK to take day of surgery    hydrochlorothiazide (HYDRODIURIL) 12 5 mg tablet Hold day of surgery   levothyroxine 50 mcg tablet Take day of surgery   losartan (COZAAR) 100 MG tablet Hold day of surgery   Magnesium 500 MG TABS Stop taking 7 days prior to surgery   meclizine (ANTIVERT) 25 mg tablet Uses PRN- OK to take day of surgery    ondansetron (ZOFRAN) 4 mg tablet Uses PRN- OK to take day of surgery    rizatriptan (MAXALT-MLT) 10 MG disintegrating tablet Uses PRN- OK to take day of surgery    Pre op instructions per My Surgical Experience booklet,medications per anesthesia guidelines and showering instructions per Panchito Headley protocol reviewed-Patient has CHG  Pt  Verbalized understanding of current visitor restrictions  Instructed to call surgeon with any illness or covid exposure now till DOS  All medications instructed to take DOS are with sips water only  Instructed to avoid all ASA/NSAIDs and OTC Vit/Supp from now until after surgery per anesthesia guidelines  Tylenol ok prn  Pt  Verbalized an understanding of all instructions reviewed and offers no concerns at this time

## 2022-06-23 ENCOUNTER — HOSPITAL ENCOUNTER (OUTPATIENT)
Dept: NUCLEAR MEDICINE | Facility: HOSPITAL | Age: 62
Discharge: HOME | End: 2022-06-23
Attending: SURGERY
Payer: COMMERCIAL

## 2022-06-23 ENCOUNTER — ANESTHESIA (OUTPATIENT)
Dept: PERIOP | Facility: HOSPITAL | Age: 62
End: 2022-06-23
Payer: COMMERCIAL

## 2022-06-23 ENCOUNTER — HOSPITAL ENCOUNTER (OUTPATIENT)
Facility: HOSPITAL | Age: 62
Setting detail: OUTPATIENT SURGERY
Discharge: HOME | End: 2022-06-23
Attending: SURGERY | Admitting: SURGERY
Payer: COMMERCIAL

## 2022-06-23 ENCOUNTER — APPOINTMENT (OUTPATIENT)
Dept: RADIOLOGY | Facility: HOSPITAL | Age: 62
End: 2022-06-23
Payer: COMMERCIAL

## 2022-06-23 ENCOUNTER — ANESTHESIA EVENT (OUTPATIENT)
Dept: PERIOP | Facility: HOSPITAL | Age: 62
End: 2022-06-23
Payer: COMMERCIAL

## 2022-06-23 ENCOUNTER — APPOINTMENT (OUTPATIENT)
Dept: RADIATION ONCOLOGY | Facility: HOSPITAL | Age: 62
End: 2022-06-23
Attending: RADIOLOGY
Payer: COMMERCIAL

## 2022-06-23 VITALS
SYSTOLIC BLOOD PRESSURE: 170 MMHG | RESPIRATION RATE: 18 BRPM | HEIGHT: 63 IN | HEART RATE: 70 BPM | BODY MASS INDEX: 34.91 KG/M2 | OXYGEN SATURATION: 98 % | TEMPERATURE: 97.5 F | WEIGHT: 197 LBS | DIASTOLIC BLOOD PRESSURE: 82 MMHG

## 2022-06-23 DIAGNOSIS — C50.511 MALIGNANT NEOPLASM OF LOWER-OUTER QUADRANT OF RIGHT BREAST OF FEMALE, ESTROGEN RECEPTOR POSITIVE (HCC): ICD-10-CM

## 2022-06-23 DIAGNOSIS — Z17.0 MALIGNANT NEOPLASM OF LOWER-OUTER QUADRANT OF RIGHT BREAST OF FEMALE, ESTROGEN RECEPTOR POSITIVE (HCC): ICD-10-CM

## 2022-06-23 PROCEDURE — A9541 TC99M SULFUR COLLOID: HCPCS

## 2022-06-23 PROCEDURE — 77300 RADIATION THERAPY DOSE PLAN: CPT | Performed by: RADIOLOGY

## 2022-06-23 PROCEDURE — 77332 RADIATION TREATMENT AID(S): CPT | Performed by: RADIOLOGY

## 2022-06-23 PROCEDURE — 88342 IMHCHEM/IMCYTCHM 1ST ANTB: CPT | Performed by: PATHOLOGY

## 2022-06-23 PROCEDURE — 88307 TISSUE EXAM BY PATHOLOGIST: CPT | Performed by: PATHOLOGY

## 2022-06-23 PROCEDURE — 19301 PARTIAL MASTECTOMY: CPT | Performed by: SURGERY

## 2022-06-23 PROCEDURE — 76098 X-RAY EXAM SURGICAL SPECIMEN: CPT | Performed by: SURGERY

## 2022-06-23 PROCEDURE — 77470 SPECIAL RADIATION TREATMENT: CPT | Performed by: RADIOLOGY

## 2022-06-23 PROCEDURE — 38525 BIOPSY/REMOVAL LYMPH NODES: CPT | Performed by: SURGERY

## 2022-06-23 PROCEDURE — 88331 PATH CONSLTJ SURG 1 BLK 1SPC: CPT | Performed by: PATHOLOGY

## 2022-06-23 PROCEDURE — G1004 CDSM NDSC: HCPCS

## 2022-06-23 PROCEDURE — 78195 LYMPH SYSTEM IMAGING: CPT

## 2022-06-23 PROCEDURE — 88341 IMHCHEM/IMCYTCHM EA ADD ANTB: CPT | Performed by: PATHOLOGY

## 2022-06-23 PROCEDURE — 77424 IO RAD TX DELIVERY BY X-RAY: CPT | Performed by: RADIOLOGY

## 2022-06-23 PROCEDURE — 38900 IO MAP OF SENT LYMPH NODE: CPT | Performed by: SURGERY

## 2022-06-23 PROCEDURE — 77469 IO RADIATION TX MANAGEMENT: CPT | Performed by: RADIOLOGY

## 2022-06-23 PROCEDURE — 19499 UNLISTED PROCEDURE BREAST: CPT | Performed by: SURGERY

## 2022-06-23 PROCEDURE — C9726 RXT BREAST APPL PLACE/REMOV: HCPCS | Performed by: RADIOLOGY

## 2022-06-23 RX ORDER — PROPOFOL 10 MG/ML
INJECTION, EMULSION INTRAVENOUS AS NEEDED
Status: DISCONTINUED | OUTPATIENT
Start: 2022-06-23 | End: 2022-06-23

## 2022-06-23 RX ORDER — ONDANSETRON 2 MG/ML
INJECTION INTRAMUSCULAR; INTRAVENOUS AS NEEDED
Status: DISCONTINUED | OUTPATIENT
Start: 2022-06-23 | End: 2022-06-23

## 2022-06-23 RX ORDER — FENTANYL CITRATE 50 UG/ML
INJECTION, SOLUTION INTRAMUSCULAR; INTRAVENOUS AS NEEDED
Status: DISCONTINUED | OUTPATIENT
Start: 2022-06-23 | End: 2022-06-23

## 2022-06-23 RX ORDER — TRAMADOL HYDROCHLORIDE 50 MG/1
50 TABLET ORAL EVERY 6 HOURS PRN
Status: DISCONTINUED | OUTPATIENT
Start: 2022-06-23 | End: 2022-06-23 | Stop reason: HOSPADM

## 2022-06-23 RX ORDER — HYDRALAZINE HYDROCHLORIDE 20 MG/ML
10 INJECTION INTRAMUSCULAR; INTRAVENOUS ONCE
Status: COMPLETED | OUTPATIENT
Start: 2022-06-23 | End: 2022-06-23

## 2022-06-23 RX ORDER — SODIUM CHLORIDE, SODIUM LACTATE, POTASSIUM CHLORIDE, CALCIUM CHLORIDE 600; 310; 30; 20 MG/100ML; MG/100ML; MG/100ML; MG/100ML
125 INJECTION, SOLUTION INTRAVENOUS CONTINUOUS
Status: DISCONTINUED | OUTPATIENT
Start: 2022-06-23 | End: 2022-06-23 | Stop reason: HOSPADM

## 2022-06-23 RX ORDER — CLINDAMYCIN PHOSPHATE 900 MG/50ML
900 INJECTION INTRAVENOUS
Status: DISCONTINUED | OUTPATIENT
Start: 2022-06-23 | End: 2022-06-23 | Stop reason: HOSPADM

## 2022-06-23 RX ORDER — LIDOCAINE HYDROCHLORIDE 10 MG/ML
INJECTION, SOLUTION EPIDURAL; INFILTRATION; INTRACAUDAL; PERINEURAL AS NEEDED
Status: DISCONTINUED | OUTPATIENT
Start: 2022-06-23 | End: 2022-06-23

## 2022-06-23 RX ORDER — LIDOCAINE HYDROCHLORIDE 10 MG/ML
0.5 INJECTION, SOLUTION EPIDURAL; INFILTRATION; INTRACAUDAL; PERINEURAL ONCE AS NEEDED
Status: DISCONTINUED | OUTPATIENT
Start: 2022-06-23 | End: 2022-06-23 | Stop reason: HOSPADM

## 2022-06-23 RX ORDER — BUPIVACAINE HYDROCHLORIDE 5 MG/ML
INJECTION, SOLUTION PERINEURAL AS NEEDED
Status: DISCONTINUED | OUTPATIENT
Start: 2022-06-23 | End: 2022-06-23 | Stop reason: HOSPADM

## 2022-06-23 RX ORDER — ENOXAPARIN SODIUM 100 MG/ML
40 INJECTION SUBCUTANEOUS ONCE
Status: COMPLETED | OUTPATIENT
Start: 2022-06-23 | End: 2022-06-23

## 2022-06-23 RX ORDER — MIDAZOLAM HYDROCHLORIDE 2 MG/2ML
INJECTION, SOLUTION INTRAMUSCULAR; INTRAVENOUS AS NEEDED
Status: DISCONTINUED | OUTPATIENT
Start: 2022-06-23 | End: 2022-06-23

## 2022-06-23 RX ORDER — FENTANYL CITRATE/PF 50 MCG/ML
25 SYRINGE (ML) INJECTION
Status: DISCONTINUED | OUTPATIENT
Start: 2022-06-23 | End: 2022-06-23 | Stop reason: HOSPADM

## 2022-06-23 RX ORDER — ACETAMINOPHEN 325 MG/1
650 TABLET ORAL EVERY 6 HOURS PRN
Status: DISCONTINUED | OUTPATIENT
Start: 2022-06-23 | End: 2022-06-23 | Stop reason: HOSPADM

## 2022-06-23 RX ORDER — SODIUM CHLORIDE, SODIUM LACTATE, POTASSIUM CHLORIDE, CALCIUM CHLORIDE 600; 310; 30; 20 MG/100ML; MG/100ML; MG/100ML; MG/100ML
20 INJECTION, SOLUTION INTRAVENOUS CONTINUOUS
Status: DISCONTINUED | OUTPATIENT
Start: 2022-06-23 | End: 2022-06-23 | Stop reason: HOSPADM

## 2022-06-23 RX ORDER — KETOROLAC TROMETHAMINE 30 MG/ML
INJECTION, SOLUTION INTRAMUSCULAR; INTRAVENOUS AS NEEDED
Status: DISCONTINUED | OUTPATIENT
Start: 2022-06-23 | End: 2022-06-23

## 2022-06-23 RX ORDER — ROCURONIUM BROMIDE 10 MG/ML
INJECTION, SOLUTION INTRAVENOUS AS NEEDED
Status: DISCONTINUED | OUTPATIENT
Start: 2022-06-23 | End: 2022-06-23

## 2022-06-23 RX ORDER — ISOSULFAN BLUE 50 MG/5ML
INJECTION, SOLUTION SUBCUTANEOUS AS NEEDED
Status: DISCONTINUED | OUTPATIENT
Start: 2022-06-23 | End: 2022-06-23 | Stop reason: HOSPADM

## 2022-06-23 RX ORDER — LABETALOL HYDROCHLORIDE 5 MG/ML
10 INJECTION, SOLUTION INTRAVENOUS
Status: DISCONTINUED | OUTPATIENT
Start: 2022-06-23 | End: 2022-06-23 | Stop reason: HOSPADM

## 2022-06-23 RX ORDER — DEXAMETHASONE SODIUM PHOSPHATE 10 MG/ML
INJECTION, SOLUTION INTRAMUSCULAR; INTRAVENOUS AS NEEDED
Status: DISCONTINUED | OUTPATIENT
Start: 2022-06-23 | End: 2022-06-23

## 2022-06-23 RX ADMIN — PROPOFOL 200 MG: 10 INJECTION, EMULSION INTRAVENOUS at 14:02

## 2022-06-23 RX ADMIN — LIDOCAINE HYDROCHLORIDE 50 MG: 10 INJECTION, SOLUTION EPIDURAL; INFILTRATION; INTRACAUDAL; PERINEURAL at 14:02

## 2022-06-23 RX ADMIN — FENTANYL CITRATE 50 MCG: 50 INJECTION INTRAMUSCULAR; INTRAVENOUS at 14:22

## 2022-06-23 RX ADMIN — SODIUM CHLORIDE, SODIUM LACTATE, POTASSIUM CHLORIDE, AND CALCIUM CHLORIDE: .6; .31; .03; .02 INJECTION, SOLUTION INTRAVENOUS at 15:25

## 2022-06-23 RX ADMIN — DEXAMETHASONE SODIUM PHOSPHATE 10 MG: 10 INJECTION, SOLUTION INTRAMUSCULAR; INTRAVENOUS at 14:02

## 2022-06-23 RX ADMIN — SODIUM CHLORIDE, SODIUM LACTATE, POTASSIUM CHLORIDE, AND CALCIUM CHLORIDE: .6; .31; .03; .02 INJECTION, SOLUTION INTRAVENOUS at 13:51

## 2022-06-23 RX ADMIN — FENTANYL CITRATE 50 MCG: 50 INJECTION INTRAMUSCULAR; INTRAVENOUS at 14:56

## 2022-06-23 RX ADMIN — ENOXAPARIN SODIUM 40 MG: 40 INJECTION SUBCUTANEOUS at 12:40

## 2022-06-23 RX ADMIN — ROCURONIUM BROMIDE 10 MG: 50 INJECTION, SOLUTION INTRAVENOUS at 15:36

## 2022-06-23 RX ADMIN — FENTANYL CITRATE 25 MCG: 50 INJECTION INTRAMUSCULAR; INTRAVENOUS at 17:09

## 2022-06-23 RX ADMIN — ROCURONIUM BROMIDE 20 MG: 50 INJECTION, SOLUTION INTRAVENOUS at 14:51

## 2022-06-23 RX ADMIN — ONDANSETRON 4 MG: 2 INJECTION INTRAMUSCULAR; INTRAVENOUS at 15:28

## 2022-06-23 RX ADMIN — CLINDAMYCIN PHOSPHATE 900 MG: 900 INJECTION, SOLUTION INTRAVENOUS at 13:51

## 2022-06-23 RX ADMIN — FENTANYL CITRATE 25 MCG: 50 INJECTION INTRAMUSCULAR; INTRAVENOUS at 17:14

## 2022-06-23 RX ADMIN — FENTANYL CITRATE 50 MCG: 50 INJECTION INTRAMUSCULAR; INTRAVENOUS at 14:02

## 2022-06-23 RX ADMIN — FENTANYL CITRATE 50 MCG: 50 INJECTION INTRAMUSCULAR; INTRAVENOUS at 16:12

## 2022-06-23 RX ADMIN — TRAMADOL HYDROCHLORIDE 50 MG: 50 TABLET, COATED ORAL at 18:06

## 2022-06-23 RX ADMIN — MIDAZOLAM 2 MG: 1 INJECTION INTRAMUSCULAR; INTRAVENOUS at 13:51

## 2022-06-23 RX ADMIN — KETOROLAC TROMETHAMINE 15 MG: 30 INJECTION, SOLUTION INTRAMUSCULAR at 16:41

## 2022-06-23 RX ADMIN — HYDRALAZINE HYDROCHLORIDE 10 MG: 20 INJECTION INTRAMUSCULAR; INTRAVENOUS at 18:24

## 2022-06-23 RX ADMIN — SUGAMMADEX 200 MG: 100 INJECTION, SOLUTION INTRAVENOUS at 16:26

## 2022-06-23 RX ADMIN — ROCURONIUM BROMIDE 40 MG: 50 INJECTION, SOLUTION INTRAVENOUS at 14:02

## 2022-06-23 NOTE — DISCHARGE INSTRUCTIONS
POST-OPERATIVE CARE INSTRUCTIONS       Care after your procedure:   General  Rest and relax for 24 hours, then gradually return to normal activities  Do not preform any heavy lifting or strenuous physical activities for 14 days  Your activity restrictions will be re-evaluated at your post op visit  Drink clear liquids until you are certain there is no nausea, then resume a normal diet  Do not drink alcohol, drive any vehicle, operate mechanical equipment or make critical decisions for at least 24 hours and until you are off any narcotic pain medications  The Incision  Your incision is closed with:   dissolvable stiches just underneath the skin  The incision is also covered with:                          clear waterproof glue  A gauze-pad is covering the wound  Wound care  Remove your gauze-pad after 24 hours  You may then shower using soap and water to clean your incision  Gently dry the wound  You may redress your wound with additional gauze and tape if you choose  A little bruising at the wound site is normal    Danilo-Alfaro Drain (JPDrain) see separate instructions  Medication  Resume all previous medications  Take either Naproxen (Aleve) one tablet every 8 hours or Ibuprofen(Advil/Motrin) one(1) to two(2) tablets every 6 hours as needed  Pain Medication Instructions:may also use over the counter tylenol or prescription tramadol if needed          Other (If applicable)  Wear a post-surgical bra around the clock  May use ice to the incision site(s) for the next 24-48 hours, twice daily     Call your  doctor if you have any of the following:  Redness, swelling, heat, drainage, and/or bleeding from your wound  Chills or fever ( above 101' F )  Pain, not relieved with the above medications  If you have any questions or problems call our office [phone]    Follow-up appointment:  As scheduled

## 2022-06-23 NOTE — ANESTHESIA PREPROCEDURE EVALUATION
Procedure:  BREAST LUMPECTOMY KASSI LOCALIZED (Right Breast)  BIOPSY LYMPH NODE SENTINEL,Lymphatic Mapping, Lymphocintigraphy (NUC MED INJECTION AT 1200) (Right Axilla)  BREAST INTRAOPERATIVE RADIATION THERAPY (IORT) BY DR Cooper Arnold (Right Breast)    Relevant Problems   CARDIO   (+) Hypertension      ENDO   (+) Hypothyroidism      GYN   (+) Malignant neoplasm of lower-outer quadrant of right breast of female, estrogen receptor positive (HCC)        Physical Exam    Airway    Mallampati score: III  TM Distance: >3 FB  Neck ROM: full     Dental   No notable dental hx     Cardiovascular  Rhythm: regular, Rate: normal, Cardiovascular exam normal    Pulmonary  Pulmonary exam normal Breath sounds clear to auscultation,     Other Findings        Anesthesia Plan  ASA Score- 2     Anesthesia Type- general with ASA Monitors  Additional Monitors:   Airway Plan: ETT  Comment: Risks/benefits and alternatives discussed with patient including likely possibility of PONV and sore throat, as well as the rare possibilities of aspiration, dental/oropharyngeal/ocular injuries, or grave/life threatening anesthetic and surgical emergencies          Plan Factors-Exercise tolerance (METS): >4 METS  Patient summary reviewed  Patient instructed to abstain from smoking on day of procedure  Patient did not smoke on day of surgery  Induction- intravenous  Postoperative Plan- Plan for postoperative opioid use  Planned trial extubation    Informed Consent- Anesthetic plan and risks discussed with patient  I personally reviewed this patient with the CRNA  Discussed and agreed on the Anesthesia Plan with the CRNA  Philomena Michaels

## 2022-06-23 NOTE — ANESTHESIA POSTPROCEDURE EVALUATION
Post-Op Assessment Note    CV Status:  Stable  Pain Score: 4    Pain management: adequate     Mental Status:  Alert and awake   Hydration Status:  Euvolemic   PONV Controlled:  Controlled   Airway Patency:  Patent  Airway: intubated      Post Op Vitals Reviewed: Yes      Staff: CRNA         No complications documented      BP  161/79   Temp  97   Pulse  70   Resp   18   SpO2   97

## 2022-06-30 ENCOUNTER — TELEPHONE (OUTPATIENT)
Dept: SURGICAL ONCOLOGY | Facility: CLINIC | Age: 62
End: 2022-06-30

## 2022-06-30 NOTE — TELEPHONE ENCOUNTER
----- Message from Noris Bergman sent at 6/30/2022  8:28 AM EDT -----  Regarding: FW: Post op    ----- Message -----  From: Jimena Pereira  Sent: 6/30/2022   8:20 AM EDT  To: Surgical Oncology Clinical  Subject: Post op                                          Morning, Im having a small amount of drainage beneath the right breast incision  No fever no additional pain  Also the lymph node area still has alt of swelling  Should I still be using ice? Or should I use heat?  Thank you

## 2022-06-30 NOTE — TELEPHONE ENCOUNTER
Called and spoke with Jana regarding her concerns  Informed her of  Dr Skyler Black recommendations to do warm compresses  to site, no heating pads, several times a day  Do a daily shower and apply antibiotic ointment to site with a DSD  Instructed her to watch for signs of fever, redness, change in drainage and call office if needed  Provided office contact number  She understands

## 2022-07-07 ENCOUNTER — TELEPHONE (OUTPATIENT)
Dept: SURGICAL ONCOLOGY | Facility: CLINIC | Age: 62
End: 2022-07-07

## 2022-07-07 ENCOUNTER — OFFICE VISIT (OUTPATIENT)
Dept: SURGICAL ONCOLOGY | Facility: CLINIC | Age: 62
End: 2022-07-07

## 2022-07-07 VITALS
DIASTOLIC BLOOD PRESSURE: 94 MMHG | BODY MASS INDEX: 35.08 KG/M2 | WEIGHT: 198 LBS | RESPIRATION RATE: 18 BRPM | TEMPERATURE: 98.3 F | SYSTOLIC BLOOD PRESSURE: 138 MMHG | HEIGHT: 63 IN | OXYGEN SATURATION: 98 % | HEART RATE: 69 BPM

## 2022-07-07 DIAGNOSIS — Z17.0 MALIGNANT NEOPLASM OF LOWER-OUTER QUADRANT OF RIGHT BREAST OF FEMALE, ESTROGEN RECEPTOR POSITIVE (HCC): Primary | ICD-10-CM

## 2022-07-07 DIAGNOSIS — C50.511 MALIGNANT NEOPLASM OF LOWER-OUTER QUADRANT OF RIGHT BREAST OF FEMALE, ESTROGEN RECEPTOR POSITIVE (HCC): Primary | ICD-10-CM

## 2022-07-07 DIAGNOSIS — S20.01XD POSTTRAUMATIC HEMATOMA OF RIGHT BREAST, SUBSEQUENT ENCOUNTER: ICD-10-CM

## 2022-07-07 PROCEDURE — 99024 POSTOP FOLLOW-UP VISIT: CPT | Performed by: SURGERY

## 2022-07-07 NOTE — PROGRESS NOTES
63 y o  female is here today s/p right lumpectomy and sentinel node biopsy as well as intraoperative radiation therapy  She reports continued swelling and discomfort  Physical Exam  Constitutional:       General: She is not in acute distress  Chest:   Breasts:      Right: Skin change (Well-healing incision in the breast and axilla with no signs of infection, resolving ecchymosis in between the two scars) present  Neurological:      Mental Status: She is alert and oriented to person, place, and time  Psychiatric:         Mood and Affect: Mood normal          Data:   2022 right lumpectomy and sentinel node biopsy    Stagin mm idc  Tumor grade one  LVI not identified  Margins clean  Estrogen receptor and progesterone receptor status positive  HER2 status and test method negative    Lymph node assessment/status 0/2      Neoadjuvant therapy:  Not applicable  Stage: IA        Diagnoses and all orders for this visit:    Malignant neoplasm of lower-outer quadrant of right breast of female, estrogen receptor positive ([facility] [address] )  -     Ambulatory referral to Hematology / Oncology; Future    Posttraumatic hematoma of right breast, subsequent encounter        Assessment/Plan:  51-year-old female status post right breast conservation including intraoperative radiation therapy for a low-grade ER positive HER2 negative invasive ductal carcinoma, clinical and pathologic stage IA  She already has follow-up with the radiation oncologist   I will make arrangements for the medical oncology consult  I advised her to use warm compresses to the breast for the resolving hematoma  She should also continue to wear a good supportive bra  We will see her again in six months for another exam or sooner should the need arise

## 2022-07-07 NOTE — TELEPHONE ENCOUNTER
Received a message from Jana with questions regarding her level of activities and the need for a return to work note  Discussed slowly easing back into her daily activities  Will send her a return to work note via mail  She understands

## 2022-07-11 ENCOUNTER — TELEPHONE (OUTPATIENT)
Dept: SURGICAL ONCOLOGY | Facility: CLINIC | Age: 62
End: 2022-07-11

## 2022-07-11 ENCOUNTER — OFFICE VISIT (OUTPATIENT)
Dept: SURGICAL ONCOLOGY | Facility: CLINIC | Age: 62
End: 2022-07-11
Payer: COMMERCIAL

## 2022-07-11 VITALS
DIASTOLIC BLOOD PRESSURE: 78 MMHG | SYSTOLIC BLOOD PRESSURE: 122 MMHG | WEIGHT: 199 LBS | HEIGHT: 63 IN | OXYGEN SATURATION: 97 % | HEART RATE: 62 BPM | BODY MASS INDEX: 35.26 KG/M2 | TEMPERATURE: 97.8 F | RESPIRATION RATE: 16 BRPM

## 2022-07-11 DIAGNOSIS — N61.0 BREAST INFECTION: Primary | ICD-10-CM

## 2022-07-11 PROCEDURE — 99213 OFFICE O/P EST LOW 20 MIN: CPT

## 2022-07-11 RX ORDER — CLINDAMYCIN HYDROCHLORIDE 300 MG/1
300 CAPSULE ORAL 3 TIMES DAILY
Qty: 21 CAPSULE | Refills: 0 | Status: SHIPPED | OUTPATIENT
Start: 2022-07-11 | End: 2022-07-18

## 2022-07-11 NOTE — TELEPHONE ENCOUNTER
Called patient to follow up with her [employer] message that was sent  Patient reports that yesterday evening, her incision drained a copious amount of off-white/clear colored fluid that completely soaked her shirt and soaked through a paper towel  Patient also reports that she developed a fever of 101 last night  Patient states that her fever and the drainage resolved overnight  Denies any redness or bruising in her surgery breast but does state that the breast still feels "puffy" and is "very, very painful " Patient was scheduled for a same-day appointment with LEA Wellington at the [facility] office for further evaluation  Patient verbalized understanding of appointment details
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
## 2022-07-13 ENCOUNTER — PATIENT MESSAGE (OUTPATIENT)
Dept: SURGICAL ONCOLOGY | Facility: CLINIC | Age: 62
End: 2022-07-13

## 2022-07-13 NOTE — TELEPHONE ENCOUNTER
Called the patient to further discuss this  Patient reports that the drainage was very "gooey" when she woke up this morning but has since become a thinner consistency  Patient states that overall, her symptoms have not worsened since starting the antibiotic on Monday late afternoon  Instructed patient to continue to monitor the area and to contact our office if anything changed or became worse prior to her follow up with [address] on 7/25  Patient verbalized understanding and was appreciative of the call

## 2022-07-20 ENCOUNTER — TELEPHONE (OUTPATIENT)
Dept: SURGICAL ONCOLOGY | Facility: CLINIC | Age: 62
End: 2022-07-20

## 2022-07-20 NOTE — TELEPHONE ENCOUNTER
Received a call from Maylea who sgared concerns regarding continued drainage from her incision line, yellow brown in color in large amounts  She changes guaze dressings 4 X a day  She still sees an open area in the incision line  She is concerned about returning to work    Will see her tomorrow to check surgical site

## 2022-07-21 ENCOUNTER — OFFICE VISIT (OUTPATIENT)
Dept: SURGICAL ONCOLOGY | Facility: CLINIC | Age: 62
End: 2022-07-21

## 2022-07-21 VITALS
HEART RATE: 73 BPM | SYSTOLIC BLOOD PRESSURE: 158 MMHG | HEIGHT: 63 IN | WEIGHT: 199 LBS | RESPIRATION RATE: 18 BRPM | BODY MASS INDEX: 35.26 KG/M2 | TEMPERATURE: 98.5 F | OXYGEN SATURATION: 99 % | DIASTOLIC BLOOD PRESSURE: 90 MMHG

## 2022-07-21 DIAGNOSIS — C50.511 MALIGNANT NEOPLASM OF LOWER-OUTER QUADRANT OF RIGHT BREAST OF FEMALE, ESTROGEN RECEPTOR POSITIVE (HCC): Primary | ICD-10-CM

## 2022-07-21 DIAGNOSIS — Z17.0 MALIGNANT NEOPLASM OF LOWER-OUTER QUADRANT OF RIGHT BREAST OF FEMALE, ESTROGEN RECEPTOR POSITIVE (HCC): Primary | ICD-10-CM

## 2022-07-21 PROBLEM — N61.0 BREAST INFECTION: Status: RESOLVED | Noted: 2022-07-11 | Resolved: 2022-07-21

## 2022-07-21 PROCEDURE — 99024 POSTOP FOLLOW-UP VISIT: CPT | Performed by: SURGERY

## 2022-07-21 NOTE — PROGRESS NOTES
63 y o  female is here today s/p right breast conservation including intraoperative radiation therapy  She reports drainage from the wound and is concerned about returning to work  Physical Exam  Constitutional:       General: She is not in acute distress  Chest:   Breasts:      Right: Skin change (Well-healing incision in the axilla, resolving mild ecchymosis in the breast, breast incision with a small opening central with scant drainage, no residual infection) present  Neurological:      Mental Status: She is alert and oriented to person, place, and time  Psychiatric:         Mood and Affect: Mood normal            Diagnoses and all orders for this visit:    Malignant neoplasm of lower-outer quadrant of right breast of female, estrogen receptor positive ([facility] [address] )        Assessment/Plan:  79-year-old female status post right breast conservation including intraoperative radiation therapy  She presented a week ago for an infection  She was started on antibiotics  She has a small residual opening in the breast incision line with scant drainage  Her ecchymosis is nearly resolved  I advised her to continue warm compresses  She should use antibiotic ointment to the site and keep a dry gauze on until this closes  I will give her additional time off work secondary to the nature of her job

## 2022-07-22 ENCOUNTER — CONSULT (OUTPATIENT)
Dept: HEMATOLOGY ONCOLOGY | Facility: CLINIC | Age: 62
End: 2022-07-22
Payer: COMMERCIAL

## 2022-07-22 VITALS
BODY MASS INDEX: 34.55 KG/M2 | HEART RATE: 67 BPM | WEIGHT: 195 LBS | TEMPERATURE: 97.4 F | RESPIRATION RATE: 18 BRPM | DIASTOLIC BLOOD PRESSURE: 72 MMHG | HEIGHT: 63 IN | SYSTOLIC BLOOD PRESSURE: 114 MMHG | OXYGEN SATURATION: 98 %

## 2022-07-22 DIAGNOSIS — Z17.0 MALIGNANT NEOPLASM OF LOWER-OUTER QUADRANT OF RIGHT BREAST OF FEMALE, ESTROGEN RECEPTOR POSITIVE (HCC): ICD-10-CM

## 2022-07-22 DIAGNOSIS — C50.511 MALIGNANT NEOPLASM OF LOWER-OUTER QUADRANT OF RIGHT BREAST OF FEMALE, ESTROGEN RECEPTOR POSITIVE (HCC): ICD-10-CM

## 2022-07-22 PROCEDURE — 99205 OFFICE O/P NEW HI 60 MIN: CPT | Performed by: INTERNAL MEDICINE

## 2022-07-22 RX ORDER — ANASTROZOLE 1 MG/1
1 TABLET ORAL DAILY
Qty: 90 TABLET | Refills: 1 | Status: SHIPPED | OUTPATIENT
Start: 2022-07-22
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
## 2022-08-08 ENCOUNTER — CLINICAL SUPPORT (OUTPATIENT)
Dept: RADIATION ONCOLOGY | Facility: CLINIC | Age: 62
End: 2022-08-08
Attending: RADIOLOGY
Payer: COMMERCIAL

## 2022-08-08 VITALS
WEIGHT: 196.87 LBS | OXYGEN SATURATION: 100 % | HEIGHT: 63 IN | SYSTOLIC BLOOD PRESSURE: 145 MMHG | TEMPERATURE: 98.6 F | DIASTOLIC BLOOD PRESSURE: 93 MMHG | BODY MASS INDEX: 34.88 KG/M2 | HEART RATE: 70 BPM

## 2022-08-08 DIAGNOSIS — Z17.0 MALIGNANT NEOPLASM OF LOWER-OUTER QUADRANT OF RIGHT BREAST OF FEMALE, ESTROGEN RECEPTOR POSITIVE (HCC): Primary | ICD-10-CM

## 2022-08-08 DIAGNOSIS — Z17.0 MALIGNANT NEOPLASM OF LOWER-OUTER QUADRANT OF RIGHT BREAST OF FEMALE, ESTROGEN RECEPTOR POSITIVE: Primary | ICD-10-CM

## 2022-08-08 DIAGNOSIS — C50.511 MALIGNANT NEOPLASM OF LOWER-OUTER QUADRANT OF RIGHT BREAST OF FEMALE, ESTROGEN RECEPTOR POSITIVE (HCC): Primary | ICD-10-CM

## 2022-08-08 DIAGNOSIS — C50.511 MALIGNANT NEOPLASM OF LOWER-OUTER QUADRANT OF RIGHT BREAST OF FEMALE, ESTROGEN RECEPTOR POSITIVE: Primary | ICD-10-CM

## 2022-08-08 PROCEDURE — 99024 POSTOP FOLLOW-UP VISIT: CPT | Performed by: INTERNAL MEDICINE

## 2022-08-08 PROCEDURE — G0463 HOSPITAL OUTPT CLINIC VISIT: HCPCS | Performed by: INTERNAL MEDICINE

## 2022-08-08 PROCEDURE — 99211 OFF/OP EST MAY X REQ PHY/QHP: CPT | Performed by: INTERNAL MEDICINE

## 2022-08-08 RX ORDER — FLUCONAZOLE 100 MG/1
100 TABLET ORAL DAILY
Qty: 10 TABLET | Refills: 0 | Status: SHIPPED | OUTPATIENT
Start: 2022-08-08 | End: 2022-08-18
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
## 2022-11-19 ENCOUNTER — OFFICE VISIT (OUTPATIENT)
Dept: URGENT CARE | Facility: MEDICAL CENTER | Age: 62
End: 2022-11-19

## 2022-11-19 VITALS
WEIGHT: 190 LBS | RESPIRATION RATE: 20 BRPM | HEIGHT: 63 IN | OXYGEN SATURATION: 98 % | TEMPERATURE: 98.7 F | HEART RATE: 84 BPM | BODY MASS INDEX: 33.66 KG/M2 | DIASTOLIC BLOOD PRESSURE: 82 MMHG | SYSTOLIC BLOOD PRESSURE: 159 MMHG

## 2022-11-19 DIAGNOSIS — R10.30 LOWER ABDOMINAL PAIN: Primary | ICD-10-CM

## 2022-11-19 LAB
SL AMB  POCT GLUCOSE, UA: ABNORMAL
SL AMB LEUKOCYTE ESTERASE,UA: ABNORMAL
SL AMB POCT BILIRUBIN,UA: ABNORMAL
SL AMB POCT BLOOD,UA: ABNORMAL
SL AMB POCT CLARITY,UA: CLEAR
SL AMB POCT COLOR,UA: YELLOW
SL AMB POCT KETONES,UA: ABNORMAL
SL AMB POCT NITRITE,UA: ABNORMAL
SL AMB POCT PH,UA: 6
SL AMB POCT SPECIFIC GRAVITY,UA: 1.02
SL AMB POCT URINE PROTEIN: ABNORMAL
SL AMB POCT UROBILINOGEN: ABNORMAL
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
## 2022-11-20 LAB — BACTERIA UR CULT: NORMAL

## 2022-11-30 DIAGNOSIS — G43.909 MIGRAINE WITHOUT STATUS MIGRAINOSUS, NOT INTRACTABLE, UNSPECIFIED MIGRAINE TYPE: ICD-10-CM

## 2022-11-30 NOTE — TELEPHONE ENCOUNTER
Protocol Fail  rizatriptan (MAXALT-MLT) 10 mg disintegrating tablet          Sig: Take one tablet at onset of headache  May repeat once in 2 hrs as needed      Disp:  27 tablet    Refills:  0    Start: 11/30/2022    Class: Normal    Non-formulary For: Migraine without status migrainosus, not intractable, unspecified migraine type    Last ordered: 1 year ago by Ponce Blanco DO     Neurology:  Migraine Therapy - Triptan Failed 11/30/2022 02:34 PM   Protocol Details  Last BP in normal range and within 360 days    Valid encounter within last 12 months

## 2022-12-01 RX ORDER — RIZATRIPTAN BENZOATE 10 MG/1
TABLET, ORALLY DISINTEGRATING ORAL
Qty: 27 TABLET | Refills: 0 | Status: SHIPPED | OUTPATIENT
Start: 2022-12-01

## 2022-12-05 ENCOUNTER — VBI (OUTPATIENT)
Dept: ADMINISTRATIVE | Facility: OTHER | Age: 62
End: 2022-12-05

## 2022-12-07 ENCOUNTER — TELEPHONE (OUTPATIENT)
Dept: OBGYN CLINIC | Facility: HOSPITAL | Age: 62
End: 2022-12-07

## 2022-12-07 NOTE — TELEPHONE ENCOUNTER
Hello,  Please advise if the following patient can be forced onto the schedule:    Patient: Shaq Chauhan    : 1960    MRN: [id number]    Call back #: [phone]    Insurance: James Gómez    Reason for appointment: New Issue  Trigger finger R Hand, middle, no images    Requested doctor/location:  [facility]    Thank you

## 2022-12-24 ENCOUNTER — HOSPITAL ENCOUNTER (EMERGENCY)
Facility: HOSPITAL | Age: 62
Discharge: HOME | End: 2022-12-24
Attending: EMERGENCY MEDICINE

## 2022-12-24 ENCOUNTER — APPOINTMENT (EMERGENCY)
Dept: CT IMAGING | Facility: HOSPITAL | Age: 62
End: 2022-12-24

## 2022-12-24 VITALS
DIASTOLIC BLOOD PRESSURE: 90 MMHG | TEMPERATURE: 97.3 F | HEART RATE: 77 BPM | HEIGHT: 63 IN | BODY MASS INDEX: 33.66 KG/M2 | OXYGEN SATURATION: 96 % | WEIGHT: 190 LBS | SYSTOLIC BLOOD PRESSURE: 196 MMHG | RESPIRATION RATE: 18 BRPM

## 2022-12-24 DIAGNOSIS — S09.90XA CLOSED HEAD INJURY, INITIAL ENCOUNTER: Primary | ICD-10-CM

## 2022-12-24 DIAGNOSIS — R11.0 NAUSEA: ICD-10-CM
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
## 2022-12-28 ENCOUNTER — OFFICE VISIT (OUTPATIENT)
Dept: FAMILY MEDICINE CLINIC | Facility: CLINIC | Age: 62
End: 2022-12-28

## 2022-12-28 VITALS
HEART RATE: 69 BPM | OXYGEN SATURATION: 97 % | HEIGHT: 63 IN | TEMPERATURE: 97.7 F | BODY MASS INDEX: 35.47 KG/M2 | DIASTOLIC BLOOD PRESSURE: 88 MMHG | SYSTOLIC BLOOD PRESSURE: 138 MMHG | WEIGHT: 200.2 LBS

## 2022-12-28 DIAGNOSIS — Z12.12 SCREENING FOR COLORECTAL CANCER: ICD-10-CM

## 2022-12-28 DIAGNOSIS — S06.0X0D CONCUSSION WITHOUT LOSS OF CONSCIOUSNESS, SUBSEQUENT ENCOUNTER: Primary | ICD-10-CM
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
## 2022-12-28 DIAGNOSIS — Z12.11 SCREENING FOR COLORECTAL CANCER: ICD-10-CM

## 2022-12-28 PROBLEM — S10.93XA HEMATOMA OF NECK: Status: RESOLVED | Noted: 2018-08-14 | Resolved: 2022-12-28

## 2022-12-28 PROBLEM — S20.01XA POSTTRAUMATIC HEMATOMA OF RIGHT BREAST: Status: RESOLVED | Noted: 2018-08-14 | Resolved: 2022-12-28

## 2022-12-28 NOTE — PROGRESS NOTES
Assessment/Plan: Patient should continue to monitor for any lingering or worsening symptoms of concussion  She has returned to work and is doing well  She will try to get rest when she can  She will call with any new persisting or worsening symptoms  Time spent counseling and reviewing treatment plan and coordinating care as well as documentation was 30 minutes  1  Concussion without loss of consciousness, subsequent encounter    2  Fall, subsequent encounter    3  Screening for colorectal cancer  -     Ambulatory referral for colonoscopy; Future          Subjective:      Patient ID: Aubry Castleman is a 63 y o  female  Patient seen for follow-up after recently hitting her head on the ground in a parking lot  She was subsequently seen in the emergency room last week and was found to have a hematoma  CT scan was consistent with this but showed no other significant findings  She has mild concussion symptoms including improving headache and occasional dizziness  Denies any chest pain shortness of breath neck pain or radiculopathy symptoms  The following portions of the patient's history were reviewed and updated as appropriate: allergies, current medications, past family history, past medical history, past social history, past surgical history, and problem list     Review of Systems   Constitutional: Negative  HENT: Negative  Eyes: Negative  Respiratory: Negative  Cardiovascular: Negative  Gastrointestinal: Negative  Endocrine: Negative  Genitourinary: Negative  Musculoskeletal: Negative  Skin: Negative  Allergic/Immunologic: Negative  Neurological: Positive for headaches  Hematological: Negative  Psychiatric/Behavioral: Negative            Objective:      /88 (BP Location: Left arm, Patient Position: Sitting, Cuff Size: Standard)   Pulse 69   Temp 97 7 °F (36 5 °C) (Tympanic)   Ht 5' 3" (1 6 m)   Wt 90 8 kg (200 lb 3 2 oz)   LMP  (LMP Unknown) SpO2 97%   BMI 35 46 kg/m²          Physical Exam  Vitals reviewed  Constitutional:       Appearance: She is well-developed  HENT:      Head: Normocephalic and atraumatic  Right Ear: External ear normal  Tympanic membrane is not erythematous or bulging  Left Ear: External ear normal  Tympanic membrane is not erythematous or bulging  Nose: Nose normal       Mouth/Throat:      Mouth: No oral lesions  Pharynx: No oropharyngeal exudate  Eyes:      General: No scleral icterus  Right eye: No discharge  Left eye: No discharge  Conjunctiva/sclera: Conjunctivae normal    Neck:      Thyroid: No thyromegaly  Cardiovascular:      Rate and Rhythm: Normal rate and regular rhythm  Heart sounds: Normal heart sounds  No murmur heard  No friction rub  No gallop  Pulmonary:      Effort: Pulmonary effort is normal  No respiratory distress  Breath sounds: No wheezing or rales  Chest:      Chest wall: No tenderness  Abdominal:      General: Bowel sounds are normal  There is no distension  Palpations: Abdomen is soft  There is no mass  Tenderness: There is no abdominal tenderness  There is no guarding or rebound  Musculoskeletal:         General: No tenderness or deformity  Normal range of motion  Cervical back: Normal range of motion and neck supple  Lymphadenopathy:      Cervical: No cervical adenopathy  Skin:     General: Skin is warm and dry  Coloration: Skin is not pale  Findings: No erythema or rash  Neurological:      Mental Status: She is alert and oriented to person, place, and time  Cranial Nerves: No cranial nerve deficit  Motor: No abnormal muscle tone  Coordination: Coordination normal       Deep Tendon Reflexes: Reflexes are normal and symmetric     Psychiatric:         Behavior: Behavior normal

## 2022-12-29 DIAGNOSIS — C50.511 MALIGNANT NEOPLASM OF LOWER-OUTER QUADRANT OF RIGHT BREAST OF FEMALE, ESTROGEN RECEPTOR POSITIVE (HCC): ICD-10-CM

## 2022-12-29 DIAGNOSIS — Z17.0 MALIGNANT NEOPLASM OF LOWER-OUTER QUADRANT OF RIGHT BREAST OF FEMALE, ESTROGEN RECEPTOR POSITIVE (HCC): ICD-10-CM

## 2022-12-29 RX ORDER — ANASTROZOLE 1 MG/1
TABLET ORAL
Qty: 90 TABLET | Refills: 3 | Status: SHIPPED | OUTPATIENT
Start: 2022-12-29

## 2023-01-02 DIAGNOSIS — G43.909 MIGRAINE WITHOUT STATUS MIGRAINOSUS, NOT INTRACTABLE, UNSPECIFIED MIGRAINE TYPE: ICD-10-CM

## 2023-01-02 RX ORDER — RIZATRIPTAN BENZOATE 10 MG/1
TABLET, ORALLY DISINTEGRATING ORAL
Qty: 27 TABLET | Refills: 0 | Status: SHIPPED | OUTPATIENT
Start: 2023-01-02

## 2023-01-03 ENCOUNTER — TELEPHONE (OUTPATIENT)
Dept: SURGICAL ONCOLOGY | Facility: CLINIC | Age: 63
End: 2023-01-03

## 2023-01-03 NOTE — TELEPHONE ENCOUNTER
Called and left message for patient to reschedule upcoming appointment 1/9/23 for a survivorship day  Left call back number

## 2023-01-04 ENCOUNTER — TELEMEDICINE (OUTPATIENT)
Dept: FAMILY MEDICINE CLINIC | Facility: CLINIC | Age: 63
End: 2023-01-04

## 2023-01-04 ENCOUNTER — DOCUMENTATION (OUTPATIENT)
Dept: FAMILY MEDICINE CLINIC | Facility: CLINIC | Age: 63
End: 2023-01-04

## 2023-01-04 DIAGNOSIS — U07.1 COVID-19 VIRUS INFECTION: Primary | ICD-10-CM

## 2023-01-04 RX ORDER — NIRMATRELVIR AND RITONAVIR 300-100 MG
3 KIT ORAL 2 TIMES DAILY
Qty: 30 TABLET | Refills: 0 | Status: SHIPPED | OUTPATIENT
Start: 2023-01-04 | End: 2023-01-09
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
## 2023-01-13 ENCOUNTER — TELEPHONE (OUTPATIENT)
Dept: HEMATOLOGY ONCOLOGY | Facility: CLINIC | Age: 63
End: 2023-01-13

## 2023-01-13 NOTE — TELEPHONE ENCOUNTER
Scheduling Appointment SEND TO [facility] [place]    Who Is Calling to Schedule Patient    Doctor LEA Tracey   Location [place]   Date and Time 01/24 at 1:00pm   Reason for scheduling appointment Follow up    Patient verbalized understanding   Yes

## 2023-01-24 ENCOUNTER — ONCOLOGY SURVIVORSHIP (OUTPATIENT)
Dept: SURGICAL ONCOLOGY | Facility: CLINIC | Age: 63
End: 2023-01-24

## 2023-01-24 VITALS
HEART RATE: 74 BPM | DIASTOLIC BLOOD PRESSURE: 80 MMHG | RESPIRATION RATE: 16 BRPM | OXYGEN SATURATION: 98 % | SYSTOLIC BLOOD PRESSURE: 140 MMHG | HEIGHT: 63 IN | BODY MASS INDEX: 35.44 KG/M2 | TEMPERATURE: 98.8 F | WEIGHT: 200 LBS

## 2023-01-24 DIAGNOSIS — Z12.11 ENCOUNTER FOR COLORECTAL CANCER SCREENING: ICD-10-CM

## 2023-01-24 DIAGNOSIS — Z79.811 USE OF ANASTROZOLE: ICD-10-CM

## 2023-01-24 DIAGNOSIS — Z17.0 MALIGNANT NEOPLASM OF LOWER-OUTER QUADRANT OF RIGHT BREAST OF FEMALE, ESTROGEN RECEPTOR POSITIVE (HCC): Primary | ICD-10-CM

## 2023-01-24 DIAGNOSIS — Z12.12 ENCOUNTER FOR COLORECTAL CANCER SCREENING: ICD-10-CM

## 2023-01-24 DIAGNOSIS — C50.511 MALIGNANT NEOPLASM OF LOWER-OUTER QUADRANT OF RIGHT BREAST OF FEMALE, ESTROGEN RECEPTOR POSITIVE (HCC): Primary | ICD-10-CM

## 2023-01-24 NOTE — PROGRESS NOTES
Assessment/Plan:    Diagnoses and all orders for this visit:    Malignant neoplasm of lower-outer quadrant of right breast of female, estrogen receptor positive ([facility] [address] )    Patient is a 81-year-old female that was diagnosed with right breast cancer in April 2022  Her pathology revealed invasive ductal carcinoma, %, OR 45%, HER2 negative  She underwent genetic testing which was negative  She underwent a right lumpectomy and sentinel lymph biopsy and underwent intraoperative radiation therapy  She is currently taking anastrozole  She does report associated hot flashes, arthralgias and hair loss  Breast cancer survivorship visit performed today and treatment summary and care plan were reviewed with patient  Mammogram order placed  She is not currently interested in a referral to the [facility] program   I am referring her to GI for a baseline colonoscopy  She will follow-up with her gynecologist to ensure she is up-to-date on cervical cancer screening  She will discuss the need for a DEXA scan with her medical oncologist   We will plan to see her back in 6 months for a routine follow-up or so presenting today for a breast cancer survivorship visit  chuck should the need arise  She was instructed to contact us with any changes or concerns in the interim  -     Ambulatory referral to oncology social worker; Future  -     Mammo diagnostic bilateral w 3d & cad; Future    Use of anastrozole    Encounter for colorectal cancer screening  -     Ambulatory referral to Gastroenterology;  Future        REASON FOR VISIT:   Survivorship      Previous therapy:  Oncology History   Benign [facility]ioma ([facility] )   2014 Initial Diagnosis    Benign meningioma ([facility] )     6/16/2014 Surgery    suboccipital craniotomy and C1 laminectomy- pathology was consistent with meningioma who grade 1     7/6/2016 - 7/25/2016 Radiation    Plan ID Energy Fractions Dose per Fraction (cGy) Total Dose Delivered (cGy) Elapsed Days   SRT R [id number] 6X 3 / 3 450 1,350 5   SRT R Foramen 6X 2 / 2 500 1,000 2           Malignant neoplasm of lower-outer quadrant of right breast of female, estrogen receptor positive ([facility] [address] )   4/8/2022 Biopsy    Right breast biopsy:  - Invasive mammary carcinoma of no special type (ductal)  %, W[place] 45%, HER2 1+ negative     Right axillary lymph node  - negative for carcinoma       4/19/2022 -  Cancer Staged    Staging form: Breast, AJCC 8th Edition  - Clinical stage from 4/19/2022: Stage IA (cT1c, cN0(f), cM0, G1, ER+, WV+, HER2-) - Signed by Sajan Ayers MD on 4/19/2022  Stage prefix: Initial diagnosis  Method of lymph node assessment: Core biopsy  Histologic grading system: 3 grade system       5/2022 Genetic Testing    [facility] BRCAplus STAT Panel (8 genes): ELEANOR, BRCA1, BRCA2, CDH1, CHEK2, PALB2, PTEN, TP53  Test(s): APC, AXIN2 BARD1, BRIP1, BMPR1A, CDK4, CDKN2A, DICER1, EPCAM, GREM1, HOXB13, MLH1, MSH2, MSH3, MSH6, MUTYH, NBN, NF1, NTHL1, PMS2, POLD1, POLE, RAD51C, RAD51D, RECQL SMAD4, SMARCA4, STK11      Result:   Negative - No Clinically Significant Variants Detected       6/23/2022 - 6/23/2022 Radiation    Field Numbers Energy Treatment Site Starting  Ending  Elapsed  Fraction Total  Overlap Site         Date Date Days Dose Dose     IORT  50 kVp Right Breast 6-23-22 6-23-22 0 2000 cGy 2000 cGy       Dr Tolu Szymanski     6/23/2022 Surgery    Right breast lumpectomy, [facility] (Dr Kleber Lacey), IORT  - Negative margins  - 0/2 lymph nodes       7/22/2022 -  Hormone Therapy    Anastrozole 1 mg daily    Dr Lana Moreau           Patient ID: Zak Santamaria is a 63 y o  female   Presenting today for a breast cancer survivorship visit  She reports some swelling at her lumpectomy site  She is taking anastrozole and reports associated hot flashes, arthralgias, hair loss  Denies persistent headaches, back pain, bone pain, cough, SOB, abdominal pain           Review of Systems   Constitutional: Negative for activity change, appetite change, chills, fatigue, fever and unexpected weight change  Respiratory: Negative for cough and shortness of breath  Cardiovascular: Negative for chest pain  Gastrointestinal: Negative for abdominal pain, constipation, diarrhea, nausea and vomiting  Musculoskeletal: Positive for arthralgias  Negative for back pain, gait problem and myalgias  Skin: Negative for color change and rash  Neurological: Negative for dizziness and headaches  Hematological: Negative for adenopathy  Psychiatric/Behavioral: Negative for agitation and confusion  All other systems reviewed and are negative  Objective:    Blood pressure 140/80, pulse 74, temperature 98 8 °F (37 1 °C), temperature source Temporal, resp  rate 16, height 5' 3" (1 6 m), weight 90 7 kg (200 lb), SpO2 98 %  Body mass index is 35 43 kg/m²  Physical Exam  Vitals reviewed  Constitutional:       General: She is not in acute distress  Appearance: Normal appearance  She is well-developed  She is not diaphoretic  HENT:      Head: Normocephalic and atraumatic  Cardiovascular:      Rate and Rhythm: Normal rate and regular rhythm  Heart sounds: Normal heart sounds  Pulmonary:      Effort: Pulmonary effort is normal       Breath sounds: Normal breath sounds  Chest:   Breasts:     Right: Skin change (surgical scars) present  No swelling, bleeding, inverted nipple, mass, nipple discharge or tenderness  Left: No swelling, bleeding, inverted nipple, mass, nipple discharge, skin change or tenderness  Comments: Mild fibrosis s/p IORT  Abdominal:      Palpations: Abdomen is soft  There is no mass  Tenderness: There is no abdominal tenderness  Musculoskeletal:         General: Normal range of motion  Cervical back: Normal range of motion  Lymphadenopathy:      Upper Body:      Right upper body: No supraclavicular or axillary adenopathy  Left upper body: No supraclavicular or axillary adenopathy     Skin:     General: Skin is warm and dry  Findings: No rash  Neurological:      Mental Status: She is alert and oriented to person, place, and time  Psychiatric:         Speech: Speech normal              Discussed symptoms related to disease recurrence, Yes    Evaluated for late effects related to cancer treatment, Yes, describe: discussed effects of surgery, RT, AI therapy    Screening current for cervical cancer, Yes, describe: pt will schedule annual gyn appt    Screening current for colon cancer, No Patient has never had a colonoscopy, referral to GI    Cancer rehabilitation services addressed, Yes, describe: declines referral to [facility] at this time    Screening current for osteoporosis, No Will discuss with medical oncologist     Oncology Treatment Summary reviewed with patient and copy provided, Yes    Referral placed for psychosocial evaluation/screening to oncology social work  Yes    I have spent 45 minutes with Patient  today in which greater than 50% of this time was spent in counseling/coordination of care regarding breast cancer survivorship

## 2023-01-25 ENCOUNTER — PATIENT OUTREACH (OUTPATIENT)
Dept: CASE MANAGEMENT | Facility: OTHER | Age: 63
End: 2023-01-25

## 2023-01-26 ENCOUNTER — OFFICE VISIT (OUTPATIENT)
Dept: HEMATOLOGY ONCOLOGY | Facility: CLINIC | Age: 63
End: 2023-01-26

## 2023-01-26 VITALS
DIASTOLIC BLOOD PRESSURE: 78 MMHG | RESPIRATION RATE: 18 BRPM | HEART RATE: 69 BPM | WEIGHT: 199.5 LBS | TEMPERATURE: 96.9 F | HEIGHT: 63 IN | BODY MASS INDEX: 35.35 KG/M2 | SYSTOLIC BLOOD PRESSURE: 122 MMHG | OXYGEN SATURATION: 98 %

## 2023-01-26 DIAGNOSIS — C50.511 MALIGNANT NEOPLASM OF LOWER-OUTER QUADRANT OF RIGHT BREAST OF FEMALE, ESTROGEN RECEPTOR POSITIVE (HCC): Primary | ICD-10-CM

## 2023-01-26 DIAGNOSIS — Z17.0 MALIGNANT NEOPLASM OF LOWER-OUTER QUADRANT OF RIGHT BREAST OF FEMALE, ESTROGEN RECEPTOR POSITIVE (HCC): Primary | ICD-10-CM
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
## 2023-02-01 ENCOUNTER — PATIENT OUTREACH (OUTPATIENT)
Dept: CASE MANAGEMENT | Facility: OTHER | Age: 63
End: 2023-02-01

## 2023-02-01 DIAGNOSIS — G43.909 MIGRAINE WITHOUT STATUS MIGRAINOSUS, NOT INTRACTABLE, UNSPECIFIED MIGRAINE TYPE: ICD-10-CM

## 2023-02-01 RX ORDER — RIZATRIPTAN BENZOATE 10 MG/1
TABLET, ORALLY DISINTEGRATING ORAL
Qty: 27 TABLET | Refills: 0 | Status: SHIPPED | OUTPATIENT
Start: 2023-02-01

## 2023-02-01 NOTE — PROGRESS NOTES
OSW placed outreach TC to pt this morning  OSW received pts VM  Detailed VM was left requesting a return TC  Contact information was provided

## 2023-02-06 ENCOUNTER — PATIENT OUTREACH (OUTPATIENT)
Dept: CASE MANAGEMENT | Facility: OTHER | Age: 63
End: 2023-02-06

## 2023-02-06 ENCOUNTER — TELEMEDICINE (OUTPATIENT)
Dept: FAMILY MEDICINE CLINIC | Facility: CLINIC | Age: 63
End: 2023-02-06

## 2023-02-06 DIAGNOSIS — J01.00 ACUTE NON-RECURRENT MAXILLARY SINUSITIS: Primary | ICD-10-CM

## 2023-02-06 RX ORDER — AZITHROMYCIN 250 MG/1
TABLET, FILM COATED ORAL
Qty: 6 TABLET | Refills: 0 | Status: SHIPPED | OUTPATIENT
Start: 2023-02-06 | End: 2023-02-10

## 2023-02-06 NOTE — PROGRESS NOTES
Biopsychosocial and Barriers Assessment Survivorship     Home/Cell Phone: [place]- [phone] [place]- [phone]  Emergency Contact: Jacob- [phone]  Marital Status:   Interpretation concerns, speaks another language, preferred language: English  Cultural concerns: none  Ability to read or write: yes    Housing: [employer] [place] Setup: 3 steps  Lives With: with daughter  Daily Living Activities: independent  Durable Medical Equipment: none  Ambulation: independent    Preferred Pharmacy: [facility]  Medication coverage: yes    Employment: Supervisor at [employer] Status/Location: n/a  Ability to pay bills: yes  POA/LW/AD: none    Additional Comments:  OSW placed outreach TC to pt this morning  OSW introduced self and role  Pt completed a Dt, where she scored a 2/10  She reports memory/concentration concerns, which she had prior to her cancer dx  She continues to work and is independent  Pt does not have any concerns at this time
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
## 2023-02-06 NOTE — ASSESSMENT & PLAN NOTE
New diagnosis acute symptomatic associated with sinus pressure pain cough headache postnasal drainage x1 week  Not relieved using conservative treatment such as Mucinex Flonase  Denies shortness of breath chest pain  COVID-positive beginning of January no recommendation to retest   Will recommend increase fluids vitamin C nasal saline rinses and start Z-Jozef    Educated on side effects proper use of medication call with any worsening of symptoms

## 2023-02-14 DIAGNOSIS — D32.9 BENIGN MENINGIOMA (HCC): Primary | ICD-10-CM

## 2023-02-16 ENCOUNTER — TELEPHONE (OUTPATIENT)
Dept: RADIATION ONCOLOGY | Facility: HOSPITAL | Age: 63
End: 2023-02-16

## 2023-02-16 NOTE — TELEPHONE ENCOUNTER
LM for patient to call back to schedule radiation follow up with Dr Salome Link on 3/8/23, s/p MRI brain  Attempted x2 to call patient on her cell, unable to leave message, call wouldn't go through

## 2023-02-21 ENCOUNTER — TELEPHONE (OUTPATIENT)
Dept: RADIATION ONCOLOGY | Facility: HOSPITAL | Age: 63
End: 2023-02-21

## 2023-02-21 ENCOUNTER — APPOINTMENT (OUTPATIENT)
Dept: LAB | Facility: MEDICAL CENTER | Age: 63
End: 2023-02-21

## 2023-02-21 DIAGNOSIS — D32.9 BENIGN MENINGIOMA (HCC): ICD-10-CM

## 2023-02-21 LAB
BUN SERPL-MCNC: 14 MG/DL (ref 5–25)
CREAT SERPL-MCNC: 0.81 MG/DL (ref 0.6–1.3)
GFR SERPL CREATININE-BSD FRML MDRD: 79 ML/MIN/1.73SQ M

## 2023-02-21 NOTE — TELEPHONE ENCOUNTER
Unable to leave voice message on mobile number, call wouldn't go through  LM on home number listed, requesting call back to schedule radiation follow up with Dr Eric Garza for 3/8 or 3/9/23  My direct phone number provided

## 2023-02-23 ENCOUNTER — HOSPITAL ENCOUNTER (OUTPATIENT)
Dept: MRI IMAGING | Facility: HOSPITAL | Age: 63
Discharge: HOME | End: 2023-02-23
Attending: RADIOLOGY

## 2023-02-23 DIAGNOSIS — D32.9 BENIGN MENINGIOMA (HCC): ICD-10-CM

## 2023-02-23 RX ADMIN — GADOBUTROL 9 ML: 604.72 INJECTION INTRAVENOUS at 09:13

## 2023-03-01 ENCOUNTER — TELEPHONE (OUTPATIENT)
Dept: RADIATION ONCOLOGY | Facility: HOSPITAL | Age: 63
End: 2023-03-01

## 2023-03-01 NOTE — TELEPHONE ENCOUNTER
Radiation follow up scheduled w/patient for 3/8/23 at the [facility] with Dr Rupert Givens

## 2023-03-07 ENCOUNTER — HOSPITAL ENCOUNTER (OUTPATIENT)
Dept: MAMMOGRAPHY | Facility: CLINIC | Age: 63
Discharge: HOME | End: 2023-03-07

## 2023-03-07 ENCOUNTER — HOSPITAL ENCOUNTER (OUTPATIENT)
Dept: ULTRASOUND IMAGING | Facility: CLINIC | Age: 63
Discharge: HOME | End: 2023-03-07

## 2023-03-07 VITALS — HEIGHT: 63 IN | WEIGHT: 198.41 LBS | BODY MASS INDEX: 35.16 KG/M2

## 2023-03-07 DIAGNOSIS — C50.511 MALIGNANT NEOPLASM OF LOWER-OUTER QUADRANT OF RIGHT BREAST OF FEMALE, ESTROGEN RECEPTOR POSITIVE (HCC): ICD-10-CM

## 2023-03-07 DIAGNOSIS — R92.8 ABNORMAL MAMMOGRAM: ICD-10-CM

## 2023-03-07 DIAGNOSIS — Z17.0 MALIGNANT NEOPLASM OF LOWER-OUTER QUADRANT OF RIGHT BREAST OF FEMALE, ESTROGEN RECEPTOR POSITIVE (HCC): ICD-10-CM

## 2023-03-08 ENCOUNTER — RADIATION ONCOLOGY FOLLOW-UP (OUTPATIENT)
Dept: RADIATION ONCOLOGY | Facility: HOSPITAL | Age: 63
End: 2023-03-08
Attending: RADIOLOGY

## 2023-03-08 ENCOUNTER — CLINICAL SUPPORT (OUTPATIENT)
Dept: RADIATION ONCOLOGY | Facility: HOSPITAL | Age: 63
End: 2023-03-08
Attending: RADIOLOGY

## 2023-03-08 VITALS
OXYGEN SATURATION: 96 % | HEART RATE: 88 BPM | SYSTOLIC BLOOD PRESSURE: 170 MMHG | RESPIRATION RATE: 16 BRPM | WEIGHT: 199.4 LBS | TEMPERATURE: 98.8 F | BODY MASS INDEX: 35.32 KG/M2 | DIASTOLIC BLOOD PRESSURE: 100 MMHG

## 2023-03-08 DIAGNOSIS — Z17.0 MALIGNANT NEOPLASM OF LOWER-OUTER QUADRANT OF RIGHT BREAST OF FEMALE, ESTROGEN RECEPTOR POSITIVE (HCC): ICD-10-CM

## 2023-03-08 DIAGNOSIS — D32.9 BENIGN MENINGIOMA (HCC): Primary | ICD-10-CM

## 2023-03-08 DIAGNOSIS — C50.511 MALIGNANT NEOPLASM OF LOWER-OUTER QUADRANT OF RIGHT BREAST OF FEMALE, ESTROGEN RECEPTOR POSITIVE (HCC): ICD-10-CM
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
## 2023-04-07 PROBLEM — J01.00 ACUTE NON-RECURRENT MAXILLARY SINUSITIS: Status: RESOLVED | Noted: 2023-02-06 | Resolved: 2023-04-07

## 2023-05-04 DIAGNOSIS — I10 ESSENTIAL HYPERTENSION: ICD-10-CM

## 2023-05-04 DIAGNOSIS — E03.9 HYPOTHYROIDISM, UNSPECIFIED TYPE: ICD-10-CM

## 2023-05-04 RX ORDER — HYDROCHLOROTHIAZIDE 12.5 MG/1
TABLET ORAL
Qty: 90 TABLET | Refills: 3 | Status: SHIPPED | OUTPATIENT
Start: 2023-05-04

## 2023-05-04 RX ORDER — LEVOTHYROXINE SODIUM 0.05 MG/1
TABLET ORAL
Qty: 90 TABLET | Refills: 3 | Status: SHIPPED | OUTPATIENT
Start: 2023-05-04

## 2023-05-04 RX ORDER — LOSARTAN POTASSIUM 100 MG/1
TABLET ORAL
Qty: 90 TABLET | Refills: 3 | Status: SHIPPED | OUTPATIENT
Start: 2023-05-04

## 2023-05-10 ENCOUNTER — OFFICE VISIT (OUTPATIENT)
Dept: FAMILY MEDICINE CLINIC | Facility: CLINIC | Age: 63
End: 2023-05-10

## 2023-05-10 VITALS
BODY MASS INDEX: 35.44 KG/M2 | WEIGHT: 200 LBS | OXYGEN SATURATION: 95 % | TEMPERATURE: 99.3 F | HEART RATE: 84 BPM | HEIGHT: 63 IN | SYSTOLIC BLOOD PRESSURE: 152 MMHG | DIASTOLIC BLOOD PRESSURE: 83 MMHG

## 2023-05-10 DIAGNOSIS — J40 BRONCHITIS: Primary | ICD-10-CM

## 2023-05-10 RX ORDER — BENZONATATE 200 MG/1
200 CAPSULE ORAL 3 TIMES DAILY PRN
Qty: 20 CAPSULE | Refills: 0 | Status: SHIPPED | OUTPATIENT
Start: 2023-05-10

## 2023-05-10 RX ORDER — AZITHROMYCIN 250 MG/1
TABLET, FILM COATED ORAL
Qty: 6 TABLET | Refills: 0 | Status: SHIPPED | OUTPATIENT
Start: 2023-05-10 | End: 2023-05-17

## 2023-05-10 NOTE — PROGRESS NOTES
"Assessment/Plan:     1  Bronchitis  -     benzonatate (TESSALON) 200 MG capsule; Take 1 capsule (200 mg total) by mouth 3 (three) times a day as needed for cough  -     azithromycin (ZITHROMAX) 250 mg tablet; Take 2 tablets today then 1 tablet daily x 4 days          Subjective:      Patient ID: Maddie Boyce is a 63 y o  female  Patient with cough and congestion over the last 1 week  She is done COVID testing that has been negative  Denies any fevers or chills  Cough is nonproductive  No GI complaints  Denies any shortness of breath  The following portions of the patient's history were reviewed and updated as appropriate: allergies, current medications, past family history, past medical history, past social history, past surgical history, and problem list     Review of Systems   Constitutional: Negative  Negative for fever  HENT: Positive for congestion  Eyes: Negative  Respiratory: Positive for cough  Cardiovascular: Negative  Gastrointestinal: Negative  Endocrine: Negative  Genitourinary: Negative  Musculoskeletal: Negative  Skin: Negative  Allergic/Immunologic: Negative  Neurological: Negative  Hematological: Negative  Psychiatric/Behavioral: Negative            Objective:      /83 (BP Location: Left arm, Patient Position: Sitting, Cuff Size: Large)   Pulse 84   Temp 99 3 °F (37 4 °C) (Tympanic)   Ht 5' 3\" (1 6 m)   Wt 90 7 kg (200 lb)   LMP  (LMP Unknown)   SpO2 95%   BMI 35 43 kg/m²          Physical Exam    "

## 2023-07-26 ENCOUNTER — OFFICE VISIT (OUTPATIENT)
Dept: FAMILY MEDICINE CLINIC | Facility: CLINIC | Age: 63
End: 2023-07-26
Payer: COMMERCIAL

## 2023-07-26 VITALS
HEART RATE: 74 BPM | WEIGHT: 201 LBS | SYSTOLIC BLOOD PRESSURE: 160 MMHG | BODY MASS INDEX: 36.99 KG/M2 | OXYGEN SATURATION: 98 % | TEMPERATURE: 98.5 F | HEIGHT: 62 IN | DIASTOLIC BLOOD PRESSURE: 87 MMHG

## 2023-07-26 DIAGNOSIS — R31.29 MICROSCOPIC HEMATURIA: ICD-10-CM

## 2023-07-26 DIAGNOSIS — N39.0 URINARY TRACT INFECTION WITHOUT HEMATURIA, SITE UNSPECIFIED: Primary | ICD-10-CM

## 2023-07-26 DIAGNOSIS — Z12.12 SCREENING FOR COLORECTAL CANCER: ICD-10-CM

## 2023-07-26 DIAGNOSIS — R06.00 DYSPNEA, UNSPECIFIED TYPE: ICD-10-CM

## 2023-07-26 DIAGNOSIS — Z13.820 SCREENING FOR OSTEOPOROSIS: ICD-10-CM

## 2023-07-26 DIAGNOSIS — Z12.11 SCREENING FOR COLORECTAL CANCER: ICD-10-CM

## 2023-07-26 DIAGNOSIS — Z11.4 SCREENING FOR HIV (HUMAN IMMUNODEFICIENCY VIRUS): ICD-10-CM

## 2023-07-26 DIAGNOSIS — R35.0 URINE FREQUENCY: ICD-10-CM

## 2023-07-26 LAB
SL AMB  POCT GLUCOSE, UA: NORMAL
SL AMB LEUKOCYTE ESTERASE,UA: ABNORMAL
SL AMB POCT BILIRUBIN,UA: NEGATIVE
SL AMB POCT BLOOD,UA: ABNORMAL
SL AMB POCT CLARITY,UA: ABNORMAL
SL AMB POCT COLOR,UA: YELLOW
SL AMB POCT KETONES,UA: NEGATIVE
SL AMB POCT NITRITE,UA: POSITIVE
SL AMB POCT PH,UA: 7
SL AMB POCT SPECIFIC GRAVITY,UA: 1
SL AMB POCT URINE PROTEIN: ABNORMAL
SL AMB POCT UROBILINOGEN: NORMAL

## 2023-07-26 PROCEDURE — 87086 URINE CULTURE/COLONY COUNT: CPT | Performed by: FAMILY MEDICINE

## 2023-07-26 PROCEDURE — 87186 SC STD MICRODIL/AGAR DIL: CPT | Performed by: FAMILY MEDICINE

## 2023-07-26 PROCEDURE — 87077 CULTURE AEROBIC IDENTIFY: CPT | Performed by: FAMILY MEDICINE

## 2023-07-26 PROCEDURE — 81002 URINALYSIS NONAUTO W/O SCOPE: CPT | Performed by: FAMILY MEDICINE

## 2023-07-26 PROCEDURE — 99213 OFFICE O/P EST LOW 20 MIN: CPT | Performed by: FAMILY MEDICINE

## 2023-07-26 RX ORDER — ALBUTEROL SULFATE 90 UG/1
2 AEROSOL, METERED RESPIRATORY (INHALATION) EVERY 6 HOURS PRN
Qty: 18 G | Refills: 5 | Status: SHIPPED | OUTPATIENT
Start: 2023-07-26

## 2023-07-26 RX ORDER — CIPROFLOXACIN 500 MG/1
500 TABLET, FILM COATED ORAL EVERY 12 HOURS SCHEDULED
Qty: 10 TABLET | Refills: 0 | Status: SHIPPED | OUTPATIENT
Start: 2023-07-26 | End: 2023-07-31

## 2023-07-26 NOTE — PROGRESS NOTES
Assessment/Plan: Await urine culture result. We will start Cipro. She does have some microscopic hematuria on urine dip today. Recommend repeat urinalysis again in 1 to 2 weeks. Recommend urology evaluation if still with hematuria at that time. 1. Urinary tract infection without hematuria, site unspecified  -     Urine culture; Future  -     UA (URINE) with reflex to Scope  -     ciprofloxacin (CIPRO) 500 mg tablet; Take 1 tablet (500 mg total) by mouth every 12 (twelve) hours for 5 days    2. Microscopic hematuria    3. Urine frequency  -     POCT urine dip  -     Urine culture; Future  -     UA (URINE) with reflex to Scope    4. Screening for colorectal cancer  -     Cologuard    5. Screening for HIV (human immunodeficiency virus)  -     HIV 1/2 AG/AB w Reflex [facility] for 2 yr old and above; Future    6. Screening for osteoporosis  -     DXA bone density spine hip and pelvis; Future; Expected date: 07/26/2023    7. Dyspnea, unspecified type  -     albuterol (Ventolin HFA) 90 mcg/act inhaler; Inhale 2 puffs every 6 (six) hours as needed for wheezing          Subjective:      Patient ID: Radha Torres is a 66 y.o. female. Patient with onset of urinary frequency and urgency 2 days ago. Mild burning with urination but no fever or back pain. Patient           The following portions of the patient's history were reviewed and updated as appropriate: allergies, current medications, past family history, past medical history, past social history, past surgical history, and problem list.    Review of Systems   Constitutional: Negative. HENT: Negative. Eyes: Negative. Respiratory: Negative. Cardiovascular: Negative. Gastrointestinal: Negative. Endocrine: Negative. Genitourinary: Positive for dysuria and frequency. Musculoskeletal: Negative. Skin: Negative. Allergic/Immunologic: Negative. Neurological: Negative. Hematological: Negative. Psychiatric/Behavioral: Negative. Objective:      /87 (BP Location: Left arm, Patient Position: Sitting, Cuff Size: Standard)   Pulse 74   Temp 98.5 °F (36.9 °C) (Tympanic)   Ht 5' 2" (1.575 m)   Wt 91.2 kg (201 lb)   LMP  (LMP Unknown)   SpO2 98%   BMI 36.76 kg/m²          Physical Exam  Vitals reviewed. Constitutional:       Appearance: She is well-developed. HENT:      Head: Normocephalic and atraumatic. Right Ear: External ear normal. Tympanic membrane is not erythematous or bulging. Left Ear: External ear normal. Tympanic membrane is not erythematous or bulging. Nose: Nose normal.      Mouth/Throat:      Mouth: No oral lesions. Pharynx: No oropharyngeal exudate. Eyes:      General: No scleral icterus. Right eye: No discharge. Left eye: No discharge. Conjunctiva/sclera: Conjunctivae normal.   Neck:      Thyroid: No thyromegaly. Cardiovascular:      Rate and Rhythm: Normal rate and regular rhythm. Heart sounds: Normal heart sounds. No murmur heard. No friction rub. No gallop. Pulmonary:      Effort: Pulmonary effort is normal. No respiratory distress. Breath sounds: No wheezing or rales. Chest:      Chest wall: No tenderness. Abdominal:      General: Bowel sounds are normal. There is no distension. Palpations: Abdomen is soft. There is no mass. Tenderness: There is no abdominal tenderness. There is no guarding or rebound. Musculoskeletal:         General: No tenderness or deformity. Normal range of motion. Cervical back: Normal range of motion and neck supple. Lymphadenopathy:      Cervical: No cervical adenopathy. Skin:     General: Skin is warm and dry. Coloration: Skin is not pale. Findings: No erythema or rash. Neurological:      Mental Status: She is alert and oriented to person, place, and time. Cranial Nerves: No cranial nerve deficit. Motor: No abnormal muscle tone.       Coordination: Coordination normal. Deep Tendon Reflexes: Reflexes are normal and symmetric.    Psychiatric:         Behavior: Behavior normal.

## 2023-07-28 LAB
BACTERIA UR CULT: ABNORMAL
BACTERIA UR CULT: ABNORMAL

## 2023-08-01 DIAGNOSIS — N39.0 URINARY TRACT INFECTION WITHOUT HEMATURIA, SITE UNSPECIFIED: Primary | ICD-10-CM

## 2023-08-01 RX ORDER — LEVOFLOXACIN 500 MG/1
500 TABLET, FILM COATED ORAL EVERY 24 HOURS
Qty: 5 TABLET | Refills: 0 | Status: SHIPPED | OUTPATIENT
Start: 2023-08-01 | End: 2023-08-06

## 2023-08-11 ENCOUNTER — TELEPHONE (OUTPATIENT)
Dept: HEMATOLOGY ONCOLOGY | Facility: CLINIC | Age: 63
End: 2023-08-11

## 2023-08-11 NOTE — TELEPHONE ENCOUNTER
I called [facility] regarding an appointment that they have scheduled with Dr. Keyla Krishnan scheduled on 9/5/23     I left a voicemail explaining to patient that this appointment will need to be rescheduled due to a change in the providers schedule. Patient was advised to call [facility] to reschedule. A [employer] message (if applicable) has been sent to patient relaying the above information and advising patient to call [facility] and reschedule their appointment.      Patient can R/S with NP

## 2023-08-17 ENCOUNTER — OFFICE VISIT (OUTPATIENT)
Dept: FAMILY MEDICINE CLINIC | Facility: CLINIC | Age: 63
End: 2023-08-17
Payer: COMMERCIAL

## 2023-08-17 VITALS
SYSTOLIC BLOOD PRESSURE: 138 MMHG | OXYGEN SATURATION: 98 % | DIASTOLIC BLOOD PRESSURE: 78 MMHG | TEMPERATURE: 97.3 F | BODY MASS INDEX: 34.73 KG/M2 | HEART RATE: 64 BPM | WEIGHT: 196 LBS | HEIGHT: 63 IN

## 2023-08-17 DIAGNOSIS — M67.921 TENDINOPATHY OF RIGHT BICEPS TENDON: Primary | ICD-10-CM

## 2023-08-17 DIAGNOSIS — M25.511 ACUTE PAIN OF RIGHT SHOULDER: ICD-10-CM

## 2023-08-17 PROCEDURE — 99213 OFFICE O/P EST LOW 20 MIN: CPT | Performed by: FAMILY MEDICINE

## 2023-08-17 RX ORDER — KETOROLAC TROMETHAMINE 10 MG/1
10 TABLET, FILM COATED ORAL EVERY 6 HOURS PRN
Qty: 20 TABLET | Refills: 0 | Status: SHIPPED | OUTPATIENT
Start: 2023-08-17 | End: 2023-08-22

## 2023-08-17 NOTE — PROGRESS NOTES
Family Medicine Follow-Up Office Visit  Raphael Perez 66 y.o. female   MRN: [id number] : 1960  ENCOUNTER: 2023 4:00 PM    Assessment and Plan   Tendinopathy of right biceps tendon  Patient presents with clinical signs and symptoms consistent with tendinopathy of the R biceps with possible associated R rotator cuff pathology in the setting of recent Levoquin course and falling asleep with arm over her head. Plan:  Patient is encouraged to rest the arm with cold compresses and OTC lidocaine patch as needed  Referral to PT  Oral Toradol 5 day course for management of acute pain - ibuprofen to be continued as needed following this  Referral to Sports Medicine for further evaluation as needed. Chief Complaint     Chief Complaint   Patient presents with   • Shoulder Pain     right       History of Present Illness   Raphael Perez is a 66y.o.-year-old female with PMHx of hypothyroidism, HTN, and breast CA who presents today for evaluation of R shoulder pain. She notes that Thursday last week she fell asleep with her arm over her head. Since then she has noted anterior shoulder pain with popping and clicking and pain with extension and abduction of the arm. She notes that she has not been resting it as she is preparing for a move. She has tried [employer] and she is unsure if this is helping. She notes minimal relief with ibuprofen 2 tablets every 6-8 hours as needed. She denies any associated neck pain. She notes that the pain radiates to the middle of her upper arm. Does not radiate further than that and denies numbness or tingling into the fingers. She does note that she has recently completed a course of Levaquin. Review of Systems   Review of Systems   Constitutional: Negative for chills and fever. Musculoskeletal: Positive for arthralgias and myalgias. Negative for back pain and neck pain.        Active Problem List     Patient Active Problem List   Diagnosis   • Benign meningioma [facility])   • Hypertension   • Hypothyroidism   • Thickened endometrium   • Closed fracture of manubrium   • Lymphangitis   • Hyponatremia   • Leukocytosis   • Malignant neoplasm of lower-outer quadrant of right breast of female, estrogen receptor positive ([address])   • Family history of breast cancer in mother   • Vertigo   • BRCA negative   • Use of anastrozole   • Tendinopathy of right biceps tendon       Past Medical History, Past Surgical History, Family History, and Social History were reviewed and updated today as appropriate. Objective   /78 (BP Location: Left arm, Patient Position: Sitting, Cuff Size: Standard)   Pulse 64   Temp (!) 97.3 °F (36.3 °C) (Tympanic)   Ht 5' 3" (1.6 m)   Wt 88.9 kg (196 lb)   LMP  (LMP Unknown)   SpO2 98%   BMI 34.72 kg/m²     Physical Exam  Constitutional:       Appearance: She is well-developed. HENT:      Head: Normocephalic and atraumatic. Right Ear: External ear normal.      Left Ear: External ear normal.   Eyes:      General: No scleral icterus. Conjunctiva/sclera: Conjunctivae normal.   Musculoskeletal:      Comments: Patient with tenderness with palpation of the R bicipital groove. Right sided pain with resisted supination. No pain with palpation of the midline cervical spine or the paraspinal tissues. No pain with forearm flexion. Drop arm test negative bilaterally but with some hesitancy and disjointed movement. Skin:     General: Skin is warm and dry. Neurological:      General: No focal deficit present. Mental Status: She is alert and oriented to person, place, and time.    Psychiatric:         Mood and Affect: Mood normal.         Behavior: Behavior normal.           Pertinent Laboratory/Diagnostic Studies:  Lab Results   Component Value Date    GLUCOSE 128 06/19/2014    BUN 14 02/21/2023    CREATININE 0.81 02/21/2023    CALCIUM 9.4 06/09/2022     06/19/2014    K 3.7 06/09/2022    CO2 29 06/09/2022     06/09/2022     Lab Results   Component Value Date    ALT 41 06/09/2022    AST 29 06/09/2022    ALKPHOS 70 06/09/2022       Lab Results   Component Value Date    WBC 3.90 (L) 06/09/2022    HGB 13.2 06/09/2022    HCT 39.2 06/09/2022    MCV 86 06/09/2022     06/09/2022       No results found for: "TSH"    No results found for: "CHOL"  No results found for: "TRIG"  No results found for: "HDL"  No results found for: "LDLCALC"  Lab Results   Component Value Date    HGBA1C 5.5 11/12/2019       Results for orders placed or performed in visit on 07/26/23   Urine culture    Specimen: Urine   Result Value Ref Range    Urine Culture >100,000 cfu/ml Escherichia coli (A)     Urine Culture <10,000 cfu/ml        Susceptibility    Escherichia coli - JACK     ZID Performed Yes       Amoxicillin + Clavulanate 16/8 Intermediate ug/ml     Ampicillin ($$) >16.00 Resistant ug/ml     Ampicillin + Sulbactam ($) >16/8 Resistant ug/ml     Aztreonam ($$$)  <=4 Susceptible ug/ml     Cefazolin ($) <=2.00 Susceptible ug/ml     Ciprofloxacin ($)  0.50 Intermediate ug/ml     Ertapenem ($$$) <=0.5 Susceptible ug/ml     Gentamicin ($$) <=2 Susceptible ug/ml     Levofloxacin ($) <=0.50 Susceptible ug/ml     Nitrofurantoin <=32 Susceptible ug/ml     Piperacillin + Tazobactam ($$$) <=8 Susceptible ug/ml     Tetracycline <=4 Susceptible ug/ml     Tobramycin ($) <=2 Susceptible ug/ml     Trimethoprim + Sulfamethoxazole ($$$) >2/38 Resistant ug/ml   POCT urine dip   Result Value Ref Range    LEUKOCYTE ESTERASE,UA ++++     NITRITE,UA positive     SL AMB POCT UROBILINOGEN normal     POCT URINE PROTEIN 100+++      PH,UA 7     BLOOD,++++     SPECIFIC GRAVITY,UA 1.000     KETONES,UA negative     BILIRUBIN,UA negative     GLUCOSE, UA normal      COLOR,UA yellow     CLARITY,UA cloudy        Orders Placed This Encounter   Procedures   • Ambulatory Referral to Physical Therapy   • Ambulatory Referral to Sports Medicine         Current Medications     Current Outpatient Medications   Medication Sig Dispense Refill   • acetaminophen (TYLENOL) 650 mg CR tablet Take 1 tablet (650 mg total) by mouth every 8 (eight) hours as needed for mild pain 30 tablet 0   • albuterol (Ventolin HFA) 90 mcg/act inhaler Inhale 2 puffs every 6 (six) hours as needed for wheezing 18 g 5   • anastrozole (ARIMIDEX) 1 mg tablet TAKE 1 TABLET DAILY 90 tablet 3   • benzonatate (TESSALON) 200 MG capsule Take 1 capsule (200 mg total) by mouth 3 (three) times a day as needed for cough 20 capsule 0   • Calcium 200 MG TABS Take by mouth daily     • Cholecalciferol (VITAMIN D3) 2000 units capsule Take 2,000 Units by mouth daily     • fexofenadine (ALLEGRA) 180 MG tablet Take 180 mg by mouth daily in the early morning     • fluticasone (FLONASE) 50 mcg/act nasal spray 1 spray into each nostril if needed     • hydrochlorothiazide (HYDRODIURIL) 12.5 mg tablet TAKE 1 TABLET DAILY 90 tablet 3   • ketorolac (TORADOL) 10 mg tablet Take 1 tablet (10 mg total) by mouth every 6 (six) hours as needed for moderate pain for up to 5 days 20 tablet 0   • levothyroxine 50 mcg tablet TAKE 1 TABLET DAILY 90 tablet 3   • losartan (COZAAR) 100 MG tablet TAKE 1 TABLET IN THE MORNING 90 tablet 3   • Magnesium 500 MG TABS Take by mouth daily     • meclizine (ANTIVERT) 25 mg tablet Take 1 tablet (25 mg total) by mouth 3 (three) times a day as needed for dizziness 30 tablet 0   • ondansetron (ZOFRAN) 4 mg tablet Take 4 mg by mouth every 8 (eight) hours as needed     • rizatriptan (MAXALT-MLT) 10 mg disintegrating tablet TAKE ONE TABLET AT ONSET OF HEADACHE. MAY REPEAT ONCE IN 2 HRS AS NEEDED.  27 tablet 0   • traMADol (ULTRAM) 50 mg tablet Take 1 tablet (50 mg total) by mouth every 8 (eight) hours as needed for moderate pain 9 tablet 0     Current Facility-Administered Medications   Medication Dose Route Frequency Provider Last Rate Last Admin   • diphenhydrAMINE (BENADRYL) injection 50 mg  50 mg Intramuscular Q6H PRN Meredith Slade PA-C   50 mg at 02/02/18 1440       ALLERGIES:  Allergies   Allergen Reactions   • Augmentin [Amoxicillin-Pot Clavulanate] Hives and GI Intolerance   • Oxycodone Itching       Health Maintenance     Health Maintenance   Topic Date Due   • HIV Screening  Never done   • Colorectal Cancer Screening  Never done   • Osteoporosis Screening  Never done   • Annual Physical  03/12/2019   • COVID-19 Vaccine (4 - Moderna series) 02/11/2022   • Cervical Cancer Screening  03/09/2022   • BMI: Followup Plan  02/23/2023   • ONC Colorectal Surgery Screening  07/25/2023   • Breast Cancer Survivorship Visit  07/25/2023   • ONC Cervical Cancer Screening  07/25/2023   • ONC DXA Scan  07/25/2023   • Influenza Vaccine (1) 09/01/2023   • Breast Cancer Screening: Mammogram  03/07/2024   • Depression Screening  03/08/2024   • BMI: Adult  08/17/2024   • DTaP,Tdap,and Td Vaccines (2 - Td or Tdap) 11/04/2030   • Hepatitis C Screening  Completed   • Pneumococcal Vaccine: Pediatrics (0 to 5 Years) and At-Risk Patients (6 to 59 Years)  Aged Out   • HIB Vaccine  Aged Out   • IPV Vaccine  Aged Out   • Hepatitis A Vaccine  Aged Out   • Meningococcal ACWY Vaccine  Aged Out   • HPV Vaccine  Aged Out   • ONC Physical Therapy Referral  Discontinued     Immunization History   Administered Date(s) Administered   • COVID-19 MODERNA VACC 0.5 ML IM 02/05/2021, 03/03/2021, 12/17/2021   • INFLUENZA 11/14/2015, 10/02/2017   • Influenza, injectable, quadrivalent, preservative free 0.5 mL 11/14/2019   • Influenza, recombinant, quadrivalent,injectable, preservative free 10/21/2020   • Influenza, seasonal, injectable 10/02/2012, 11/14/2015   • Tdap 11/04/2020         Ji Riddle    [email or web address]  8/17/2023  4:00 PM    Parts of this note were dictated using [employer] dictation software and may have sounds-like errors due to variation in pronunciation.

## 2023-08-17 NOTE — ASSESSMENT & PLAN NOTE
Patient presents with clinical signs and symptoms consistent with tendinopathy of the R biceps with possible associated R rotator cuff pathology in the setting of recent Levoquin course and falling asleep with arm over her head. Plan:  · Patient is encouraged to rest the arm with cold compresses and OTC lidocaine patch as needed  · Referral to PT  · Oral Toradol 5 day course for management of acute pain - ibuprofen to be continued as needed following this  · Referral to Sports Medicine for further evaluation as needed.

## 2023-08-22 ENCOUNTER — TELEPHONE (OUTPATIENT)
Dept: HEMATOLOGY ONCOLOGY | Facility: CLINIC | Age: 63
End: 2023-08-22

## 2023-08-22 NOTE — TELEPHONE ENCOUNTER
I called [facility] regarding an appointment that they have scheduled with Dr. Marixa Caruso scheduled on 9/5/23     I left a voicemail explaining to patient that this appointment will need to be rescheduled due to a change in the providers schedule. Patient was advised to call [facility] to reschedule. A [employer] message (if applicable) has been sent to patient relaying the above information and advising patient to call [facility] and reschedule their appointment.      Patient can R/S with NP.

## 2023-08-23 ENCOUNTER — TELEPHONE (OUTPATIENT)
Dept: HEMATOLOGY ONCOLOGY | Facility: CLINIC | Age: 63
End: 2023-08-23

## 2023-08-23 LAB — COLOGUARD RESULT REPORTABLE: POSITIVE

## 2023-08-23 NOTE — TELEPHONE ENCOUNTER
Appointment Change  Cancel, Reschedule, Change to Virtual      Who are you speaking with? LVM for patient    If it is not the patient, are they listed on an active communication consent form? N/A   Which provider is the appointment scheduled with? Dr. Woody Mcfarlane   When is the appointment scheduled? Please list date and time  09/05/23 9:15AM   At which location is the appointment scheduled to take place? [facility]   Was the appointment rescheduled or changed from an in person visit to a virtual visit? If so, please list the details of the change. No, LVM for patient after 3 attempts to reschedule explaining  that her appointment has been cancelled. Advised patient to call and reschedule. [employer] message and letter sent to patients home address. What is the reason for the appointment change? Provider   Was STAR transport scheduled for this visit? No   Does STAR transport need to be scheduled for the new visit (if applicable) N/A   Does the patient need an infusion appointment rescheduled? No   Does the patient have an infusion appointment scheduled? If so, when? No   Is the patient undergoing chemotherapy? No   Was the no-show policy reviewed for appointments being changed with less then 24 hours of notice?  N/A

## 2023-08-23 NOTE — LETTER
[address] [facility]. [address], [address]  Phone: [phone]  Fax: [phone]      August 23, 2023      Dear Sameer Montgomery office has attempted to contact you several times regarding your appointment with Dr. Mitzy Tellez scheduled 09/05/23. We have made multiple attempts to reach you regarding this appointment, but have been unsuccessful in contacting you by phone. Due to a change in the providers schedule, this appointment has been cancelled. At your earliest convenience, please contact the [facility] at [phone] and we would be happy to assist you in rescheduling your appointment. Sincerely,  Corinne C. [facility]
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
## 2023-08-24 ENCOUNTER — OFFICE VISIT (OUTPATIENT)
Dept: OBGYN CLINIC | Facility: CLINIC | Age: 63
End: 2023-08-24
Payer: COMMERCIAL

## 2023-08-24 VITALS
WEIGHT: 196 LBS | HEIGHT: 63 IN | DIASTOLIC BLOOD PRESSURE: 72 MMHG | SYSTOLIC BLOOD PRESSURE: 130 MMHG | BODY MASS INDEX: 34.73 KG/M2

## 2023-08-24 DIAGNOSIS — M67.921 TENDINOPATHY OF RIGHT BICEPS TENDON: ICD-10-CM

## 2023-08-24 DIAGNOSIS — M25.511 ACUTE PAIN OF RIGHT SHOULDER: ICD-10-CM

## 2023-08-24 DIAGNOSIS — R19.5 POSITIVE COLORECTAL CANCER SCREENING USING COLOGUARD TEST: Primary | ICD-10-CM

## 2023-08-24 DIAGNOSIS — M75.21 BICEPS TENDINITIS OF RIGHT UPPER EXTREMITY: Primary | ICD-10-CM

## 2023-08-24 DIAGNOSIS — M25.511 RIGHT SHOULDER PAIN, UNSPECIFIED CHRONICITY: ICD-10-CM

## 2023-08-24 DIAGNOSIS — M77.8 SUBSCAPULARIS TENDINITIS OF RIGHT SHOULDER: ICD-10-CM

## 2023-08-24 PROCEDURE — 99213 OFFICE O/P EST LOW 20 MIN: CPT | Performed by: FAMILY MEDICINE

## 2023-08-24 NOTE — PATIENT INSTRUCTIONS
Rotator Cuff Injury Exercises   WHAT YOU NEED TO KNOW:   What do I need to know about rotator cuff injury exercises? Exercises help improve shoulder movement and strength, and decrease pain. Your physical therapist or healthcare provider will tell you when to start doing the exercises. He or she will tell you how often to do them. You will need to start slowly to prevent more injury. You will move through several levels over time as you get stronger and more flexible. What are some safety guidelines to follow? Always warm up before you do the exercises. Walk or ride a stationary bike for 5 to 10 minutes to help you warm up. Do not put your arm over your head until directed. You will need to wait until your injury has healed. The movement of some exercises could continue until your arm is over your head. You will need to stop the movement where directed. Stop if you feel pain. You may feel some tight or stiff areas when you start. This should get better as you continue the exercises. You should not feel pain. Pain means you are not ready to do the exercise yet. Stop and call your physical therapist or healthcare provider right away. Always work both rotator cuffs. Even if your injury is only on 1 side, it is important to do each exercise on both sides. This helps prevent injury and maintain balance in your shoulders and back. Use correct posture. Your physical therapist or healthcare provider will show you the proper posture for each exercise. You will be shown how to pull your shoulders back and down to engage the correct muscles. Remember not to let your shoulders shrug during an exercise unless it is part of the movement. How should I do stretching exercises? You will not feel every exercise in your shoulder area. You may feel some of the stretches in your back, side, or upper arm muscles. You need to work muscles in and around your rotator cuffs and down your arms.  This helps stabilize your shoulders. Your physical therapist or healthcare provider will tell you how long to hold each stretch. He or she will also tell you how many times to repeat each stretch during an exercise session. You may be told to do only certain exercises, or to do them in a specific order. The following are general directions to help you remember the exercises you are taught:  Pendulum swings:  Lean forward and rest your arm on a table. Do not round your back or lock your knees during the exercise. Let your other arm hang freely by your side. Gently swing your free arm forward and backward, side to side, and in circles. Crossover arm stretch:  Relax your shoulders. Hold your upper arm with the opposite hand. Pull your arm across your chest until you feel a stretch. Hold the stretch. Return to the starting position. Sleeper stretch:  Lie on your side on a firm, flat surface. Bend the elbow of your top arm 90°. Use your other hand to slowly push your arm down. Stop when you feel a stretch at the back of your shoulder. Hold the stretch. Slowly return to the starting position. Shoulder movement, up and down: This exercise may also be called shoulder extension. Sit in a chair that has a back but no armrests. Raise your arm like you are reaching for the wall in front of you. Continue to raise the arm until it is over your head, or as high as directed. Bring your arm back down to your side. Bring it back as far as possible behind your body. Return your arm to the starting position. Shoulder movement, side to side: These movements may be called flexion, internal rotation, and external rotation. Sit in a chair that has a back but no armrests. Raise your arm to the side and then up over your head as far as directed. Return your arm to your side. Bring your arm across the front of your body and reach for the opposite shoulder. Return your arm to the starting position.          Shoulder rolls:  Stand and raise both shoulders toward your ears. Lower them to the starting position. Relax your shoulders. Pull your shoulders back. Then relax them again. Roll your shoulders in a smooth [place]. Then roll your shoulders in a smooth [place] in the other direction. Wall reach exercise: This may also be called a flexion stretch. Stand facing a wall. Slowly walk your fingers up the wall until you feel a stretch. Hold the stretch. Return to the starting position. Arm reach exercise:  Lie on your back with your legs straight. Reach your arms like you are trying to touch the ceiling. Reach as high as you can so you feel a stretch in the back of your arms. Hold the stretch. Then lower your arms to your sides. Elbow bends:  Stand with your arms down to your sides. Keep your palm facing forward. Bend your elbow and try to touch your shoulder with your fingertips. Return your arm to the starting position. Up the back stretch:  Stand and put both arms behind your back. Put one hand under the other. Move the bottom hand and slowly push the upper hand up toward your head. You should feel a stretch in the front of your arm and shoulder. Be careful not to push too hard. Hold the stretch. Then return to the starting position. Triceps stretch:  Stand and drop your forearm down your back so your hand is pointing to the ground behind you. Your elbow should be pointing at the ceiling. Take your other hand and place it on your elbow. Gently and slowly push on the elbow until you feel a stretch in the back of your arm. Hold the stretch. Let go of your elbow and relax your arm. You may be shown how to do this stretch with a towel. The towel can be pulled gently with a hand behind your back at waist level. How should I do strengthening exercises? Strengthening exercises may include handheld weights or resistance bands.  Your physical therapist or healthcare provider will tell you how much weight or resistance to use. The general guide is to use light weights or low resistance and to do a high number of repetitions. You may be told to do only certain exercises, or to do them in a specific order. The following are general directions to help you remember the exercises you are taught:  Scapular squeeze:  Stand with your arms at your sides. Squeeze your shoulder blades together and hold this position. Then relax the muscles. Keep your shoulder back during the entire exercise. Wall pushups: This exercise is similar to a pushup done on the ground. The goal is to use your back and shoulder muscles to bring your upper body toward and away from the wall. Stand facing a wall. Put your hands on the wall. Bend your elbows to bring your upper body toward the wall. Straighten your arms to return to the starting position. Keep your feet close enough to the wall that you do not take a step when you bend your elbows. Standing row with exercise band:  Wrap the exercise band around a heavy, stable object at waist level. Make loop in the end of the band to create a handle, if needed. Hold the handle or loop and pull the band straight back toward your hip. Keep your shoulder down. Squeeze your shoulder blade. Hold this position. Then slowly return to the starting position. External rotation with arm abducted 90 degrees:  Wrap the exercise band around a heavy, stable object at waist level. Make loop in the end of the band to create a handle, if needed. Stand and hold the handle or loop. Bend your elbow and raise your arm to shoulder height. Keep your arm in this position. Raise your hand like you are pointing at the ceiling. Slowly return to the starting position. You may also be shown how to do this exercise lying down and with a weight. Shoulder abduction with weight:  Stand and hold a weight in your hand with your palm facing your body.  Slowly raise your arm to the side with your thumb pointing up. Then raise your arm as far as you can without pain. Hold this position. Then return to the starting position. Shoulder abduction with exercise band:  Wrap the exercise band around a heavy, stable object near your foot. Grab the band. Keep your arm straight. Slowly raise your arm to the side with your thumb pointing up. Then, slowly pull the band as far as you can without pain. Slowly return to the starting position. Shoulder adduction with weight:  Lie on your back on a firm surface. Hold a weight in your hand at your shoulder. Slowly raise your arm toward the ceiling and straighten your elbow. Hold this position. Then slowly return to the starting position. Shoulder adduction with exercise band:  Wrap the exercise band around a heavy, stable object. Stand and face away from where the band is anchored. Hold each end of the band in both hands with your elbows bent. Your elbows should not be behind your body. Keep your arms parallel to the floor and slowly straighten your elbows. Hold this position. Slowly return to the starting position. When should I call my doctor or physical therapist?   You have sharp or worsening pain during exercise or at rest.    You have questions or concerns about your rotator cuff injury exercises. CARE AGREEMENT:   You have the right to help plan your care. Learn about your health condition and how it may be treated. Discuss treatment options with your healthcare providers to decide what care you want to receive. You always have the right to refuse treatment. The above information is an  only. It is not intended as medical advice for individual conditions or treatments. Talk to your doctor, nurse or pharmacist before following any medical regimen to see if it is safe and effective for you.   © Copyright [employer] 2022 Information is for End User's use only and may not be sold, redistributed or otherwise used for commercial purposes.

## 2023-08-24 NOTE — LETTER
August 24, 2023     Duy Jones, [address]  [id number]    Patient: Jonh Cerna   YOB: 1960   Date of Visit: 8/24/2023       Dear Dr. Roseline Avendano:    Thank you for referring Lety Ohm to me for evaluation. Below are the relevant portions of my assessment and plan of care. If you have questions, please do not hesitate to call me. I look forward to following Gema De La Cruz along with you.          Sincerely,        Gareth Siu,         CC: No Recipients

## 2023-08-28 ENCOUNTER — PREP FOR PROCEDURE (OUTPATIENT)
Age: 63
End: 2023-08-28

## 2023-08-28 ENCOUNTER — TELEPHONE (OUTPATIENT)
Age: 63
End: 2023-08-28

## 2023-08-28 DIAGNOSIS — Z12.11 SCREENING FOR COLON CANCER: Primary | ICD-10-CM

## 2023-08-28 NOTE — TELEPHONE ENCOUNTER
[address] [facility] [address] Assessment    Name: Perla Scott  YOB: 1960  Last Height: 5' 3" (1.6 m)  Last weight: 88.9 kg (196 lb)  BMI: 34.72 kg/m²  Procedure: Colonoscopy  Diagnosis: Screening  Date of procedure: 9/13/23  Prep: Orly  Responsible : Kendell  Phone#: [phone]  Name completing form: Misbah Kathleen  Date form completed: 08/28/23      If the patient answers yes to any of these questions, schedule in a hospital  Are you pregnant: No  Do you rely on a wheelchair for mobility: No  Have you been diagnosed with End Stage Renal Disease (ESRD): No  Do you need oxygen during the day: No  Have you had a heart attack or stroke within the past three months: No  Have you had a seizure within the past three months: No  Have you ever been informed by anesthesia that you have a difficult airway: No  Additional Questions  Have you had any cardiac testing or are under the care of a Cardiologist (see cardiac list): No  Cardiac list:   Do you have an implanted cardiac defibrillator: No (Comment:  This patient should be scheduled in the hospital)    Have any bleeding problems, such as anemia or hemophilia (If patient has H&H result below 8, schedule in hospital.  H&H must be within 30 days of procedure): No    Had an organ transplant within the past 3 months: No    Do you have any present infections: No  Do you get short of breath when walking a few blocks: No  Have you been diagnosed with diabetes: No  Comments (provide cardiac provider information if applicable):

## 2023-08-29 ENCOUNTER — EVALUATION (OUTPATIENT)
Dept: PHYSICAL THERAPY | Facility: CLINIC | Age: 63
End: 2023-08-29
Payer: COMMERCIAL

## 2023-08-29 DIAGNOSIS — M67.921 TENDINOPATHY OF RIGHT BICEPS TENDON: Primary | ICD-10-CM

## 2023-08-29 DIAGNOSIS — M25.511 ACUTE PAIN OF RIGHT SHOULDER: ICD-10-CM

## 2023-08-29 PROCEDURE — 97161 PT EVAL LOW COMPLEX 20 MIN: CPT

## 2023-08-29 NOTE — PROGRESS NOTES
PT Evaluation     Today's date: 2023  Patient name: Echo Orlando  : 1960  MRN: [id number]  Referring provider: John Loredo DO  Dx:   Encounter Diagnosis     ICD-10-CM    1. Tendinopathy of right biceps tendon  M67.921 Ambulatory Referral to Physical Therapy      2. Acute pain of right shoulder  M25.511 Ambulatory Referral to Physical Therapy          Start Time: 935          Assessment  Assessment details: Echo Orlando is a 66 y.o. female presents with R shoulder pain, BLHT tendinopathy. Echo Orlando has the above listed impairments and will benefit from skilled PT to improve deficits to return to prior level of function. Echo Orlando was educated on eval findings and plan for management, shoulder anatomy, need for erect posture, safe sleeping positions. HEP initiated. [facility] would benefit from skilled services to improve ROM, strength, flexibility, and function, and to decrease pain. Impairments: abnormal or restricted ROM, activity intolerance, impaired physical strength, lacks appropriate home exercise program, pain with function, scapular dyskinesis and poor posture   Understanding of Dx/Px/POC: good   Prognosis: good    Goals  Impairment Goals  - Pt I with initial HEP in 1-2 visits  - Improve ROM equal to [facility] in 4-6 weeks  - Increase strength to 5/5 in all affected areas in 4-6 weeks    Functional Goals  - Increase Functional Status Measure to: 71 (score 54 at eval)  - Patient will be independent with comprehensive HEP in 6-8 weeks  - Patient will be able to reach for object from shelf at shoulder height with min reports of difficulty in 2-4 weeks  - Patient will be able to reach for object overhead with min to difficulty in 6-8 weeks  - Patient will be able to squeeze objects without provocation of symptoms in 6-8 weeks          Plan  Plan details: Pt opting for PT 1x/wk due to high insurance co-pay. Limited tx frequency may prolong recovery.   Patient would benefit from: skilled physical therapy  Planned modality interventions: cryotherapy  Planned therapy interventions: joint mobilization, manual therapy, neuromuscular re-education, patient education, postural training, strengthening, stretching, therapeutic activities, therapeutic exercise, flexibility and home exercise program  Frequency: 1x week  Duration in visits: 4  Duration in weeks: 4  Treatment plan discussed with: patient        Subjective Evaluation    History of Present Illness  Mechanism of injury: Pt reports falling into a "deep sleep" with RUE OH, "it was stiff. I was packing boxes, we're moving. I should have rested it but I didn't and it just got worse."  Onset nearly 3 wks ago. No PSH R shoulder. Chief complaint/functional limitation OH and fwd reach with RUE. Pain anterior and posterior aspect R shoulder, "it depends on the motion". Patient Goals  Patient goals for therapy: decreased pain, independence with ADLs/IADLs, increased motion and increased strength    Pain  Current pain ratin  At best pain ratin  At worst pain rating: 3  Location: Anterior and posterior aspect R shoulder  Quality: sharp and dull ache  Relieving factors: heat  Aggravating factors: overhead activity ("Squeezing" using UEs into Adduction)    Social Support  Steps to enter house: yes  3  Stairs in house: no   Lives in: [place] house  Lives with: adult children    Employment status: working (Supervisor, prolonged walking)  Hand dominance: right      Diagnostic Tests  X-ray: normal  Treatments  Previous treatment: medication        Objective    Posture: slouched sitting posture. Forward head and shoulders, decreased thoracic kyphosis. OH reach:  R: 140 deg, shoulder pain, inferior angle dysfunction scapula (IAD)  L: 170 deg, IAD scapula    Shoulder A/PROM:  R: flexion 139 deg, R scapular pain.  deg, cavitation, R scapular pain. ER 95 deg, IR 37 deg. L: flexion 173 deg,  deg,  deg, IR 35 deg.     MMT: B empty can, full can 5/5. ER: R 4/5, pain, anterior humeral head displacement, L 5/5. IR: B 5/5. Special tests: these (+) R, (-) L: Neer impingement, Speed's. Drop arm, empty can (-) B. Palpation: R BLHT TTP, chief complaint. R supraspinatus tendon non-TTP.          Precautions: [facility] [place]      Manuals 8/29 /23            [address] [place] mobs post, dist, caudal             Man str R F,A,IR                                       Neuro Re-Ed             Scap retract 10"x15                                                                                          Ther Ex             Table slides F,scapt F 10"x15            UBE for ROM             Core row             Sleeper IR str             S/L ER             Scapt w ER                                       Ther Activity             Wall slide F,A                                                                 Modalities             Ice

## 2023-09-05 ENCOUNTER — OFFICE VISIT (OUTPATIENT)
Dept: PHYSICAL THERAPY | Facility: CLINIC | Age: 63
End: 2023-09-05
Payer: COMMERCIAL

## 2023-09-05 DIAGNOSIS — M25.511 ACUTE PAIN OF RIGHT SHOULDER: ICD-10-CM

## 2023-09-05 DIAGNOSIS — M67.921 TENDINOPATHY OF RIGHT BICEPS TENDON: Primary | ICD-10-CM

## 2023-09-05 PROCEDURE — 97140 MANUAL THERAPY 1/> REGIONS: CPT

## 2023-09-05 PROCEDURE — 97110 THERAPEUTIC EXERCISES: CPT

## 2023-09-05 NOTE — PROGRESS NOTES
Daily Note     Today's date: 2023  Patient name: Kirill Barlow  : 1960  MRN: [id number]  Referring provider: Marlene Lucas DO  Dx:   Encounter Diagnosis     ICD-10-CM    1. Tendinopathy of right biceps tendon  M67.921       2. Acute pain of right shoulder  M25.511                      Subjective: "Not too bad."  R shoulder pain 0/10. Objective: See treatment diary below      Assessment: Tolerated treatment well. Patient would benefit from continued PT. HEP progressed. Plan: Continue per plan of care.       Precautions: [facility] [place]      Manuals            GH mobs post, dist, caudal  G3           Man str R F,A,IR  20"x5                                     Neuro Re-Ed             Scap retract 10"x15                                                                                          Ther Ex             Table slides F,scapt F 10"x15 10"x 20 ea           UBE for ROM  5'           Core row  blk 2x10           Sleeper IR str  20"x5           S/L ER  2x10 1#           Scapt w ER  2x10                                     Ther Activity             Wall slide F,A                                                                 Modalities             Ice

## 2023-09-12 ENCOUNTER — ANESTHESIA (OUTPATIENT)
Dept: ANESTHESIOLOGY | Facility: HOSPITAL | Age: 63
End: 2023-09-12

## 2023-09-12 ENCOUNTER — APPOINTMENT (OUTPATIENT)
Dept: PHYSICAL THERAPY | Facility: CLINIC | Age: 63
End: 2023-09-12
Payer: COMMERCIAL

## 2023-09-12 ENCOUNTER — ANESTHESIA EVENT (OUTPATIENT)
Dept: ANESTHESIOLOGY | Facility: HOSPITAL | Age: 63
End: 2023-09-12

## 2023-09-12 RX ORDER — SODIUM CHLORIDE 9 MG/ML
125 INJECTION, SOLUTION INTRAVENOUS CONTINUOUS
Status: CANCELLED | OUTPATIENT
Start: 2023-09-12

## 2023-09-12 NOTE — ANESTHESIA PREPROCEDURE EVALUATION
Procedure:  PRE-OP ONLY    Relevant Problems   ANESTHESIA (within normal limits)      CARDIO   (+) Hypertension      ENDO   (+) Hypothyroidism      GI/HEPATIC (within normal limits)      /RENAL (within normal limits)      GYN   (+) Malignant neoplasm of lower-outer quadrant of right breast of female, estrogen receptor positive (HCC)      HEMATOLOGY (within normal limits)      MUSCULOSKELETAL (within normal limits)      NEURO/PSYCH (within normal limits)      PULMONARY (within normal limits)             Anesthesia Plan  ASA Score- 2     Anesthesia Type- IV sedation with anesthesia with ASA Monitors. Additional Monitors:   Airway Plan:           Plan Factors-Exercise tolerance (METS): >4 METS. Chart reviewed. Patient summary reviewed. Patient is not a current smoker. Induction- intravenous. Postoperative Plan-     Informed Consent- Anesthetic plan and risks discussed with patient.

## 2023-09-13 ENCOUNTER — HOSPITAL ENCOUNTER (OUTPATIENT)
Dept: GASTROENTEROLOGY | Facility: MEDICAL CENTER | Age: 63
Setting detail: OUTPATIENT SURGERY
Discharge: HOME | End: 2023-09-13
Attending: INTERNAL MEDICINE
Payer: COMMERCIAL

## 2023-09-13 ENCOUNTER — ANESTHESIA (OUTPATIENT)
Dept: GASTROENTEROLOGY | Facility: MEDICAL CENTER | Age: 63
End: 2023-09-13

## 2023-09-13 ENCOUNTER — ANESTHESIA EVENT (OUTPATIENT)
Dept: GASTROENTEROLOGY | Facility: MEDICAL CENTER | Age: 63
End: 2023-09-13

## 2023-09-13 VITALS
DIASTOLIC BLOOD PRESSURE: 84 MMHG | WEIGHT: 195.99 LBS | HEART RATE: 70 BPM | TEMPERATURE: 98 F | RESPIRATION RATE: 20 BRPM | SYSTOLIC BLOOD PRESSURE: 156 MMHG | OXYGEN SATURATION: 100 % | HEIGHT: 63 IN | BODY MASS INDEX: 34.73 KG/M2

## 2023-09-13 DIAGNOSIS — Z12.11 SCREENING FOR COLON CANCER: ICD-10-CM

## 2023-09-13 PROCEDURE — 88305 TISSUE EXAM BY PATHOLOGIST: CPT | Performed by: STUDENT IN AN ORGANIZED HEALTH CARE EDUCATION/TRAINING PROGRAM

## 2023-09-13 RX ORDER — LIDOCAINE HYDROCHLORIDE 20 MG/ML
INJECTION, SOLUTION EPIDURAL; INFILTRATION; INTRACAUDAL; PERINEURAL AS NEEDED
Status: DISCONTINUED | OUTPATIENT
Start: 2023-09-13 | End: 2023-09-13

## 2023-09-13 RX ORDER — PROPOFOL 10 MG/ML
INJECTION, EMULSION INTRAVENOUS AS NEEDED
Status: DISCONTINUED | OUTPATIENT
Start: 2023-09-13 | End: 2023-09-13

## 2023-09-13 RX ORDER — SODIUM CHLORIDE 9 MG/ML
125 INJECTION, SOLUTION INTRAVENOUS CONTINUOUS
Status: DISCONTINUED | OUTPATIENT
Start: 2023-09-13 | End: 2023-09-17 | Stop reason: HOSPADM

## 2023-09-13 RX ADMIN — PROPOFOL 50 MG: 10 INJECTION, EMULSION INTRAVENOUS at 09:45

## 2023-09-13 RX ADMIN — LIDOCAINE HYDROCHLORIDE 80 MG: 20 INJECTION, SOLUTION EPIDURAL; INFILTRATION; INTRACAUDAL at 09:39

## 2023-09-13 RX ADMIN — Medication 40 MG: at 09:43

## 2023-09-13 RX ADMIN — PROPOFOL 50 MG: 10 INJECTION, EMULSION INTRAVENOUS at 09:42

## 2023-09-13 RX ADMIN — SODIUM CHLORIDE 125 ML/HR: 0.9 INJECTION, SOLUTION INTRAVENOUS at 09:33

## 2023-09-13 RX ADMIN — PROPOFOL 100 MG: 10 INJECTION, EMULSION INTRAVENOUS at 09:39

## 2023-09-13 RX ADMIN — PROPOFOL 50 MG: 10 INJECTION, EMULSION INTRAVENOUS at 09:51

## 2023-09-13 NOTE — ANESTHESIA PREPROCEDURE EVALUATION
Procedure:  COLONOSCOPY    Relevant Problems   ANESTHESIA (within normal limits)      CARDIO   (+) Hypertension      ENDO   (+) Hypothyroidism      GI/HEPATIC (within normal limits)      /RENAL (within normal limits)      GYN   (+) Malignant neoplasm of lower-outer quadrant of right breast of female, estrogen receptor positive (HCC)      HEMATOLOGY (within normal limits)      MUSCULOSKELETAL (within normal limits)      NEURO/PSYCH (within normal limits)      PULMONARY (within normal limits)        Physical Exam    Airway    Mallampati score: III  TM Distance: >3 FB  Neck ROM: full     Dental   No notable dental hx     Cardiovascular  Rhythm: regular, Rate: normal, Cardiovascular exam normal    Pulmonary  Pulmonary exam normal Breath sounds clear to auscultation,     Other Findings        Anesthesia Plan  ASA Score- 2     Anesthesia Type- IV sedation with anesthesia with ASA Monitors. Additional Monitors:   Airway Plan:           Plan Factors-Exercise tolerance (METS): >4 METS. Chart reviewed. Patient summary reviewed. Patient is not a current smoker. Induction- intravenous. Postoperative Plan-     Informed Consent- Anesthetic plan and risks discussed with patient.

## 2023-09-13 NOTE — ANESTHESIA POSTPROCEDURE EVALUATION
Post-Op Assessment Note    CV Status:  Stable    Pain management: adequate     Mental Status:  Alert and awake   Hydration Status:  Euvolemic   PONV Controlled:  Controlled   Airway Patency:  Patent      Post Op Vitals Reviewed: Yes      Staff: Anesthesiologist         No notable events documented.     /84 (09/13/23 1013)    Temp      Pulse 70 (09/13/23 1013)   Resp 20 (09/13/23 1013)    SpO2 100 % (09/13/23 1013)    /84   Pulse 70   Temp 98 °F (36.7 °C) (Temporal)   Resp 20   Ht 5' 3" (1.6 m)   Wt 88.9 kg (195 lb 15.8 oz)   LMP  (LMP Unknown)   SpO2 100%   BMI 34.72 kg/m²
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
## 2023-09-14 ENCOUNTER — OFFICE VISIT (OUTPATIENT)
Dept: PHYSICAL THERAPY | Facility: CLINIC | Age: 63
End: 2023-09-14
Payer: COMMERCIAL

## 2023-09-14 DIAGNOSIS — M25.511 ACUTE PAIN OF RIGHT SHOULDER: ICD-10-CM

## 2023-09-14 DIAGNOSIS — M67.921 TENDINOPATHY OF RIGHT BICEPS TENDON: Primary | ICD-10-CM

## 2023-09-14 PROCEDURE — 97110 THERAPEUTIC EXERCISES: CPT

## 2023-09-14 PROCEDURE — 97140 MANUAL THERAPY 1/> REGIONS: CPT

## 2023-09-14 NOTE — PROGRESS NOTES
Daily Note     Today's date: 2023  Patient name: Stefano Martinez  : 1960  MRN: [id number]  Referring provider: Kalani Jay DO  Dx:   Encounter Diagnosis     ICD-10-CM    1. Tendinopathy of right biceps tendon  M67.921       2. Acute pain of right shoulder  M25.511           Start Time: 0857          Subjective: R shoulder pain 0/10, "it's getting better."  Notes difficulty with sleeper stretch technique for HEP. Objective: See treatment diary below      Assessment: Tolerated treatment well. Patient would benefit from continued PT. Verbal and tactile cues for sleeper stretch. HEP progressed, pain improved. Plan: Continue per plan of care.       Precautions: PMH CA      Manuals           [facility] mobs post, dist, caudal  G3 G3          Man str R F,A,IR  20"x5 x5                                    Neuro Re-Ed             Scap retract 10"x15                                                                                          Ther Ex             Table slides F,scapt F 10"x15 10"x 20 ea x30 ea Scapt only         UBE for ROM  5' 7' 10'         Core row  blk 2x10 3x10          Sleeper IR str  20"x5 x5          S/L ER  2x10 1# 3x10          Scapt w ER  2x10 3x10 1#                                   Ther Activity             Wall slide F,A   F 10"x 15                                                              Modalities             Ice

## 2023-09-15 PROCEDURE — 88305 TISSUE EXAM BY PATHOLOGIST: CPT | Performed by: STUDENT IN AN ORGANIZED HEALTH CARE EDUCATION/TRAINING PROGRAM

## 2023-09-19 ENCOUNTER — OFFICE VISIT (OUTPATIENT)
Dept: PHYSICAL THERAPY | Facility: CLINIC | Age: 63
End: 2023-09-19
Payer: COMMERCIAL

## 2023-09-19 DIAGNOSIS — M25.511 ACUTE PAIN OF RIGHT SHOULDER: ICD-10-CM

## 2023-09-19 DIAGNOSIS — M67.921 TENDINOPATHY OF RIGHT BICEPS TENDON: Primary | ICD-10-CM

## 2023-09-19 PROCEDURE — 97140 MANUAL THERAPY 1/> REGIONS: CPT

## 2023-09-19 PROCEDURE — 97110 THERAPEUTIC EXERCISES: CPT

## 2023-09-19 PROCEDURE — 97164 PT RE-EVAL EST PLAN CARE: CPT

## 2023-09-19 NOTE — PROGRESS NOTES
PT DISCHARGE    Today's date: 2023  Patient name: Yancy Bucio  : 1960  MRN: [id number]  Referring provider: Derian Omer DO  Dx:   Encounter Diagnosis     ICD-10-CM    1. Tendinopathy of right biceps tendon  M67.921       2. Acute pain of right shoulder  M25.511                      Subjective: R shoulder pain 0/10, pt requesting D/C to HEP today due to busy schedule. Pleased with progress, "I know it will still take some time."  Squeezing objects has gotten better. Repetitive OH activities still with mild difficulty. Objective: See treatment diary below    RE-EVALUATION:    Pain: 0-1/10 R shoulder (0-3/10 at eval 23)    FOTO functional score 72, projected score 71. Score at eval 54. Higher score = higher function. OH reach: R 167 deg, no pain (140 deg with pain at eval)    R shoulder A/PROM: flexion 180 deg, pain at end range (139 deg with pain at eval).  deg (129 deg with cavitation, pain at eval).  deg (95 deg at eval). IR 57 deg (37 deg at eval). MMT: R ER: 5/5 and pain free (4/5 with pain at eval). Assessment: Tolerated treatment well. Patient exhibited good technique with therapeutic exercises    Yancy Bucio has been compliant with attending PT and home exercise program since initial eval.  Zoë Krishnan  has made improvements in objective data since initial evalulation and has achieved all goals. Patient reports having returned to their prior level of function. It was mutually agreed to Discharge to home exercise program at this time.     Goals  Impairment Goals  - Pt I with initial HEP in 1-2 visits - met  - Improve ROM equal to [facility] in 4-6 weeks - met  - Increase strength to 5/5 in all affected areas in 4-6 weeks - met    Functional Goals  - Increase Functional Status Measure to: 71 (score 54 at eval) - met  - Patient will be independent with comprehensive HEP in 6-8 weeks - met  - Patient will be able to reach for object from shelf at shoulder height with min reports of difficulty in 2-4 weeks - met  - Patient will be able to reach for object overhead with min to difficulty in 6-8 weeks - partially met, repetitive OH remains symptomatic mildly  - Patient will be able to squeeze objects without provocation of symptoms in 6-8 weeks - met      Plan: D/C to HEP per pt request.     Precautions: PMH CA      Manuals 8/29 /23 9/5 9/14 9/19         [facility] mobs post, dist, caudal  G3 G3 G3         Man str R F,A,IR  20"x5 x5 x5                                   Neuro Re-Ed             Scap retract 10"x15                                                                                          Ther Ex             Table slides F,scapt F 10"x15 10"x 20 ea x30 ea Scapt only x30         UBE for ROM  5' 7' 10'         Core row  blk 2x10 3x10 3x10         Sleeper IR str  20"x5 x5 x5         S/L ER  2x10 1# 3x10 3x10 2#         Scapt w ER  2x10 3x10 1# 3x10                                   Ther Activity             Wall slide F,A   F 10"x 15 x20                                                             Modalities             Ice

## 2023-09-25 ENCOUNTER — TELEPHONE (OUTPATIENT)
Dept: GASTROENTEROLOGY | Facility: CLINIC | Age: 63
End: 2023-09-25

## 2023-09-25 NOTE — TELEPHONE ENCOUNTER
I left a voice message to give our office a call back to review results, I stated results can be viewed in [facility], and  a recall to repeat colonoscopy in 7 years was also entered, Thank you

## 2023-09-26 ENCOUNTER — APPOINTMENT (OUTPATIENT)
Dept: PHYSICAL THERAPY | Facility: CLINIC | Age: 63
End: 2023-09-26
Payer: COMMERCIAL

## 2023-10-27 DIAGNOSIS — I10 ESSENTIAL HYPERTENSION: ICD-10-CM

## 2023-10-27 RX ORDER — LOSARTAN POTASSIUM 100 MG/1
100 TABLET ORAL EVERY MORNING
Qty: 90 TABLET | Refills: 3 | Status: SHIPPED | OUTPATIENT
Start: 2023-10-27

## 2023-12-18 ENCOUNTER — TELEPHONE (OUTPATIENT)
Dept: HEMATOLOGY ONCOLOGY | Facility: CLINIC | Age: 63
End: 2023-12-18

## 2023-12-18 NOTE — TELEPHONE ENCOUNTER
I called Norma regarding an appointment that they have scheduled with Dr. Reynoso scheduled on  1/26/24      I left a voicemail explaining to patient that this appointment will need to be rescheduled due to a change in the providers schedule. Patient was advised to call [facility] to reschedule.  Another attempt will be made to reschedule

## 2023-12-19 ENCOUNTER — TELEPHONE (OUTPATIENT)
Dept: HEMATOLOGY ONCOLOGY | Facility: CLINIC | Age: 63
End: 2023-12-19

## 2023-12-19 NOTE — TELEPHONE ENCOUNTER
I called Norma regarding an appointment that they have scheduled with Dr. Reynoso scheduled on  1/26/24        Appointment Change  Cancel, Reschedule, Change to Virtual      Who are you speaking with? Patient   If it is not the patient, is the caller listed on the communication consent form? N/A   Which provider is the appointment scheduled with? Dr. Reynoso   When was the original appointment scheduled?    Please list date and time 1/26/24 8:00am   At which location is the appointment scheduled to take place? [place]   Was the appointment rescheduled?     Was the appointment changed from an in person visit to a virtual visit?    If so, please list the details of the change. 2/23/24 1:20pm   What is the reason for the appointment change? Change in provider's schedule       Was STAR transport scheduled? No   Does STAR transport need to be scheduled for the new visit (if applicable) No   Does the patient need an infusion appointment rescheduled? No   Does the patient have an upcoming infusion appointment scheduled? If so, when? No   Is the patient undergoing chemotherapy? No   For appointments cancelled with less than 24 hours:  Was the no-show policy reviewed? Yes

## 2023-12-25 DIAGNOSIS — Z17.0 MALIGNANT NEOPLASM OF LOWER-OUTER QUADRANT OF RIGHT BREAST OF FEMALE, ESTROGEN RECEPTOR POSITIVE: ICD-10-CM

## 2023-12-25 DIAGNOSIS — C50.511 MALIGNANT NEOPLASM OF LOWER-OUTER QUADRANT OF RIGHT BREAST OF FEMALE, ESTROGEN RECEPTOR POSITIVE: ICD-10-CM

## 2024-01-11 RX ORDER — ANASTROZOLE 1 MG/1
TABLET ORAL
Qty: 90 TABLET | Refills: 3 | Status: SHIPPED | OUTPATIENT
Start: 2024-01-11

## 2024-01-12 ENCOUNTER — APPOINTMENT (OUTPATIENT)
Dept: RADIOLOGY | Facility: MEDICAL CENTER | Age: 64
End: 2024-01-12
Payer: COMMERCIAL

## 2024-01-12 ENCOUNTER — OFFICE VISIT (OUTPATIENT)
Dept: FAMILY MEDICINE CLINIC | Facility: CLINIC | Age: 64
End: 2024-01-12
Payer: COMMERCIAL

## 2024-01-12 VITALS
OXYGEN SATURATION: 98 % | TEMPERATURE: 98 F | BODY MASS INDEX: 35.26 KG/M2 | DIASTOLIC BLOOD PRESSURE: 80 MMHG | WEIGHT: 199 LBS | HEIGHT: 63 IN | HEART RATE: 62 BPM | SYSTOLIC BLOOD PRESSURE: 134 MMHG

## 2024-01-12 DIAGNOSIS — M25.551 RIGHT HIP PAIN: ICD-10-CM

## 2024-01-12 DIAGNOSIS — Z12.4 SCREENING FOR CERVICAL CANCER: ICD-10-CM

## 2024-01-12 DIAGNOSIS — I10 ESSENTIAL HYPERTENSION: ICD-10-CM

## 2024-01-12 DIAGNOSIS — C50.511 MALIGNANT NEOPLASM OF LOWER-OUTER QUADRANT OF RIGHT BREAST OF FEMALE, ESTROGEN RECEPTOR POSITIVE: ICD-10-CM

## 2024-01-12 DIAGNOSIS — Z17.0 MALIGNANT NEOPLASM OF LOWER-OUTER QUADRANT OF RIGHT BREAST OF FEMALE, ESTROGEN RECEPTOR POSITIVE: ICD-10-CM

## 2024-01-12 DIAGNOSIS — M25.551 RIGHT HIP PAIN: Primary | ICD-10-CM

## 2024-01-12 PROCEDURE — 73502 X-RAY EXAM HIP UNI 2-3 VIEWS: CPT

## 2024-01-12 PROCEDURE — 72110 X-RAY EXAM L-2 SPINE 4/>VWS: CPT

## 2024-01-12 PROCEDURE — 99214 OFFICE O/P EST MOD 30 MIN: CPT | Performed by: FAMILY MEDICINE

## 2024-01-12 RX ORDER — MELOXICAM 15 MG/1
15 TABLET ORAL DAILY
Qty: 30 TABLET | Refills: 0 | Status: SHIPPED | OUTPATIENT
Start: 2024-01-12

## 2024-01-12 RX ORDER — LOSARTAN POTASSIUM 100 MG/1
100 TABLET ORAL EVERY MORNING
Qty: 90 TABLET | Refills: 3 | Status: SHIPPED | OUTPATIENT
Start: 2024-01-12

## 2024-01-12 NOTE — PROGRESS NOTES
"Assessment/Plan: Considering anterior hip pain recommend imaging.  She does have history of breast cancer in the past.  Continue follow-up with oncology.  Consider follow-up with orthopedics.  Recommend physical therapy and meloxicam.     1. Right hip pain  -     XR hip/pelv 2-3 vws right if performed; Future; Expected date: 01/12/2024  -     meloxicam (MOBIC) 15 mg tablet; Take 1 tablet (15 mg total) by mouth daily  -     Ambulatory Referral to Orthopedic Surgery; Future  -     Ambulatory Referral to Physical Therapy; Future  -     XR spine lumbar minimum 4 views non injury; Future; Expected date: 01/12/2024    2. Screening for cervical cancer  -     Ambulatory Referral to Gynecology; Future    3. Malignant neoplasm of lower-outer quadrant of right breast of female, estrogen receptor positive     4. Essential hypertension  -     losartan (COZAAR) 100 MG tablet; Take 1 tablet (100 mg total) by mouth every morning          Subjective:      Patient ID: Norma Mercedes is a 63 y.o. female.    HPI         The following portions of the patient's history were reviewed and updated as appropriate: allergies, current medications, past family history, past medical history, past social history, past surgical history, and problem list.    Review of Systems   Constitutional: Negative.    HENT: Negative.     Eyes: Negative.    Respiratory: Negative.     Cardiovascular: Negative.    Gastrointestinal: Negative.    Endocrine: Negative.    Genitourinary: Negative.    Musculoskeletal:  Positive for arthralgias.   Skin: Negative.    Allergic/Immunologic: Negative.    Neurological: Negative.    Hematological: Negative.    Psychiatric/Behavioral: Negative.           Objective:      /80 (BP Location: Left arm, Patient Position: Sitting, Cuff Size: Standard)   Pulse 62   Temp 98 °F (36.7 °C) (Tympanic)   Ht 5' 3\" (1.6 m)   Wt 90.3 kg (199 lb)   LMP  (LMP Unknown)   SpO2 98%   BMI 35.25 kg/m²          Physical Exam  Vitals " reviewed.   Constitutional:       Appearance: She is well-developed.   HENT:      Head: Normocephalic and atraumatic.      Right Ear: External ear normal. Tympanic membrane is not erythematous or bulging.      Left Ear: External ear normal. Tympanic membrane is not erythematous or bulging.      Nose: Nose normal.      Mouth/Throat:      Mouth: No oral lesions.      Pharynx: No oropharyngeal exudate.   Eyes:      General: No scleral icterus.        Right eye: No discharge.         Left eye: No discharge.      Conjunctiva/sclera: Conjunctivae normal.   Neck:      Thyroid: No thyromegaly.   Cardiovascular:      Rate and Rhythm: Normal rate and regular rhythm.      Heart sounds: Normal heart sounds. No murmur heard.     No friction rub. No gallop.   Pulmonary:      Effort: Pulmonary effort is normal. No respiratory distress.      Breath sounds: No wheezing or rales.   Chest:      Chest wall: No tenderness.   Abdominal:      General: Bowel sounds are normal. There is no distension.      Palpations: Abdomen is soft. There is no mass.      Tenderness: There is no abdominal tenderness. There is no guarding or rebound.   Musculoskeletal:         General: Tenderness (Patient with tenderness to the anterior aspect of the right hip that worsens with small flexion of the right hip and external rotation.) present. No deformity. Normal range of motion.      Cervical back: Normal range of motion and neck supple.   Lymphadenopathy:      Cervical: No cervical adenopathy.   Skin:     General: Skin is warm and dry.      Coloration: Skin is not pale.      Findings: No erythema or rash.   Neurological:      Mental Status: She is alert and oriented to person, place, and time.      Cranial Nerves: No cranial nerve deficit.      Motor: No abnormal muscle tone.      Coordination: Coordination normal.      Deep Tendon Reflexes: Reflexes are normal and symmetric.   Psychiatric:         Behavior: Behavior normal.

## 2024-01-16 ENCOUNTER — OFFICE VISIT (OUTPATIENT)
Dept: OBGYN CLINIC | Facility: CLINIC | Age: 64
End: 2024-01-16
Payer: COMMERCIAL

## 2024-01-16 VITALS
HEART RATE: 66 BPM | DIASTOLIC BLOOD PRESSURE: 100 MMHG | SYSTOLIC BLOOD PRESSURE: 158 MMHG | BODY MASS INDEX: 35.22 KG/M2 | WEIGHT: 198.8 LBS | HEIGHT: 63 IN | TEMPERATURE: 98.9 F

## 2024-01-16 DIAGNOSIS — G89.29 CHRONIC RIGHT-SIDED LOW BACK PAIN WITHOUT SCIATICA: ICD-10-CM

## 2024-01-16 DIAGNOSIS — M54.16 LUMBAR RADICULOPATHY: ICD-10-CM

## 2024-01-16 DIAGNOSIS — M25.551 RIGHT HIP PAIN: ICD-10-CM

## 2024-01-16 DIAGNOSIS — M54.50 CHRONIC RIGHT-SIDED LOW BACK PAIN WITHOUT SCIATICA: ICD-10-CM

## 2024-01-16 DIAGNOSIS — M16.11 PRIMARY OSTEOARTHRITIS OF ONE HIP, RIGHT: Primary | ICD-10-CM

## 2024-01-16 PROCEDURE — 99214 OFFICE O/P EST MOD 30 MIN: CPT | Performed by: FAMILY MEDICINE

## 2024-01-16 PROCEDURE — 20611 DRAIN/INJ JOINT/BURSA W/US: CPT | Performed by: FAMILY MEDICINE

## 2024-01-16 RX ORDER — ROPIVACAINE HYDROCHLORIDE 5 MG/ML
10 INJECTION, SOLUTION EPIDURAL; INFILTRATION; PERINEURAL
Status: COMPLETED | OUTPATIENT
Start: 2024-01-16 | End: 2024-01-16

## 2024-01-16 RX ORDER — TRIAMCINOLONE ACETONIDE 40 MG/ML
40 INJECTION, SUSPENSION INTRA-ARTICULAR; INTRAMUSCULAR
Status: COMPLETED | OUTPATIENT
Start: 2024-01-16 | End: 2024-01-16

## 2024-01-16 RX ADMIN — ROPIVACAINE HYDROCHLORIDE 10 ML: 5 INJECTION, SOLUTION EPIDURAL; INFILTRATION; PERINEURAL at 10:00

## 2024-01-16 RX ADMIN — TRIAMCINOLONE ACETONIDE 40 MG: 40 INJECTION, SUSPENSION INTRA-ARTICULAR; INTRAMUSCULAR at 10:00

## 2024-01-16 NOTE — PROGRESS NOTES
[facility] [place] [address]  [place] [address] [phone]      Assessment:  1. Primary osteoarthritis of one hip, right  -     Ambulatory Referral to Physical Therapy; Future  -     Ambulatory Referral to Physical Therapy; Future    2. Right hip pain  -     Ambulatory Referral to Orthopedic Surgery  -     Ambulatory Referral to Physical Therapy; Future  -     Ambulatory Referral to Physical Therapy; Future    3. Chronic right-sided low back pain without sciatica  -     Ambulatory Referral to Physical Therapy; Future  -     Ambulatory Referral to Physical Therapy; Future    4. Lumbar radiculopathy  -     Ambulatory Referral to Physical Therapy; Future  -     Ambulatory Referral to Physical Therapy; Future        Plan:  Patient Instructions   F/u 6 wks  Begin physical therapy  Home exercises.  Icing/heat/OTC pain meds as needed.     Return in 6 weeks (on 2/27/2024) for Recheck.    Chief Complaint:  Chief Complaint   Patient presents with    Right Hip - Pain       Subjective:   HPI    Patient ID: Norma Mercedes is a 63 y.o. female     Here c/o R hip pain  Pain for about 2 wks  Denies injury/trauma  Constant pain but better at rest.  Worse lifting leg/pain walking- gets caught- zings  Sharp pain at time  Meloxicam- helps her have less pain/sleep  Also has chronic lower back pain  Radiates to groin- has been to PT  Doing home exercises- not helping  Hurts to bend      Narrative & Impression         RIGHT HIP     INDICATION:   Pain in right hip.      COMPARISON:  Comparison made with previous examination(s) dated (DX) 24-Aug-2023,(MR) 23-Feb-2023,(CT) 24-Dec-2022,(NM) 23-Jun-2022.     VIEWS:  XR HIP/PELV 2-3 VWS RIGHT W PELVIS IF PERFORMED      FINDINGS:     There is no acute fracture or dislocation.     No significant hip degenerative narrowing of the right hip joint space. There is a small subchondral cyst the superior acetabular margin however.     No lytic  "or blastic osseous lesion.     Soft tissues are unremarkable.     Degenerative changes visualized lower lumbar spine. Mild scoliosis     .     IMPRESSION:        No fracture or significant degenerative joint space narrowing of the right hip. Small degenerative subchondral cyst at the superior acetabular margin. Mild scoliosis and degenerative changes moderate degree of the lower lumbosacral spine.     Electronically signed: 01/12/2024 12:44 PM Emanuel Mercedes MD       Review of Systems   Constitutional:  Negative for fatigue and fever.   Respiratory:  Negative for shortness of breath.    Cardiovascular:  Negative for chest pain.   Gastrointestinal:  Negative for abdominal pain and nausea.   Genitourinary:  Negative for dysuria.   Musculoskeletal:  Positive for arthralgias.   Skin:  Negative for rash and wound.   Neurological:  Negative for weakness and headaches.       Objective:  Vitals:  /100 (BP Location: Left arm, Patient Position: Sitting, Cuff Size: Standard)   Pulse 66   Temp 98.9 °F (37.2 °C) (Tympanic)   Ht 5' 3\" (1.6 m)   Wt 90.2 kg (198 lb 12.8 oz)   LMP  (LMP Unknown)   BMI 35.22 kg/m²     The following portions of the patient's history were reviewed and updated as appropriate: allergies, current medications, past family history, past medical history, past social history, past surgical history, and problem list.    Physical exam:  Physical Exam  Constitutional:       Appearance: Normal appearance. She is normal weight.   HENT:      Head: Normocephalic.   Eyes:      Extraocular Movements: Extraocular movements intact.   Pulmonary:      Effort: Pulmonary effort is normal.   Musculoskeletal:         General: Tenderness present.      Cervical back: Normal range of motion.      Lumbar back: Positive right straight leg raise test. Negative left straight leg raise test.   Skin:     General: Skin is warm and dry.   Neurological:      General: No focal deficit present.      Mental Status: She is alert " "and oriented to person, place, and time. Mental status is at baseline.   Psychiatric:         Mood and Affect: Mood normal.         Behavior: Behavior normal.         Thought Content: Thought content normal.         Judgment: Judgment normal.       Right Hip Exam     Tenderness   The patient is experiencing tenderness in the greater trochanter.    Range of Motion   The patient has normal right hip ROM.    Muscle Strength   Abduction: 5/5   Adduction: 5/5   Flexion: 4/5     Tests   VERONICA: positive    Comments:  Pain with ROM      Back Exam     Tenderness   The patient is experiencing tenderness in the lumbar.    Range of Motion   The patient has normal back ROM.    Muscle Strength   Right Quadriceps:  4/5   Left Quadriceps:  5/5   Right Hamstrings:  5/5   Left Hamstrings:  5/5     Tests   Straight leg raise right: positive  Straight leg raise left: negative    Reflexes   Patellar:  normal    Comments:  Pain with flex/ext/rot B/        Large joint arthrocentesis: R hip joint  Universal Protocol:  Consent: Verbal consent obtained.  Risks and benefits: risks, benefits and alternatives were discussed  Consent given by: patient  Time out: Immediately prior to procedure a \"time out\" was called to verify the correct patient, procedure, equipment, support staff and site/side marked as required.  Timeout called at: 1/16/2024 10:16 AM.  Supporting Documentation  Indications: pain   Procedure Details  Location: hip - R hip joint  Needle size: 22 G  Ultrasound guidance: yes  Approach: anterior  Medications administered: 10 mL ropivacaine 0.5 %; 40 mg triamcinolone acetonide 40 mg/mL    Patient tolerance: patient tolerated the procedure well with no immediate complications  Dressing:  Sterile dressing applied            I have personally reviewed pertinent films in PACS and my interpretation is XR-  R hip- mild OA, .L spine-  multiple levels of DJDD, anterolistheis L2/3, L5/S1      Semaj Velazquez MD   "

## 2024-01-16 NOTE — LETTER
January 16, 2024     Patient: Norma Mercedes  YOB: 1960  Date of Visit: 1/16/2024      To Whom it May Concern:    Norma Mercedes is under my professional care. Norma was seen in my office on 1/16/2024. Norma may return to work on 1/17/24.      If you have any questions or concerns, please don't hesitate to call.         Sincerely,          Semaj Velazquez MD        CC: No Recipients

## 2024-01-16 NOTE — LETTER
January 16, 2024     Kayode Bland DO  [address]  [facility] [address]    Patient: Norma Mercedes   YOB: 1960   Date of Visit: 1/16/2024       Dear Dr. Bland:    Thank you for referring Norma Mercedes to me for evaluation. Below are my notes for this consultation.    If you have questions, please do not hesitate to call me. I look forward to following your patient along with you.         Sincerely,        Semaj Velazquez MD        CC: No Recipients    Semaj Velazquez MD  1/16/2024 10:16 AM  Incomplete  [facility] [address] [id number] [phone]      Assessment:  1. Primary osteoarthritis of one hip, right  -     Ambulatory Referral to Physical Therapy; Future  -     Ambulatory Referral to Physical Therapy; Future    2. Right hip pain  -     Ambulatory Referral to Orthopedic Surgery  -     Ambulatory Referral to Physical Therapy; Future  -     Ambulatory Referral to Physical Therapy; Future    3. Chronic right-sided low back pain without sciatica  -     Ambulatory Referral to Physical Therapy; Future  -     Ambulatory Referral to Physical Therapy; Future    4. Lumbar radiculopathy  -     Ambulatory Referral to Physical Therapy; Future  -     Ambulatory Referral to Physical Therapy; Future        Plan:  Patient Instructions   F/u for US guided R hip steroid injection  Begin physical therapy  Home exercises.  Icing/heat/OTC pain meds as needed.     Return for f/u for US guided R hip joint steroid injection, Recheck.    Chief Complaint:  Chief Complaint   Patient presents with   • Right Hip - Pain       Subjective:   HPI    Patient ID: Norma Mercedes is a 63 y.o. female     Here c/o R hip pain  Pain for about 2 wks  Denies injury/trauma  Constant pain but better at rest.  Worse lifting leg/pain walking- gets caught- zings  Sharp pain at time  Meloxicam- helps her have less pain/sleep  Also has chronic lower back  "pain  Radiates to groin- has been to PT  Doing home exercises- not helping  Hurts to bend      Narrative & Impression         RIGHT HIP     INDICATION:   Pain in right hip.      COMPARISON:  Comparison made with previous examination(s) dated (DX) 24-Aug-2023,(MR) 23-Feb-2023,(CT) 24-Dec-2022,(NM) 23-Jun-2022.     VIEWS:  XR HIP/PELV 2-3 VWS RIGHT W PELVIS IF PERFORMED      FINDINGS:     There is no acute fracture or dislocation.     No significant hip degenerative narrowing of the right hip joint space. There is a small subchondral cyst the superior acetabular margin however.     No lytic or blastic osseous lesion.     Soft tissues are unremarkable.     Degenerative changes visualized lower lumbar spine. Mild scoliosis     .     IMPRESSION:        No fracture or significant degenerative joint space narrowing of the right hip. Small degenerative subchondral cyst at the superior acetabular margin. Mild scoliosis and degenerative changes moderate degree of the lower lumbosacral spine.     Electronically signed: 01/12/2024 12:44 PM Emanuel Mercedes MD       Review of Systems   Constitutional:  Negative for fatigue and fever.   Respiratory:  Negative for shortness of breath.    Cardiovascular:  Negative for chest pain.   Gastrointestinal:  Negative for abdominal pain and nausea.   Genitourinary:  Negative for dysuria.   Musculoskeletal:  Positive for arthralgias.   Skin:  Negative for rash and wound.   Neurological:  Negative for weakness and headaches.       Objective:  Vitals:  /100 (BP Location: Left arm, Patient Position: Sitting, Cuff Size: Standard)   Pulse 66   Temp 98.9 °F (37.2 °C) (Tympanic)   Ht 5' 3\" (1.6 m)   Wt 90.2 kg (198 lb 12.8 oz)   LMP  (LMP Unknown)   BMI 35.22 kg/m²     The following portions of the patient's history were reviewed and updated as appropriate: allergies, current medications, past family history, past medical history, past social history, past surgical history, and problem " "list.    Physical exam:  Physical Exam  Constitutional:       Appearance: Normal appearance. She is normal weight.   HENT:      Head: Normocephalic.   Eyes:      Extraocular Movements: Extraocular movements intact.   Pulmonary:      Effort: Pulmonary effort is normal.   Musculoskeletal:         General: Tenderness present.      Cervical back: Normal range of motion.      Lumbar back: Positive right straight leg raise test. Negative left straight leg raise test.   Skin:     General: Skin is warm and dry.   Neurological:      General: No focal deficit present.      Mental Status: She is alert and oriented to person, place, and time. Mental status is at baseline.   Psychiatric:         Mood and Affect: Mood normal.         Behavior: Behavior normal.         Thought Content: Thought content normal.         Judgment: Judgment normal.       Right Hip Exam     Tenderness   The patient is experiencing tenderness in the greater trochanter.    Range of Motion   The patient has normal right hip ROM.    Muscle Strength   Abduction: 5/5   Adduction: 5/5   Flexion: 4/5     Tests   VERONICA: positive    Comments:  Pain with ROM      Back Exam     Tenderness   The patient is experiencing tenderness in the lumbar.    Range of Motion   The patient has normal back ROM.    Muscle Strength   Right Quadriceps:  4/5   Left Quadriceps:  5/5   Right Hamstrings:  5/5   Left Hamstrings:  5/5     Tests   Straight leg raise right: positive  Straight leg raise left: negative    Reflexes   Patellar:  normal    Comments:  Pain with flex/ext/rot B/        Large joint arthrocentesis: R hip joint  Universal Protocol:  Consent: Verbal consent obtained.  Risks and benefits: risks, benefits and alternatives were discussed  Consent given by: patient  Time out: Immediately prior to procedure a \"time out\" was called to verify the correct patient, procedure, equipment, support staff and site/side marked as required.  Timeout called at: 1/16/2024 10:16 " AM.  Supporting Documentation  Indications: pain   Procedure Details  Location: hip - R hip joint  Needle size: 22 G  Ultrasound guidance: yes  Approach: anterior  Medications administered: 10 mL ropivacaine 0.5 %; 40 mg triamcinolone acetonide 40 mg/mL    Patient tolerance: patient tolerated the procedure well with no immediate complications  Dressing:  Sterile dressing applied            I have personally reviewed pertinent films in PACS and my interpretation is XR-  R hip- mild OA, .L spine-  multiple levels of DJDD, anterolistheis L2/3, L5/S1      MD Semaj Fernandez MD  1/16/2024 10:14 AM  Signed  [facility] [address] [address] [phone]      Assessment:  1. Primary osteoarthritis of one hip, right  -     Ambulatory Referral to Physical Therapy; Future  -     Ambulatory Referral to Physical Therapy; Future    2. Right hip pain  -     Ambulatory Referral to Orthopedic Surgery  -     Ambulatory Referral to Physical Therapy; Future  -     Ambulatory Referral to Physical Therapy; Future    3. Chronic right-sided low back pain without sciatica  -     Ambulatory Referral to Physical Therapy; Future  -     Ambulatory Referral to Physical Therapy; Future    4. Lumbar radiculopathy  -     Ambulatory Referral to Physical Therapy; Future  -     Ambulatory Referral to Physical Therapy; Future        Plan:  Patient Instructions   F/u for US guided R hip steroid injection  Begin physical therapy  Home exercises.  Icing/heat/OTC pain meds as needed.     Return for f/u for US guided R hip joint steroid injection, Recheck.    Chief Complaint:  Chief Complaint   Patient presents with   • Right Hip - Pain       Subjective:   HPI    Patient ID: Norma Mercedes is a 63 y.o. female     Here c/o R hip pain  Pain for about 2 wks  Denies injury/trauma  Constant pain but better at rest.  Worse lifting leg/pain walking- gets caught-  "zings  Sharp pain at time  Meloxicam- helps her have less pain/sleep  Also has chronic lower back pain  Radiates to groin- has been to PT  Doing home exercises- not helping  Hurts to bend      Narrative & Impression         RIGHT HIP     INDICATION:   Pain in right hip.      COMPARISON:  Comparison made with previous examination(s) dated (DX) 24-Aug-2023,(MR) 23-Feb-2023,(CT) 24-Dec-2022,(NM) 23-Jun-2022.     VIEWS:  XR HIP/PELV 2-3 VWS RIGHT W PELVIS IF PERFORMED      FINDINGS:     There is no acute fracture or dislocation.     No significant hip degenerative narrowing of the right hip joint space. There is a small subchondral cyst the superior acetabular margin however.     No lytic or blastic osseous lesion.     Soft tissues are unremarkable.     Degenerative changes visualized lower lumbar spine. Mild scoliosis     .     IMPRESSION:        No fracture or significant degenerative joint space narrowing of the right hip. Small degenerative subchondral cyst at the superior acetabular margin. Mild scoliosis and degenerative changes moderate degree of the lower lumbosacral spine.     Electronically signed: 01/12/2024 12:44 PM Emanuel Mercedes MD       Review of Systems   Constitutional:  Negative for fatigue and fever.   Respiratory:  Negative for shortness of breath.    Cardiovascular:  Negative for chest pain.   Gastrointestinal:  Negative for abdominal pain and nausea.   Genitourinary:  Negative for dysuria.   Musculoskeletal:  Positive for arthralgias.   Skin:  Negative for rash and wound.   Neurological:  Negative for weakness and headaches.       Objective:  Vitals:  /100 (BP Location: Left arm, Patient Position: Sitting, Cuff Size: Standard)   Pulse 66   Temp 98.9 °F (37.2 °C) (Tympanic)   Ht 5' 3\" (1.6 m)   Wt 90.2 kg (198 lb 12.8 oz)   LMP  (LMP Unknown)   BMI 35.22 kg/m²     The following portions of the patient's history were reviewed and updated as appropriate: allergies, current medications, " past family history, past medical history, past social history, past surgical history, and problem list.    Physical exam:  Physical Exam  Constitutional:       Appearance: Normal appearance. She is normal weight.   HENT:      Head: Normocephalic.   Eyes:      Extraocular Movements: Extraocular movements intact.   Pulmonary:      Effort: Pulmonary effort is normal.   Musculoskeletal:         General: Tenderness present.      Cervical back: Normal range of motion.      Lumbar back: Positive right straight leg raise test. Negative left straight leg raise test.   Skin:     General: Skin is warm and dry.   Neurological:      General: No focal deficit present.      Mental Status: She is alert and oriented to person, place, and time. Mental status is at baseline.   Psychiatric:         Mood and Affect: Mood normal.         Behavior: Behavior normal.         Thought Content: Thought content normal.         Judgment: Judgment normal.       Right Hip Exam     Tenderness   The patient is experiencing tenderness in the greater trochanter.    Range of Motion   The patient has normal right hip ROM.    Muscle Strength   Abduction: 5/5   Adduction: 5/5   Flexion: 4/5     Tests   VERONICA: positive    Comments:  Pain with ROM      Back Exam     Tenderness   The patient is experiencing tenderness in the lumbar.    Range of Motion   The patient has normal back ROM.    Muscle Strength   Right Quadriceps:  4/5   Left Quadriceps:  5/5   Right Hamstrings:  5/5   Left Hamstrings:  5/5     Tests   Straight leg raise right: positive  Straight leg raise left: negative    Reflexes   Patellar:  normal    Comments:  Pain with flex/ext/rot B/            I have personally reviewed pertinent films in PACS and my interpretation is XR-  R hip- mild OA, .L spine-  multiple levels of DJDD, anterolistheis L2/3, L5/S1      Semaj Velazquez MD

## 2024-01-16 NOTE — LETTER
January 16, 2024     Kayode Bland DO  [address]  [facility] [address]    Patient: Norma Mercedes   YOB: 1960   Date of Visit: 1/16/2024       Dear Dr. Bland:    Thank you for referring Norma Mercedes to me for evaluation. Below are my notes for this consultation.    If you have questions, please do not hesitate to call me. I look forward to following your patient along with you.         Sincerely,        Semaj Velazquez MD        CC: No Recipients    Semaj eVlazquez MD  1/16/2024 10:12 AM  Incomplete  [facility] [address] [id number] [phone]      Assessment:  1. Primary osteoarthritis of one hip, right  -     Ambulatory Referral to Physical Therapy; Future    2. Right hip pain  -     Ambulatory Referral to Orthopedic Surgery  -     Ambulatory Referral to Physical Therapy; Future    3. Chronic right-sided low back pain without sciatica  -     Ambulatory Referral to Physical Therapy; Future    4. Lumbar radiculopathy  -     Ambulatory Referral to Physical Therapy; Future        Plan:  There are no Patient Instructions on file for this visit.   No follow-ups on file.    Chief Complaint:  Chief Complaint   Patient presents with   • Right Hip - Pain       Subjective:   HPI    Patient ID: Norma Mercedes is a 63 y.o. female     Here c/o R hip pain  Pain for about 2 wks  Denies injury/trauma  Constant pain but better at rest.  Worse lifting leg/pain walking- gets caught- zings  Sharp pain at time  Meloxicam- helps her have less pain/sleep  Also has chronic lower back pain  Radiates to groin- has been to PT  Doing home exercises- not helping  Hurts to bend      Narrative & Impression         RIGHT HIP     INDICATION:   Pain in right hip.      COMPARISON:  Comparison made with previous examination(s) dated (DX) 24-Aug-2023,(MR) 23-Feb-2023,(CT) 24-Dec-2022,(NM) 23-Jun-2022.     VIEWS:  XR HIP/PELV 2-3 VWS RIGHT W PELVIS IF  "PERFORMED      FINDINGS:     There is no acute fracture or dislocation.     No significant hip degenerative narrowing of the right hip joint space. There is a small subchondral cyst the superior acetabular margin however.     No lytic or blastic osseous lesion.     Soft tissues are unremarkable.     Degenerative changes visualized lower lumbar spine. Mild scoliosis     .     IMPRESSION:        No fracture or significant degenerative joint space narrowing of the right hip. Small degenerative subchondral cyst at the superior acetabular margin. Mild scoliosis and degenerative changes moderate degree of the lower lumbosacral spine.     Electronically signed: 01/12/2024 12:44 PM Emanuel Mercedes MD       Review of Systems   Constitutional:  Negative for fatigue and fever.   Respiratory:  Negative for shortness of breath.    Cardiovascular:  Negative for chest pain.   Gastrointestinal:  Negative for abdominal pain and nausea.   Genitourinary:  Negative for dysuria.   Musculoskeletal:  Positive for arthralgias.   Skin:  Negative for rash and wound.   Neurological:  Negative for weakness and headaches.       Objective:  Vitals:  /100 (BP Location: Left arm, Patient Position: Sitting, Cuff Size: Standard)   Pulse 66   Temp 98.9 °F (37.2 °C) (Tympanic)   Ht 5' 3\" (1.6 m)   Wt 90.2 kg (198 lb 12.8 oz)   LMP  (LMP Unknown)   BMI 35.22 kg/m²     The following portions of the patient's history were reviewed and updated as appropriate: allergies, current medications, past family history, past medical history, past social history, past surgical history, and problem list.    Physical exam:  Physical Exam  Constitutional:       Appearance: Normal appearance. She is normal weight.   HENT:      Head: Normocephalic.   Eyes:      Extraocular Movements: Extraocular movements intact.   Pulmonary:      Effort: Pulmonary effort is normal.   Musculoskeletal:         General: Tenderness present.      Cervical back: Normal range of " motion.      Lumbar back: Positive right straight leg raise test. Negative left straight leg raise test.   Skin:     General: Skin is warm and dry.   Neurological:      General: No focal deficit present.      Mental Status: She is alert and oriented to person, place, and time. Mental status is at baseline.   Psychiatric:         Mood and Affect: Mood normal.         Behavior: Behavior normal.         Thought Content: Thought content normal.         Judgment: Judgment normal.       Right Hip Exam     Tenderness   The patient is experiencing tenderness in the greater trochanter.    Range of Motion   The patient has normal right hip ROM.    Muscle Strength   Abduction: 5/5   Adduction: 5/5   Flexion: 4/5     Tests   VERONICA: positive    Comments:  Pain with ROM      Back Exam     Tenderness   The patient is experiencing tenderness in the lumbar.    Range of Motion   The patient has normal back ROM.    Muscle Strength   Right Quadriceps:  4/5   Left Quadriceps:  5/5   Right Hamstrings:  5/5   Left Hamstrings:  5/5     Tests   Straight leg raise right: positive  Straight leg raise left: negative    Reflexes   Patellar:  normal    Comments:  Pain with flex/ext/rot B/            I have personally reviewed pertinent films in PACS and my interpretation is XR-  R hip- mild OA, .L spine-  multiple levels of DJDD, anterolistheis L2/3, L5/S1      Semaj Velazquez MD

## 2024-01-24 ENCOUNTER — EVALUATION (OUTPATIENT)
Dept: PHYSICAL THERAPY | Facility: CLINIC | Age: 64
End: 2024-01-24
Payer: COMMERCIAL

## 2024-01-24 DIAGNOSIS — M54.16 LUMBAR RADICULOPATHY: ICD-10-CM

## 2024-01-24 DIAGNOSIS — M25.551 RIGHT HIP PAIN: Primary | ICD-10-CM

## 2024-01-24 DIAGNOSIS — M54.50 CHRONIC RIGHT-SIDED LOW BACK PAIN WITHOUT SCIATICA: ICD-10-CM

## 2024-01-24 DIAGNOSIS — M16.11 PRIMARY OSTEOARTHRITIS OF ONE HIP, RIGHT: ICD-10-CM

## 2024-01-24 DIAGNOSIS — G89.29 CHRONIC RIGHT-SIDED LOW BACK PAIN WITHOUT SCIATICA: ICD-10-CM

## 2024-01-24 PROCEDURE — 97110 THERAPEUTIC EXERCISES: CPT | Performed by: PHYSICAL THERAPIST

## 2024-01-24 PROCEDURE — 97162 PT EVAL MOD COMPLEX 30 MIN: CPT | Performed by: PHYSICAL THERAPIST

## 2024-01-24 PROCEDURE — 97140 MANUAL THERAPY 1/> REGIONS: CPT | Performed by: PHYSICAL THERAPIST
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
## 2024-01-31 ENCOUNTER — OFFICE VISIT (OUTPATIENT)
Dept: PHYSICAL THERAPY | Facility: CLINIC | Age: 64
End: 2024-01-31
Payer: COMMERCIAL

## 2024-01-31 DIAGNOSIS — M54.50 CHRONIC RIGHT-SIDED LOW BACK PAIN WITHOUT SCIATICA: ICD-10-CM

## 2024-01-31 DIAGNOSIS — G89.29 CHRONIC RIGHT-SIDED LOW BACK PAIN WITHOUT SCIATICA: ICD-10-CM

## 2024-01-31 DIAGNOSIS — M16.11 PRIMARY OSTEOARTHRITIS OF ONE HIP, RIGHT: ICD-10-CM

## 2024-01-31 DIAGNOSIS — M25.551 RIGHT HIP PAIN: Primary | ICD-10-CM

## 2024-01-31 DIAGNOSIS — M54.16 LUMBAR RADICULOPATHY: ICD-10-CM

## 2024-01-31 PROCEDURE — 97110 THERAPEUTIC EXERCISES: CPT

## 2024-01-31 PROCEDURE — 97112 NEUROMUSCULAR REEDUCATION: CPT

## 2024-01-31 NOTE — PROGRESS NOTES
Daily Note     Today's date: 2024  Patient name: Norma Mercedes  : 1960  MRN: [id number]  Referring provider: Semaj Velazquez MD  Dx:   Encounter Diagnosis     ICD-10-CM    1. Right hip pain  M25.551       2. Chronic right-sided low back pain without sciatica  M54.50     G89.29       3. Lumbar radiculopathy  M54.16       4. Primary osteoarthritis of one hip, right  M16.11           Start Time: 947          Subjective: Patient states her pain has been changing with good days and bad with twining pain. Presently she has 4/10 right hip/ groin pain. Her exercises are going well at home except for bridges. At times she has a muscle spasm in her buttocks from it.       Objective: See treatment diary below    Patient's home exercise program updated to include additional exercises. Handout issued and explained with demonstration. Patient accepts new exercises. Red theraband issued for home use.    Assessment: Tolerated treatment well. Patient would benefit from continued PT for stretching and strengthening. She was able to add exercises to her program with little difficulty. Left glut seemed weaker then right during prone glut set. She still had a little tight right buttocks spasm with initiation of bridge even with adjustments. Patient seemed to understand all education on new exercises. She felt better and rated her right hip pain a 2-3/10 by the end of the session.       Plan: Continue per plan of care.  Progress treatment as tolerated.       Precautions: CA  Access code: [email or web address]  Progress note:   POC:     Manuals       Thoracic and upper lumbar PA Grade III Muscle energy      PPU with OP *                       Neuro Re-Ed        UBE (for standing stability trunk control) * 100/70 x4 min ALT      Core brace * :05x10      Knee fall out * x10                      Bird dog                        Ther Ex                Standing lumbar extension With thumbs as fulcrum 10x // bars x10      PPU   "x10      Bridge :05x10 :03 x10      Prone hip extension        Standing hip extension knee flexion and straight        Prone glut set  x10                      Supine ball squeeze * :05x10      Supine clamshell *  Pain with SL Supine red :05x10              Side steps  10ft x4      Heel to toe  10ft x4      Hip flexor stretch At step :15x4 6\" :15x4 B/L              Ther Activity        Step ups        Sit to stand        Gait Training                        Modalities        Lumbar mechanical traction                  Access Code: [email or web address]  URL: [email or web address]  Date: 01/31/2024  Prepared by: Genia Murrell    Exercises  - Supine Bridge  - 3 x daily - 7 x weekly - 1 sets - 10 reps - 5 sec hold  - Hip Flexor Stretch on Step  - 3 x daily - 7 x weekly - 1 sets - 4 reps - 15 sec hold  - Standing Lumbar Extension  - 6 x daily - 7 x weekly - 1 sets - 10 reps  - Tandem Walking with Counter Support  - 2 x daily - 7 x weekly - 1 sets - 4 reps  - Side Stepping with Counter Support  - 2 x daily - 7 x weekly - 1 sets - 4 reps  - Hooklying Clamshell with Resistance  - 2 x daily - 7 x weekly - 1 sets - 10 reps - 5 sec hold  - Supine Hip Adduction Isometric with Ball  - 2 x daily - 7 x weekly - 1 sets - 10 reps - 5 sec hold  - Supine Transversus Abdominis Bracing - Hands on Stomach  - 2 x daily - 7 x weekly - 1 sets - 10 reps - 5 sec hold  - Bent Knee Fallouts  - 2 x daily - 7 x weekly - 1 sets - 10 reps  - Prone Gluteal Sets  - 2 x daily - 7 x weekly - 1 sets - 10 reps - 5 sec hold  - Prone Press Up  - 2 x daily - 7 x weekly - 1 sets - 10 reps       "

## 2024-02-02 ENCOUNTER — TELEPHONE (OUTPATIENT)
Dept: HEMATOLOGY ONCOLOGY | Facility: CLINIC | Age: 64
End: 2024-02-02

## 2024-02-02 NOTE — TELEPHONE ENCOUNTER
Appointment Schedule   Who are you speaking with? Patient   If it is not the patient, are they listed on an active communication consent form? N/A   Which provider is the appointment scheduled with? LEA Meehan   At which location is the appointment scheduled for? [place]   When is the appointment scheduled?  Please list date and time 02/06/2024 @10:30AM    What is the reason for this appointment? Survivorship    Did patient voice understanding of the details of this appointment? Yes   Was the no show policy reviewed with patient? Yes

## 2024-02-02 NOTE — TELEPHONE ENCOUNTER
Patient Call    Who are you speaking with? Patient    If it is not the patient, are they listed on an active communication consent form? N/A   What is the reason for this call? Patient calling in regards to her care plan.  Patient was scheduled to see Dr Burton in 9/2023 but the appointment was canceled and patient did not reschedule.  Patient would like to know if she should schedule a follow up to be seen by Dr Burton or one of the Aps. Patient would like a call back to discuss.      Patient was transferred to central scheduling to schedule a mammogram.    Does this require a call back? Yes   If a call back is required, please list best call back number [phone]   If a call back is required, advise that a message will be forwarded to their care team and someone will return their call as soon as possible.   Did you relay this information to the patient? Yes

## 2024-02-06 ENCOUNTER — ONCOLOGY SURVIVORSHIP (OUTPATIENT)
Dept: SURGICAL ONCOLOGY | Facility: CLINIC | Age: 64
End: 2024-02-06
Payer: COMMERCIAL

## 2024-02-06 VITALS
SYSTOLIC BLOOD PRESSURE: 128 MMHG | OXYGEN SATURATION: 99 % | BODY MASS INDEX: 34.76 KG/M2 | HEART RATE: 68 BPM | HEIGHT: 63 IN | DIASTOLIC BLOOD PRESSURE: 86 MMHG | TEMPERATURE: 98.2 F | WEIGHT: 196.2 LBS | RESPIRATION RATE: 16 BRPM

## 2024-02-06 DIAGNOSIS — C50.511 MALIGNANT NEOPLASM OF LOWER-OUTER QUADRANT OF RIGHT BREAST OF FEMALE, ESTROGEN RECEPTOR POSITIVE: Primary | ICD-10-CM

## 2024-02-06 DIAGNOSIS — Z79.811 USE OF ANASTROZOLE: ICD-10-CM

## 2024-02-06 DIAGNOSIS — Z17.0 MALIGNANT NEOPLASM OF LOWER-OUTER QUADRANT OF RIGHT BREAST OF FEMALE, ESTROGEN RECEPTOR POSITIVE: Primary | ICD-10-CM

## 2024-02-06 PROCEDURE — 99213 OFFICE O/P EST LOW 20 MIN: CPT | Performed by: NURSE PRACTITIONER

## 2024-02-06 NOTE — PROGRESS NOTES
Assessment/Plan:    Diagnoses and all orders for this visit:    Malignant neoplasm of lower-outer quadrant of right breast of female, estrogen receptor positive   -     Mammo diagnostic bilateral w 3d & cad; Future    Use of anastrozole  Patient is a 63-year-old female that was diagnosed with right breast cancer in April 2022.  Her pathology revealed invasive ductal carcinoma, %, WA 45%, HER2 negative.  She underwent genetic testing which was negative.  She underwent a right lumpectomy and sentinel lymph biopsy and underwent intraoperative radiation therapy.  She is currently taking anastrozole.   She had a bilateral mammogram and right breast ultrasound in March 2023 which was BI-RADS 2, category 2 density.  She had a colonoscopy in September 2023, 7-year follow-up was recommended.  She offers no new complaints today aside from ongoing arthralgias for which she is undergoing physical therapy.  She had x-rays performed of her hip and back which revealed arthritis.  No concerns on today's clinical exam.  Prescription given for mammogram due next month.  We will see her back in 6 months or sooner should the need arise.  She was instructed to contact us with any changes or concerns in the interim.    REASON FOR VISIT:   Survivorship      Previous therapy:  Oncology History   Benign meningioma (HCC)   2014 Initial Diagnosis    Benign meningioma (HCC)     6/16/2014 Surgery    suboccipital craniotomy and C1 laminectomy- pathology was consistent with meningioma who grade 1     7/6/2016 - 7/25/2016 Radiation    Plan ID Energy Fractions Dose per Fraction (cGy) Total Dose Delivered (cGy) Elapsed Days   SRT R [id number] 6X 3 / 3 450 1,350 5   SRT R Foramen 6X 2 / 2 500 1,000 2           Malignant neoplasm of lower-outer quadrant of right breast of female, estrogen receptor positive    4/8/2022 Biopsy    Right breast biopsy:  - Invasive mammary carcinoma of no special type (ductal).    %, WA 45%, HER2 1+ negative      Right axillary lymph node  - negative for carcinoma       4/19/2022 -  Cancer Staged    Staging form: Breast, AJCC 8th Edition  - Clinical stage from 4/19/2022: Stage IA (cT1c, cN0(f), cM0, G1, ER+, PA+, HER2-) - Signed by Beverley Burton MD on 4/19/2022  Stage prefix: Initial diagnosis  Method of lymph node assessment: Core biopsy  Histologic grading system: 3 grade system       5/2022 Genetic Testing    [facility] BRCAplus STAT Panel (8 genes): ELEANOR, BRCA1, BRCA2, CDH1, CHEK2, PALB2, PTEN, TP53  Test(s): APC, AXIN2 BARD1, BRIP1, BMPR1A, CDK4, CDKN2A, DICER1, EPCAM, GREM1, HOXB13, MLH1, MSH2, MSH3, MSH6, MUTYH, NBN, NF1, NTHL1, PMS2, POLD1, POLE, RAD51C, RAD51D, RECQL SMAD4, SMARCA4, STK11      Result:   Negative - No Clinically Significant Variants Detected       6/23/2022 - 6/23/2022 Radiation    Field Numbers Energy Treatment Site Starting  Ending  Elapsed  Fraction Total  Overlap Site         Date Date Days Dose Dose     IORT  50 kVp Right Breast 6-23-22 6-23-22 0 2000 cGy 2000 cGy       Dr Paulino     6/23/2022 Surgery    Right breast lumpectomy, SLNB (Dr Burton), IORT  - Negative margins  - 0/2 lymph nodes       7/22/2022 -  Hormone Therapy    Anastrozole 1 mg daily    Dr Mcqueen           Patient ID: Norma Mercedes is a 63 y.o. female  Presenting today for a follow-up visit.  She has not appreciated any changes on self-exam.  She reports back and hip pain and is undergoing physical therapy.  She had x-rays which revealed arthritis.  Denies persistent headaches, cough, shortness of breath, abdominal pain.  She continues on anastrozole.          Review of Systems   Constitutional:  Negative for activity change, appetite change, chills, fatigue, fever and unexpected weight change.   Respiratory:  Negative for cough and shortness of breath.    Cardiovascular:  Negative for chest pain.   Gastrointestinal:  Negative for abdominal pain, constipation, diarrhea, nausea and vomiting.   Musculoskeletal:  Positive for  "arthralgias (hip) and back pain. Negative for gait problem and myalgias.   Skin:  Negative for color change and rash.   Neurological:  Negative for dizziness and headaches.   Hematological:  Negative for adenopathy.   Psychiatric/Behavioral:  Negative for agitation and confusion.    All other systems reviewed and are negative.      Objective:    Blood pressure 128/86, pulse 68, temperature 98.2 °F (36.8 °C), temperature source Temporal, resp. rate 16, height 5' 3\" (1.6 m), weight 89 kg (196 lb 3.2 oz), SpO2 99%.  Body mass index is 34.76 kg/m².      Physical Exam  Vitals reviewed.   Constitutional:       General: She is not in acute distress.     Appearance: Normal appearance. She is well-developed. She is not diaphoretic.   HENT:      Head: Normocephalic and atraumatic.   Cardiovascular:      Rate and Rhythm: Normal rate and regular rhythm.      Heart sounds: Normal heart sounds.   Pulmonary:      Effort: Pulmonary effort is normal.      Breath sounds: Normal breath sounds.   Chest:   Breasts:     Right: Skin change (surgical scars) present. No swelling, bleeding, inverted nipple, mass, nipple discharge or tenderness.      Left: No swelling, bleeding, inverted nipple, mass, nipple discharge, skin change or tenderness.      Comments: Mild fibrosis s/p IORT  Abdominal:      Palpations: Abdomen is soft. There is no mass.      Tenderness: There is no abdominal tenderness.   Musculoskeletal:         General: Normal range of motion.      Cervical back: Normal range of motion.   Lymphadenopathy:      Upper Body:      Right upper body: No supraclavicular or axillary adenopathy.      Left upper body: No supraclavicular or axillary adenopathy.   Skin:     General: Skin is warm and dry.      Findings: No rash.   Neurological:      Mental Status: She is alert and oriented to person, place, and time.   Psychiatric:         Speech: Speech normal.         Discussed symptoms related to disease recurrence, Yes    Evaluated for late " effects related to cancer treatment, Yes    Screening current for cervical cancer, Yes, describe: has appt next month with gyn    Screening current for colon cancer, Yes, describe: colonoscopy 2023, repeat in 7 years    Cancer rehabilitation services addressed, No    Screening current for osteoporosis, No encouraged patient to schedule

## 2024-02-07 ENCOUNTER — APPOINTMENT (OUTPATIENT)
Dept: PHYSICAL THERAPY | Facility: CLINIC | Age: 64
End: 2024-02-07
Payer: COMMERCIAL

## 2024-02-08 ENCOUNTER — HOSPITAL ENCOUNTER (OUTPATIENT)
Dept: BONE DENSITY | Facility: HOSPITAL | Age: 64
End: 2024-02-08
Payer: COMMERCIAL

## 2024-02-08 VITALS — BODY MASS INDEX: 34.55 KG/M2 | HEIGHT: 63 IN | WEIGHT: 195 LBS

## 2024-02-08 DIAGNOSIS — Z13.820 SCREENING FOR OSTEOPOROSIS: ICD-10-CM

## 2024-02-08 PROCEDURE — 77080 DXA BONE DENSITY AXIAL: CPT

## 2024-02-12 ENCOUNTER — OFFICE VISIT (OUTPATIENT)
Dept: PHYSICAL THERAPY | Facility: CLINIC | Age: 64
End: 2024-02-12
Payer: COMMERCIAL

## 2024-02-12 DIAGNOSIS — M54.50 CHRONIC RIGHT-SIDED LOW BACK PAIN WITHOUT SCIATICA: ICD-10-CM

## 2024-02-12 DIAGNOSIS — G89.29 CHRONIC RIGHT-SIDED LOW BACK PAIN WITHOUT SCIATICA: ICD-10-CM

## 2024-02-12 DIAGNOSIS — M16.11 PRIMARY OSTEOARTHRITIS OF ONE HIP, RIGHT: ICD-10-CM

## 2024-02-12 DIAGNOSIS — M25.551 RIGHT HIP PAIN: Primary | ICD-10-CM

## 2024-02-12 DIAGNOSIS — M54.16 LUMBAR RADICULOPATHY: ICD-10-CM

## 2024-02-12 PROCEDURE — 97140 MANUAL THERAPY 1/> REGIONS: CPT | Performed by: PHYSICAL THERAPIST

## 2024-02-12 PROCEDURE — 97112 NEUROMUSCULAR REEDUCATION: CPT | Performed by: PHYSICAL THERAPIST

## 2024-02-12 PROCEDURE — 97110 THERAPEUTIC EXERCISES: CPT | Performed by: PHYSICAL THERAPIST

## 2024-02-12 NOTE — PROGRESS NOTES
Daily Note     Today's date: 2024  Patient name: Norma Mercedes  : 1960  MRN: [id number]  Referring provider: Semaj Velazquez MD  Dx:   Encounter Diagnosis     ICD-10-CM    1. Right hip pain  M25.551       2. Chronic right-sided low back pain without sciatica  M54.50     G89.29       3. Lumbar radiculopathy  M54.16       4. Primary osteoarthritis of one hip, right  M16.11           Start Time: 945  Stop Time: 1025  Total time in clinic (min): 40 minutes    Subjective: Increased pain with lifting right leg (car transfers, stairs, walking). 3/10 pain today, but states pain will increase by the end of the week. Prone press ups feel good on back. Most pain located at anterior right groin. F/u with sports medicine at the end of the month. Did receive injection a few weeks ago, but states she lifted her leg a few days after and had increased pain that has not decreased since. No longer having spasms with bridge activity.       Objective: See treatment diary below      Assessment: Tolerated treatment well. Pain with active hip flexion at anterior right groin. Tendency for leg to rotate into ER due to pain, able to self correct with VC. Noted restriction of thoracic mobility with limited thoracic curvature with performing PPU.  Patient demonstrated fatigue post treatment and would benefit from continued PT      Plan: Continue per plan of care.  Progress treatment as tolerated.       Precautions: CA  Access code: [email or web address]  Progress note:   POC:     Manuals      Thoracic and upper lumbar PA Grade III Muscle energy Grade III-IV     PPU with OP *                       Neuro Re-Ed        UBE (for standing stability trunk control) * 100/70 x4 min /70 6 min alt     Core brace * :05x10 :05x10     Knee fall out * x10 10x                     Bird dog                        Ther Ex                Standing lumbar extension With thumbs as fulcrum 10x // bars x10 // bars     PPU  x10 10x    "  Bridge :05x10 :03 x10 :03 x10     Prone hip extension        Standing hip extension knee flexion and straight   nv     Prone glut set  x10 10x     Heel slide flex   SB flexion 10x     Heel slide abd   Board 10x     Supine ball squeeze * :05x10 :05x10     Supine clamshell *  Pain with SL Supine red :05x10 Side lying 10x bilat             Hip flexor stretch At step :15x4 6\" :15x4 B/L 6\" :15x4 B/L             Ther Activity        Side steps  10ftx4 10ftx4     Tandem walk  10 ftx4 10ftx4     Step ups        Sit to stand        Gait Training                        Modalities        Lumbar mechanical traction                  Access Code: [email or web address]  URL: [email or web address]  Date: 01/31/2024  Prepared by: Genia Murrell    Exercises  - Supine Bridge  - 3 x daily - 7 x weekly - 1 sets - 10 reps - 5 sec hold  - Hip Flexor Stretch on Step  - 3 x daily - 7 x weekly - 1 sets - 4 reps - 15 sec hold  - Standing Lumbar Extension  - 6 x daily - 7 x weekly - 1 sets - 10 reps  - Tandem Walking with Counter Support  - 2 x daily - 7 x weekly - 1 sets - 4 reps  - Side Stepping with Counter Support  - 2 x daily - 7 x weekly - 1 sets - 4 reps  - Hooklying Clamshell with Resistance  - 2 x daily - 7 x weekly - 1 sets - 10 reps - 5 sec hold  - Supine Hip Adduction Isometric with Ball  - 2 x daily - 7 x weekly - 1 sets - 10 reps - 5 sec hold  - Supine Transversus Abdominis Bracing - Hands on Stomach  - 2 x daily - 7 x weekly - 1 sets - 10 reps - 5 sec hold  - Bent Knee Fallouts  - 2 x daily - 7 x weekly - 1 sets - 10 reps  - Prone Gluteal Sets  - 2 x daily - 7 x weekly - 1 sets - 10 reps - 5 sec hold  - Prone Press Up  - 2 x daily - 7 x weekly - 1 sets - 10 reps         "

## 2024-02-14 ENCOUNTER — OFFICE VISIT (OUTPATIENT)
Dept: PHYSICAL THERAPY | Facility: CLINIC | Age: 64
End: 2024-02-14
Payer: COMMERCIAL

## 2024-02-14 DIAGNOSIS — G89.29 CHRONIC RIGHT-SIDED LOW BACK PAIN WITHOUT SCIATICA: ICD-10-CM

## 2024-02-14 DIAGNOSIS — M54.50 CHRONIC RIGHT-SIDED LOW BACK PAIN WITHOUT SCIATICA: ICD-10-CM

## 2024-02-14 DIAGNOSIS — M16.11 PRIMARY OSTEOARTHRITIS OF ONE HIP, RIGHT: ICD-10-CM

## 2024-02-14 DIAGNOSIS — M54.16 LUMBAR RADICULOPATHY: ICD-10-CM

## 2024-02-14 DIAGNOSIS — M25.551 RIGHT HIP PAIN: Primary | ICD-10-CM

## 2024-02-14 PROCEDURE — 97140 MANUAL THERAPY 1/> REGIONS: CPT | Performed by: PHYSICAL THERAPIST

## 2024-02-14 PROCEDURE — 97110 THERAPEUTIC EXERCISES: CPT | Performed by: PHYSICAL THERAPIST

## 2024-02-14 NOTE — PROGRESS NOTES
Daily Note     Today's date: 2024  Patient name: Norma Mercedes  : 1960  MRN: [id number]  Referring provider: Semaj Velazquez MD  Dx:   Encounter Diagnosis     ICD-10-CM    1. Right hip pain  M25.551       2. Chronic right-sided low back pain without sciatica  M54.50     G89.29       3. Lumbar radiculopathy  M54.16       4. Primary osteoarthritis of one hip, right  M16.11           Start Time: 1030  Stop Time: 1115  Total time in clinic (min): 45 minutes    Subjective: Patient states she had increased pain after Monday's session. Feeling pain in R groin when bringing knee up. Worse at the end of the day.       Objective: See treatment diary below      Assessment: Tolerated treatment well. Most pain noted in anterior R thigh with hip flexion active > passive indicating possibility of hip flexor dysfunction. Noted continued ipsilateral side bend with walking not seen on contralateral side. Patient exhibited good technique with therapeutic exercises and would benefit from continued PT      Plan: Continue per plan of care.  Progress treatment as tolerated.       Precautions: CA  Access code: [email or web address]  Progress note:   POC:     Manuals     Thoracic and upper lumbar PA Grade III Muscle energy Grade III-IV Grade II-IV    PPU with OP *       R hip flexor stretch    5x:30            Neuro Re-Ed        UBE (for standing stability trunk control) * 100/70 x4 min /70 6 min alt 100/70 6 min alt    Core brace * :05x10 :05x10 :05x10    Knee fall out * x10 10x 10x     Glut set    :05x10            Bird dog                        Ther Ex                Standing lumbar extension With thumbs as fulcrum 10x // bars x10 // bars     PPU  x10 10x 10x    Bridge :05x10 :03 x10 :03 x10     Prone hip extension        Standing hip extension knee flexion and straight   nv     Prone glut set  x10 10x neuro    Prone bilateral knee extension    10x    Heel slide flex   SB flexion 10x     Heel slide abd  "  Board 10x     Supine ball squeeze * :05x10 :05x10 :05x10    Supine clamshell *  Pain with SL Supine red :05x10 Side lying 10x bilat Supine red 10x    Prone lay    2 positions up and down 15 min    Hip flexor stretch At step :15x4 6\" :15x4 B/L 6\" :15x4 B/L             Ther Activity        Side steps  10ftx4 10ftx4     Tandem walk  10 ftx4 10ftx4     Step ups        Sit to stand        Gait Training                        Modalities        Lumbar mechanical traction                  Access Code: [email or web address]  URL: [email or web address]  Date: 01/31/2024  Prepared by: Genia Murrell    Exercises  - Supine Bridge  - 3 x daily - 7 x weekly - 1 sets - 10 reps - 5 sec hold  - Hip Flexor Stretch on Step  - 3 x daily - 7 x weekly - 1 sets - 4 reps - 15 sec hold  - Standing Lumbar Extension  - 6 x daily - 7 x weekly - 1 sets - 10 reps  - Tandem Walking with Counter Support  - 2 x daily - 7 x weekly - 1 sets - 4 reps  - Side Stepping with Counter Support  - 2 x daily - 7 x weekly - 1 sets - 4 reps  - Hooklying Clamshell with Resistance  - 2 x daily - 7 x weekly - 1 sets - 10 reps - 5 sec hold  - Supine Hip Adduction Isometric with Ball  - 2 x daily - 7 x weekly - 1 sets - 10 reps - 5 sec hold  - Supine Transversus Abdominis Bracing - Hands on Stomach  - 2 x daily - 7 x weekly - 1 sets - 10 reps - 5 sec hold  - Bent Knee Fallouts  - 2 x daily - 7 x weekly - 1 sets - 10 reps  - Prone Gluteal Sets  - 2 x daily - 7 x weekly - 1 sets - 10 reps - 5 sec hold  - Prone Press Up  - 2 x daily - 7 x weekly - 1 sets - 10 reps           "

## 2024-02-19 ENCOUNTER — OFFICE VISIT (OUTPATIENT)
Dept: FAMILY MEDICINE CLINIC | Facility: CLINIC | Age: 64
End: 2024-02-19
Payer: COMMERCIAL

## 2024-02-19 VITALS
HEIGHT: 63 IN | TEMPERATURE: 97.6 F | BODY MASS INDEX: 35.08 KG/M2 | SYSTOLIC BLOOD PRESSURE: 184 MMHG | WEIGHT: 198 LBS | HEART RATE: 64 BPM | DIASTOLIC BLOOD PRESSURE: 93 MMHG | OXYGEN SATURATION: 100 %

## 2024-02-19 DIAGNOSIS — E66.9 OBESITY (BMI 30-39.9): ICD-10-CM

## 2024-02-19 DIAGNOSIS — M85.851 OSTEOPENIA OF NECK OF RIGHT FEMUR: Primary | ICD-10-CM

## 2024-02-19 DIAGNOSIS — I10 PRIMARY HYPERTENSION: ICD-10-CM

## 2024-02-19 DIAGNOSIS — E03.9 HYPOTHYROIDISM, UNSPECIFIED TYPE: ICD-10-CM

## 2024-02-19 DIAGNOSIS — I10 ESSENTIAL HYPERTENSION: ICD-10-CM

## 2024-02-19 PROCEDURE — 99214 OFFICE O/P EST MOD 30 MIN: CPT | Performed by: FAMILY MEDICINE

## 2024-02-19 RX ORDER — HYDROCHLOROTHIAZIDE 25 MG/1
25 TABLET ORAL DAILY
Qty: 90 TABLET | Refills: 3 | Status: SHIPPED | OUTPATIENT
Start: 2024-02-19

## 2024-02-19 NOTE — ASSESSMENT & PLAN NOTE
Discussed with patient that there is not currently indication for medication management for this.    Plan:  Patient with osteopenia of the right femoral neck in the setting of Arimidex therapy  Recommendation for further laboratory workup including CBC, CMP, vitamin D, PTH, and Phosphorus  Patient to increase supplementation to 1200 mg of calcium and 800 IUs of vitamin D daily with weightbearing exercise  Follow-up in 6 weeks for review of laboratory workup or sooner as needed

## 2024-02-19 NOTE — PROGRESS NOTES
Family Medicine Follow-Up Office Visit  Norma Mercedes 63 y.o. female   MRN: [id number] : 1960  ENCOUNTER: 2024       Assessment and Plan   1. Osteopenia of neck of right femur  Assessment & Plan:  Discussed with patient that there is not currently indication for medication management for this.    Plan:  Patient with osteopenia of the right femoral neck in the setting of Arimidex therapy  Recommendation for further laboratory workup including CBC, CMP, vitamin D, PTH, and Phosphorus  Patient to increase supplementation to 1200 mg of calcium and 800 IUs of vitamin D daily with weightbearing exercise  Follow-up in 6 weeks for review of laboratory workup or sooner as needed    Orders:  -     CBC; Future  -     Comprehensive metabolic panel; Future  -     Vitamin D 25 hydroxy; Future  -     PTH, intact; Future  -     Phosphorus; Future    2. Essential hypertension  -     Lipid Panel with Direct LDL reflex; Future  -     hydroCHLOROthiazide 25 mg tablet; Take 1 tablet (25 mg total) by mouth daily    3. Primary hypertension  Assessment & Plan:  Vitals:    24 0908   BP: (!) 184/93   Pulse: 64   Temp: 97.6 °F (36.4 °C)   SpO2: 100%      Repeat /110    Uncontrolled hypertension noted at today's visit    Plan:    Continue losartan 100 mg daily   Increase HCTZ from 12.5 mg daily to 25 mg daily  Recommend completing labs that are ordered in 2 weeks to reassess CMP following dose increase  Recommend lipid panel for further evaluation in the setting of BMI 35  Recommend follow-up in 4 to 6 weeks or sooner as needed    Orders:  -     Lipid Panel with Direct LDL reflex; Future    4. Obesity (BMI 30-39.9)  -     Lipid Panel with Direct LDL reflex; Future    5. Hypothyroidism, unspecified type  Assessment & Plan:  Continue levothyroxine 50 mcg daily    Repeat TSH for further evaluation    Orders:  -     TSH, 3rd generation with Free T4 reflex; Future             Chief Complaint     Chief Complaint   Patient  "presents with   • Follow-up       History of Present Illness   Norma Mercedes is a 63 y.o.-year-old female with past medical history of hypothyroidism, hypertension, breast cancer who presents today for review of her recent DEXA scan results indicating osteopenia.    Patient notes that she does have significant arthritis of the right hip which would be consistent with the area of femoral neck decreased bone density.  Patient notes that she does take a multivitamin and a calcium supplement 1 tablet each daily.  She is curious to know if we recommend starting medication for this.    Review of Systems   Review of Systems   Constitutional:  Negative for chills and fever.   Respiratory:  Negative for shortness of breath.    Cardiovascular:  Negative for chest pain.   Musculoskeletal:  Positive for arthralgias and myalgias. Negative for back pain and neck pain.       Active Problem List     Patient Active Problem List   Diagnosis   • Benign meningioma (HCC)   • Hypertension   • Hypothyroidism   • Thickened endometrium   • Closed fracture of manubrium   • Lymphangitis   • Hyponatremia   • Leukocytosis   • Malignant neoplasm of lower-outer quadrant of right breast of female, estrogen receptor positive    • Family history of breast cancer in mother   • Vertigo   • BRCA negative   • Use of anastrozole   • Tendinopathy of right biceps tendon   • Osteopenia of neck of right femur       Past Medical History, Past Surgical History, Family History, and Social History were reviewed and updated today as appropriate.    Objective   BP (!) 184/93 (BP Location: Left arm, Patient Position: Sitting, Cuff Size: Large)   Pulse 64   Temp 97.6 °F (36.4 °C) (Tympanic)   Ht 5' 3\" (1.6 m)   Wt 89.8 kg (198 lb)   LMP  (LMP Unknown)   SpO2 100%   BMI 35.07 kg/m²     Physical Exam  Constitutional:       Appearance: She is well-developed.      Comments: Repeat /110   HENT:      Head: Normocephalic and atraumatic.      Right Ear: " "External ear normal.      Left Ear: External ear normal.   Eyes:      General: No scleral icterus.     Conjunctiva/sclera: Conjunctivae normal.   Cardiovascular:      Rate and Rhythm: Normal rate and regular rhythm.      Heart sounds: No murmur heard.     No friction rub. No gallop.   Pulmonary:      Effort: Pulmonary effort is normal.      Breath sounds: Normal breath sounds. No wheezing, rhonchi or rales.   Abdominal:      General: Bowel sounds are normal.   Musculoskeletal:      Right lower leg: No edema.      Left lower leg: No edema.   Skin:     General: Skin is warm and dry.   Neurological:      General: No focal deficit present.      Mental Status: She is alert and oriented to person, place, and time.   Psychiatric:         Mood and Affect: Mood normal.         Behavior: Behavior normal.         Pertinent Laboratory/Diagnostic Studies:  Lab Results   Component Value Date    GLUCOSE 128 06/19/2014    BUN 14 02/21/2023    CREATININE 0.81 02/21/2023    CALCIUM 9.4 06/09/2022     06/19/2014    K 3.7 06/09/2022    CO2 29 06/09/2022     06/09/2022     Lab Results   Component Value Date    ALT 41 06/09/2022    AST 29 06/09/2022    ALKPHOS 70 06/09/2022       Lab Results   Component Value Date    WBC 3.90 (L) 06/09/2022    HGB 13.2 06/09/2022    HCT 39.2 06/09/2022    MCV 86 06/09/2022     06/09/2022       No results found for: \"TSH\"    No results found for: \"CHOL\"  No results found for: \"TRIG\"  No results found for: \"HDL\"  No results found for: \"LDLCALC\"  Lab Results   Component Value Date    HGBA1C 5.5 11/12/2019       Results for orders placed or performed during the hospital encounter of 09/13/23   Tissue Exam   Result Value Ref Range    Case Report       Surgical Pathology Report                         Case: [id number]                                   Authorizing Provider:  Aiden South MD  Collected:           09/13/2023 0948              Ordering Location:     [facility]" "      Received:            09/13/2023 [address] [place] [address] Endoscopy                                                     Pathologist:           Kd Dior MD                                                            Specimen:    Polyp, Colorectal, ascending colon polyp - cold snare                                      Final Diagnosis       A. Colon, ascending, polyp - cold snare:   - Tubular adenoma, negative for high-grade dysplasia.        Additional Information       All reported additional testing was performed with appropriately reactive controls.  These tests were developed and their performance characteristics determined by [facility] or appropriate performing facility, though some tests may be performed on tissues which have not been validated for performance characteristics (such as staining performed on alcohol exposed cell blocks and decalcified tissues).  Results should be interpreted with caution and in the context of the patients’ clinical condition. These tests may not be cleared or approved by the U.S. Food and Drug Administration, though the FDA has determined that such clearance or approval is not necessary. These tests are used for clinical purposes and they should not be regarded as investigational or for research. This laboratory has been approved by CLIA 88, designated as a high-complexity laboratory and is qualified to perform these tests.    Interpretation performed at [facility] [address] [facility] [address].      Synoptic Checklist          COLON/RECTUM POLYP FORM - GI - A          :    Adenoma(s)      Gross Description       A. The specimen is received in formalin, labeled with the patient's name and hospital number, and is designated \" ascending colon polyp-cold snare\".  The specimen consists of a single pale tan friable soft tissue fragment measuring 0.6 cm in greatest dimension.  Entirely submitted. Screened " cassette.    Note: The estimated total formalin fixation time based upon information provided by the submitting clinician and the standard processing schedule is under 72 hours.    SSarginson         Orders Placed This Encounter   Procedures   • CBC   • Comprehensive metabolic panel   • Vitamin D 25 hydroxy   • PTH, intact   • Phosphorus   • Lipid Panel with Direct LDL reflex   • TSH, 3rd generation with Free T4 reflex         Current Medications     Current Outpatient Medications   Medication Sig Dispense Refill   • acetaminophen (TYLENOL) 650 mg CR tablet Take 1 tablet (650 mg total) by mouth every 8 (eight) hours as needed for mild pain 30 tablet 0   • albuterol (Ventolin HFA) 90 mcg/act inhaler Inhale 2 puffs every 6 (six) hours as needed for wheezing 18 g 5   • anastrozole (ARIMIDEX) 1 mg tablet TAKE 1 TABLET DAILY 90 tablet 3   • Calcium 200 MG TABS Take by mouth daily     • Cholecalciferol (VITAMIN D3) 2000 units capsule Take 2,000 Units by mouth daily     • fexofenadine (ALLEGRA) 180 MG tablet Take 180 mg by mouth daily in the early morning     • Fexofenadine HCl (ALLEGRA ALLERGY PO) Take by mouth     • fluticasone (FLONASE) 50 mcg/act nasal spray 1 spray into each nostril if needed     • hydroCHLOROthiazide 25 mg tablet Take 1 tablet (25 mg total) by mouth daily 90 tablet 3   • levothyroxine 50 mcg tablet TAKE 1 TABLET DAILY 90 tablet 3   • losartan (COZAAR) 100 MG tablet Take 1 tablet (100 mg total) by mouth every morning 90 tablet 3   • Magnesium 500 MG TABS Take by mouth daily     • meloxicam (MOBIC) 15 mg tablet Take 1 tablet (15 mg total) by mouth daily 30 tablet 0   • ondansetron (ZOFRAN) 4 mg tablet Take 4 mg by mouth every 8 (eight) hours as needed     • rizatriptan (MAXALT-MLT) 10 mg disintegrating tablet TAKE ONE TABLET AT ONSET OF HEADACHE. MAY REPEAT ONCE IN 2 HRS AS NEEDED. 27 tablet 0   • ketorolac (TORADOL) 10 mg tablet Take 1 tablet (10 mg total) by mouth every 6 (six) hours as needed for  moderate pain for up to 5 days (Patient not taking: Reported on 1/16/2024) 20 tablet 0     Current Facility-Administered Medications   Medication Dose Route Frequency Provider Last Rate Last Admin   • diphenhydrAMINE (BENADRYL) injection 50 mg  50 mg Intramuscular Q6H PRN Meredith Slade PA-C   50 mg at 02/02/18 1440       ALLERGIES:  Allergies   Allergen Reactions   • Augmentin [Amoxicillin-Pot Clavulanate] Hives and GI Intolerance   • Oxycodone Itching       Health Maintenance     Health Maintenance   Topic Date Due   • HIV Screening  Never done   • Zoster Vaccine (1 of 2) Never done   • Annual Physical  03/12/2019   • Cervical Cancer Screening  03/09/2022   • Influenza Vaccine (1) 09/01/2023   • COVID-19 Vaccine (4 - 2023-24 season) 09/01/2023   • PT PLAN OF CARE  02/23/2024   • Breast Cancer Screening: Mammogram  03/07/2024   • Depression Screening  03/08/2024   • Breast Cancer Survivorship Visit  02/05/2025   • Colorectal Cancer Screening  09/11/2030   • DTaP,Tdap,and Td Vaccines (2 - Td or Tdap) 11/04/2030   • Hepatitis C Screening  Completed   • Osteoporosis Screening  Completed   • Pneumococcal Vaccine: Pediatrics (0 to 5 Years) and At-Risk Patients (6 to 64 Years)  Aged Out   • HIB Vaccine  Aged Out   • IPV Vaccine  Aged Out   • Hepatitis A Vaccine  Aged Out   • Meningococcal ACWY Vaccine  Aged Out   • HPV Vaccine  Aged Out     Immunization History   Administered Date(s) Administered   • COVID-19 MODERNA VACC 0.5 ML IM 02/05/2021, 03/03/2021, 12/17/2021   • INFLUENZA 11/14/2015, 10/02/2017   • Influenza, injectable, quadrivalent, preservative free 0.5 mL 11/14/2019   • Influenza, recombinant, quadrivalent,injectable, preservative free 10/21/2020   • Influenza, seasonal, injectable 10/02/2012, 11/14/2015   • Tdap 11/04/2020         Leticia Houston DO   [facility]  2/19/2024  5:36 PM    Parts of this note were dictated using Dragon dictation software and may have sounds-like errors due to  variation in pronunciation.

## 2024-02-19 NOTE — ASSESSMENT & PLAN NOTE
Vitals:    02/19/24 0908   BP: (!) 184/93   Pulse: 64   Temp: 97.6 °F (36.4 °C)   SpO2: 100%      Repeat /110    Uncontrolled hypertension noted at today's visit    Plan:    Continue losartan 100 mg daily   Increase HCTZ from 12.5 mg daily to 25 mg daily  Recommend completing labs that are ordered in 2 weeks to reassess CMP following dose increase  Recommend lipid panel for further evaluation in the setting of BMI 35  Recommend follow-up in 4 to 6 weeks or sooner as needed

## 2024-02-21 ENCOUNTER — APPOINTMENT (OUTPATIENT)
Dept: PHYSICAL THERAPY | Facility: CLINIC | Age: 64
End: 2024-02-21
Payer: COMMERCIAL

## 2024-02-23 ENCOUNTER — OFFICE VISIT (OUTPATIENT)
Dept: HEMATOLOGY ONCOLOGY | Facility: CLINIC | Age: 64
End: 2024-02-23
Payer: COMMERCIAL

## 2024-02-23 VITALS
WEIGHT: 202 LBS | SYSTOLIC BLOOD PRESSURE: 146 MMHG | HEART RATE: 73 BPM | BODY MASS INDEX: 35.79 KG/M2 | OXYGEN SATURATION: 97 % | HEIGHT: 63 IN | TEMPERATURE: 97.2 F | DIASTOLIC BLOOD PRESSURE: 84 MMHG | RESPIRATION RATE: 18 BRPM

## 2024-02-23 DIAGNOSIS — Z79.811 AROMATASE INHIBITOR USE: ICD-10-CM

## 2024-02-23 DIAGNOSIS — M85.80 OSTEOPENIA, UNSPECIFIED LOCATION: ICD-10-CM

## 2024-02-23 DIAGNOSIS — Z17.0 MALIGNANT NEOPLASM OF LOWER-OUTER QUADRANT OF RIGHT BREAST OF FEMALE, ESTROGEN RECEPTOR POSITIVE: Primary | ICD-10-CM

## 2024-02-23 DIAGNOSIS — C50.511 MALIGNANT NEOPLASM OF LOWER-OUTER QUADRANT OF RIGHT BREAST OF FEMALE, ESTROGEN RECEPTOR POSITIVE: Primary | ICD-10-CM

## 2024-02-23 PROCEDURE — 99214 OFFICE O/P EST MOD 30 MIN: CPT | Performed by: INTERNAL MEDICINE
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
## 2024-02-26 ENCOUNTER — EVALUATION (OUTPATIENT)
Dept: PHYSICAL THERAPY | Facility: CLINIC | Age: 64
End: 2024-02-26
Payer: COMMERCIAL

## 2024-02-26 DIAGNOSIS — M25.551 RIGHT HIP PAIN: Primary | ICD-10-CM

## 2024-02-26 DIAGNOSIS — M54.16 LUMBAR RADICULOPATHY: ICD-10-CM

## 2024-02-26 DIAGNOSIS — G89.29 CHRONIC RIGHT-SIDED LOW BACK PAIN WITHOUT SCIATICA: ICD-10-CM

## 2024-02-26 DIAGNOSIS — M54.50 CHRONIC RIGHT-SIDED LOW BACK PAIN WITHOUT SCIATICA: ICD-10-CM

## 2024-02-26 DIAGNOSIS — M16.11 PRIMARY OSTEOARTHRITIS OF ONE HIP, RIGHT: ICD-10-CM

## 2024-02-26 PROCEDURE — 97110 THERAPEUTIC EXERCISES: CPT | Performed by: PHYSICAL THERAPIST

## 2024-02-26 PROCEDURE — 97112 NEUROMUSCULAR REEDUCATION: CPT | Performed by: PHYSICAL THERAPIST
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
## 2024-02-28 ENCOUNTER — OFFICE VISIT (OUTPATIENT)
Dept: OBGYN CLINIC | Facility: CLINIC | Age: 64
End: 2024-02-28
Payer: COMMERCIAL

## 2024-02-28 VITALS
HEIGHT: 63 IN | HEART RATE: 65 BPM | WEIGHT: 197.6 LBS | SYSTOLIC BLOOD PRESSURE: 143 MMHG | BODY MASS INDEX: 35.01 KG/M2 | DIASTOLIC BLOOD PRESSURE: 85 MMHG | TEMPERATURE: 98.1 F

## 2024-02-28 DIAGNOSIS — G89.29 CHRONIC RIGHT-SIDED LOW BACK PAIN WITHOUT SCIATICA: ICD-10-CM

## 2024-02-28 DIAGNOSIS — M54.50 CHRONIC RIGHT-SIDED LOW BACK PAIN WITHOUT SCIATICA: ICD-10-CM

## 2024-02-28 DIAGNOSIS — M54.16 LUMBAR RADICULOPATHY: ICD-10-CM

## 2024-02-28 DIAGNOSIS — M16.11 PRIMARY OSTEOARTHRITIS OF ONE HIP, RIGHT: Primary | ICD-10-CM

## 2024-02-28 PROCEDURE — 99214 OFFICE O/P EST MOD 30 MIN: CPT | Performed by: FAMILY MEDICINE

## 2024-02-28 RX ORDER — PREDNISONE 10 MG/1
TABLET ORAL
Qty: 28 TABLET | Refills: 0 | Status: SHIPPED | OUTPATIENT
Start: 2024-02-28

## 2024-02-28 NOTE — PROGRESS NOTES
[facility] [address] [place]  [place] [address] [phone]      Assessment:  1. Primary osteoarthritis of one hip, right  -     predniSONE 10 mg tablet; Take 7T PO x 1 day, then take 6T PO x 1 day, and continue to decrease  by 1T until finished. Quantity-28    2. Lumbar radiculopathy  -     predniSONE 10 mg tablet; Take 7T PO x 1 day, then take 6T PO x 1 day, and continue to decrease  by 1T until finished. Quantity-28    3. Chronic right-sided low back pain without sciatica  -     predniSONE 10 mg tablet; Take 7T PO x 1 day, then take 6T PO x 1 day, and continue to decrease  by 1T until finished. Quantity-28        Plan:  Patient Instructions   F/u 4 wks  Continue therapy  Begin prednisone taper  Avoid ibupofen, meloxicam, aleve, advil while taking steroids.    Tylenol as needed  Icing/heat/OTC pain meds as needed.  Home exercises.  Consider MRI L spine if no improvement.     Return in about 4 weeks (around 3/27/2024) for Recheck.    Chief Complaint:  Chief Complaint   Patient presents with    Right Hip - Follow-up       Subjective:   HPI    Patient ID: Norma Mercedes is a 63 y.o. female     Here for f/u R hip OA and lower back pain/radiculopathy  1)  R hip OA  Injection helped for 3 days  She is a little better  Going to PT  Intermittent pain/worse at end of week  Worse lifting leg/pain walking-   Sharp pain at time/burning pain/tearing pain  Meloxicam- helps her have less pain/sleep    2)  lower back pain/radiculopathy  Worse with twisting/lifting/moving in bed  Less radiation of pain  No n/t      Doing home exercises- not helping  Hurts to bend    Review of Systems   Constitutional:  Negative for fatigue and fever.   Respiratory:  Negative for shortness of breath.    Cardiovascular:  Negative for chest pain.   Gastrointestinal:  Negative for abdominal pain and nausea.   Genitourinary:  Negative for dysuria.   Musculoskeletal:  Positive for arthralgias  "and back pain.   Skin:  Negative for rash and wound.   Neurological:  Negative for weakness and headaches.       Objective:  Vitals:  /85 (BP Location: Left arm, Patient Position: Sitting, Cuff Size: Standard)   Pulse 65   Temp 98.1 °F (36.7 °C) (Tympanic)   Ht 5' 3.25\" (1.607 m)   Wt 89.6 kg (197 lb 9.6 oz)   LMP  (LMP Unknown)   BMI 34.73 kg/m²     The following portions of the patient's history were reviewed and updated as appropriate: allergies, current medications, past family history, past medical history, past social history, past surgical history, and problem list.    Physical exam:  Physical Exam  Constitutional:       Appearance: Normal appearance. She is normal weight.   Eyes:      Extraocular Movements: Extraocular movements intact.   Pulmonary:      Effort: Pulmonary effort is normal.   Musculoskeletal:      Cervical back: Normal range of motion.      Lumbar back: Positive right straight leg raise test. Negative left straight leg raise test.   Skin:     General: Skin is warm and dry.   Neurological:      General: No focal deficit present.      Mental Status: She is alert and oriented to person, place, and time. Mental status is at baseline.   Psychiatric:         Mood and Affect: Mood normal.         Behavior: Behavior normal.         Thought Content: Thought content normal.         Judgment: Judgment normal.       Right Hip Exam     Tenderness   The patient is experiencing tenderness in the anterior.    Range of Motion   The patient has normal right hip ROM.    Muscle Strength   The patient has normal right hip strength.    Tests   VERONICA: positive      Back Exam     Tenderness   The patient is experiencing no tenderness.     Range of Motion   The patient has normal back ROM.    Muscle Strength   The patient has normal back strength.    Tests   Straight leg raise right: positive  Straight leg raise left: negative    Reflexes   Patellar:  normal    Comments:  Pain with ext/lat flex " BERE Velazquez MD

## 2024-02-28 NOTE — PATIENT INSTRUCTIONS
F/u 4 wks  Continue therapy  Begin prednisone taper  Avoid ibupofen, meloxicam, aleve, advil while taking steroids.    Tylenol as needed  Icing/heat/OTC pain meds as needed.  Home exercises.  Consider MRI L spine if no improvement.

## 2024-03-04 ENCOUNTER — OFFICE VISIT (OUTPATIENT)
Dept: PHYSICAL THERAPY | Facility: CLINIC | Age: 64
End: 2024-03-04
Payer: COMMERCIAL

## 2024-03-04 DIAGNOSIS — M25.551 RIGHT HIP PAIN: Primary | ICD-10-CM

## 2024-03-04 DIAGNOSIS — M54.50 CHRONIC RIGHT-SIDED LOW BACK PAIN WITHOUT SCIATICA: ICD-10-CM

## 2024-03-04 DIAGNOSIS — M54.16 LUMBAR RADICULOPATHY: ICD-10-CM

## 2024-03-04 DIAGNOSIS — M16.11 PRIMARY OSTEOARTHRITIS OF ONE HIP, RIGHT: ICD-10-CM

## 2024-03-04 DIAGNOSIS — G89.29 CHRONIC RIGHT-SIDED LOW BACK PAIN WITHOUT SCIATICA: ICD-10-CM

## 2024-03-04 PROCEDURE — 97112 NEUROMUSCULAR REEDUCATION: CPT | Performed by: PHYSICAL THERAPIST

## 2024-03-04 PROCEDURE — 97110 THERAPEUTIC EXERCISES: CPT | Performed by: PHYSICAL THERAPIST

## 2024-03-04 NOTE — PROGRESS NOTES
Daily Note     Today's date: 3/4/2024  Patient name: Norma Mercedes  : 1960  MRN: [id number]  Referring provider: Semaj Velazquez MD  Dx:   Encounter Diagnosis     ICD-10-CM    1. Right hip pain  M25.551       2. Chronic right-sided low back pain without sciatica  M54.50     G89.29       3. Lumbar radiculopathy  M54.16       4. Primary osteoarthritis of one hip, right  M16.11           Start Time: 730  Stop Time: 0800  Total time in clinic (min): 30 minutes    Subjective: Patient states she saw sports medicine last week. Was put on prednisone taper and instructed to continue PT for an additional 4 weeks. Will f/u again at that point and determine need for imaging studies. Continues to have low back pain when turning in bed and when flexing hip.       Objective: See treatment diary below      Assessment: Tolerated treatment fair+. Increased pain today with active flexion activities and passive extension in anterior R hip.  Excessive R lumbar shift with walking minimizing hip extension range of motion during gait. Patient exhibited good technique with therapeutic exercises and would benefit from continued PT      Plan: Progress note during next visit.      Precautions: CA  Access code: [email or web address]  Progress note: 3/26  POC:     Manuals 3/4 1/31 2/12 2/14 2/26   Thoracic and upper lumbar PA  Muscle energy Grade III-IV Grade II-IV Grade II-IV   PPU with OP        R hip flexor stretch    5x:30    Quad roller 8 min in supine hooklying, pain in tony position    HEP review   Neuro Re-Ed        UBE (for standing stability trunk control)  100/70 x4 min /70 6 min alt 100/70 6 min alt 100/70 6 min alt   Core brace :05x10 :05x10 :05x10 :05x10    Knee fall out 10x x10 10x 10x     Glut set    :05x10            Bird dog                        Ther Ex     HEP reivew           Standing lumbar extension  // bars x10 // bars     PPU  x10 10x 10x 10x   Bridge :03x10 :03 x10 :03 x10     Prone hip extension       "  Standing hip extension knee flexion and straight   nv     Prone glut set neuro x10 10x neuro    Prone bilateral knee extension 10x   10x    Heel slide flex   SB flexion 10x     Heel slide abd   Board 10x     Supine ball squeeze 15x :05x10 :05x10 :05x10    Supine clamshell Supine red 15x Supine red :05x10 Side lying 10x bilat Supine red 10x    Prone lay 2 positions up and down   2 positions up and down 15 min    Hip flexor stretch P! In R hip after 3 sets x:15 6\" :15x4 B/L 6\" :15x4 B/L  6\" :15x4 B/L           Ther Activity        Side steps  10ftx4 10ftx4     Tandem walk  10 ftx4 10ftx4     Step ups        Sit to stand        Gait Training                        Modalities        Lumbar mechanical traction                  Access Code: [email or web address]  URL: [email or web address]  Date: 01/31/2024  Prepared by: Genia Murrell    Exercises  - Supine Bridge  - 3 x daily - 7 x weekly - 1 sets - 10 reps - 5 sec hold  - Hip Flexor Stretch on Step  - 3 x daily - 7 x weekly - 1 sets - 4 reps - 15 sec hold  - Standing Lumbar Extension  - 6 x daily - 7 x weekly - 1 sets - 10 reps  - Tandem Walking with Counter Support  - 2 x daily - 7 x weekly - 1 sets - 4 reps  - Side Stepping with Counter Support  - 2 x daily - 7 x weekly - 1 sets - 4 reps  - Hooklying Clamshell with Resistance  - 2 x daily - 7 x weekly - 1 sets - 10 reps - 5 sec hold  - Supine Hip Adduction Isometric with Ball  - 2 x daily - 7 x weekly - 1 sets - 10 reps - 5 sec hold  - Supine Transversus Abdominis Bracing - Hands on Stomach  - 2 x daily - 7 x weekly - 1 sets - 10 reps - 5 sec hold  - Bent Knee Fallouts  - 2 x daily - 7 x weekly - 1 sets - 10 reps  - Prone Gluteal Sets  - 2 x daily - 7 x weekly - 1 sets - 10 reps - 5 sec hold  - Prone Press Up  - 2 x daily - 7 x weekly - 1 sets - 10 reps       "

## 2024-03-07 ENCOUNTER — APPOINTMENT (OUTPATIENT)
Dept: LAB | Facility: CLINIC | Age: 64
End: 2024-03-07
Payer: COMMERCIAL

## 2024-03-07 DIAGNOSIS — I10 ESSENTIAL HYPERTENSION: ICD-10-CM

## 2024-03-07 DIAGNOSIS — I10 PRIMARY HYPERTENSION: ICD-10-CM

## 2024-03-07 DIAGNOSIS — E66.9 OBESITY (BMI 30-39.9): ICD-10-CM

## 2024-03-07 DIAGNOSIS — M85.851 OSTEOPENIA OF NECK OF RIGHT FEMUR: ICD-10-CM

## 2024-03-07 DIAGNOSIS — E03.9 HYPOTHYROIDISM, UNSPECIFIED TYPE: ICD-10-CM

## 2024-03-07 LAB
25(OH)D3 SERPL-MCNC: 53.4 NG/ML (ref 30–100)
ALBUMIN SERPL BCP-MCNC: 4.2 G/DL (ref 3.5–5)
ALP SERPL-CCNC: 63 U/L (ref 34–104)
ALT SERPL W P-5'-P-CCNC: 34 U/L (ref 7–52)
ANION GAP SERPL CALCULATED.3IONS-SCNC: 6 MMOL/L
AST SERPL W P-5'-P-CCNC: 21 U/L (ref 13–39)
BILIRUB SERPL-MCNC: 0.55 MG/DL (ref 0.2–1)
BUN SERPL-MCNC: 19 MG/DL (ref 5–25)
CALCIUM SERPL-MCNC: 9.6 MG/DL (ref 8.4–10.2)
CHLORIDE SERPL-SCNC: 94 MMOL/L (ref 96–108)
CHOLEST SERPL-MCNC: 237 MG/DL
CO2 SERPL-SCNC: 35 MMOL/L (ref 21–32)
CREAT SERPL-MCNC: 0.87 MG/DL (ref 0.6–1.3)
ERYTHROCYTE [DISTWIDTH] IN BLOOD BY AUTOMATED COUNT: 13.2 % (ref 11.6–15.1)
GFR SERPL CREATININE-BSD FRML MDRD: 72 ML/MIN/1.73SQ M
GLUCOSE P FAST SERPL-MCNC: 85 MG/DL (ref 65–99)
HCT VFR BLD AUTO: 41.8 % (ref 34.8–46.1)
HDLC SERPL-MCNC: 64 MG/DL
HGB BLD-MCNC: 14.3 G/DL (ref 11.5–15.4)
LDLC SERPL CALC-MCNC: 126 MG/DL (ref 0–100)
MCH RBC QN AUTO: 28.9 PG (ref 26.8–34.3)
MCHC RBC AUTO-ENTMCNC: 34.2 G/DL (ref 31.4–37.4)
MCV RBC AUTO: 84 FL (ref 82–98)
PHOSPHATE SERPL-MCNC: 3.7 MG/DL (ref 2.3–4.1)
PLATELET # BLD AUTO: 221 THOUSANDS/UL (ref 149–390)
PMV BLD AUTO: 11.8 FL (ref 8.9–12.7)
POTASSIUM SERPL-SCNC: 3.7 MMOL/L (ref 3.5–5.3)
PROT SERPL-MCNC: 6.9 G/DL (ref 6.4–8.4)
PTH-INTACT SERPL-MCNC: 61.4 PG/ML (ref 12–88)
RBC # BLD AUTO: 4.95 MILLION/UL (ref 3.81–5.12)
SODIUM SERPL-SCNC: 135 MMOL/L (ref 135–147)
TRIGL SERPL-MCNC: 235 MG/DL
TSH SERPL DL<=0.05 MIU/L-ACNC: 3.57 UIU/ML (ref 0.45–4.5)
WBC # BLD AUTO: 6.08 THOUSAND/UL (ref 4.31–10.16)

## 2024-03-07 PROCEDURE — 80053 COMPREHEN METABOLIC PANEL: CPT

## 2024-03-07 PROCEDURE — 82306 VITAMIN D 25 HYDROXY: CPT

## 2024-03-07 PROCEDURE — 36415 COLL VENOUS BLD VENIPUNCTURE: CPT

## 2024-03-07 PROCEDURE — 80061 LIPID PANEL: CPT

## 2024-03-07 PROCEDURE — 84443 ASSAY THYROID STIM HORMONE: CPT

## 2024-03-07 PROCEDURE — 84100 ASSAY OF PHOSPHORUS: CPT

## 2024-03-07 PROCEDURE — 85027 COMPLETE CBC AUTOMATED: CPT

## 2024-03-07 PROCEDURE — 83970 ASSAY OF PARATHORMONE: CPT

## 2024-03-07 NOTE — PROGRESS NOTES
[facility] [address] Fiona Carreon PA    ASSESSMENT/PLAN: Norma Mercedes is a 63 y.o.  who presents for annual gynecologic exam.    Encounter for routine gynecologic examination  - Routine well woman exam completed today.  - Cervical Cancer Screening: Current ASCCP Guidelines reviewed. Last Pap: 2017. Next Pap Due: today  - HPV Vaccination status: Not immunized  - STI screening offered including HIV: not indicated based on hx or requested at time of visit  - Breast Cancer Screening: Last Mammogram 2023, script given  - Colorectal cancer screening was not ordered.  - The following were reviewed in today's visit: breast self exam, adequate intake of calcium and vitamin D, and exercise    Additional problems addressed at this visit:  1. Encounter for gynecological examination without abnormal finding  -     Liquid-based pap, screening  -     HPV High Risk    2. Screening for cervical cancer  -     Ambulatory Referral to Gynecology  -     Liquid-based pap, screening  -     HPV High Risk          CC:  Annual Gynecologic Examination    HPI: Norma Mercedes is a 63 y.o.  who presents for annual gynecologic examination.  Jana presents today for gyn exam. No vaginal bleeding since onset of menopause. 2017 last pap smear- normal, Hx of abnormal pap smear- no. Sexually active- no. Does not desire STI testing.  3/2023 mammogram- followed by surgeon- recent lumpectomy for DCIS right breast, and 2023 colonoscopy- repeat 7 yrs. Reports 6-7 hrs of sleep daily, 1-2 servings of calcium rich foods daily, takes a calcium supplement. Exercises: active daily and walks regularly. 1-2 servings of caffeine daily. Occasionally SBE. Safe at home- yes. Wears seatbelt - yes.  Concerns: notes a chronic itch groin, armpits, hands and waste area.         The following portions of the patient's history were reviewed and updated as appropriate: allergies, current medications, past family  "history, past medical history, obstetric history, gynecologic history, past social history, past surgical history and problem list.    Review of Systems   Constitutional:  Negative for chills, fatigue and fever.   Respiratory:  Negative for cough and shortness of breath.    Cardiovascular:  Negative for chest pain, palpitations and leg swelling.   Gastrointestinal:  Negative for constipation and diarrhea.   Genitourinary:  Negative for difficulty urinating, dysuria, frequency, menstrual problem, pelvic pain, urgency, vaginal bleeding, vaginal discharge and vaginal pain.   Neurological:  Negative for light-headedness and headaches.   Psychiatric/Behavioral:  The patient is not nervous/anxious.          Objective:  /82   Ht 5' 3.25\" (1.607 m)   Wt 91.6 kg (202 lb)   LMP  (LMP Unknown)   BMI 35.50 kg/m²    Physical Exam  Vitals reviewed.   Constitutional:       Appearance: Normal appearance.   HENT:      Head: Normocephalic.   Neck:      Thyroid: No thyroid mass or thyroid tenderness.   Cardiovascular:      Rate and Rhythm: Normal rate and regular rhythm.      Heart sounds: Normal heart sounds.   Pulmonary:      Effort: Pulmonary effort is normal.      Breath sounds: Normal breath sounds.   Chest:   Breasts:     Right: No mass or nipple discharge.      Left: No mass, nipple discharge, skin change or tenderness.      Comments: Right breast incision- intact, healing well, good approximation.   Abdominal:      General: There is no distension.      Palpations: There is no mass.      Tenderness: There is no abdominal tenderness. There is no guarding.   Genitourinary:     General: Normal vulva.      Exam position: Lithotomy position.      Labia:         Right: No tenderness or lesion.         Left: No tenderness or lesion.       Vagina: No vaginal discharge, tenderness, bleeding or lesions.      Cervix: No discharge, lesion, erythema or cervical bleeding.      Uterus: Normal. Not enlarged and not tender.       " Adnexa:         Right: No mass, tenderness or fullness.          Left: No mass, tenderness or fullness.     Musculoskeletal:      Cervical back: Normal range of motion.   Lymphadenopathy:      Upper Body:      Right upper body: No axillary adenopathy.      Left upper body: No axillary adenopathy.   Skin:     General: Skin is warm and dry.   Neurological:      Mental Status: She is alert.   Psychiatric:         Mood and Affect: Mood normal.         Behavior: Behavior normal.         Judgment: Judgment normal.             Jaye Peterson CNM  [facility]  03/11/24 1:29 PM

## 2024-03-08 ENCOUNTER — OFFICE VISIT (OUTPATIENT)
Dept: OBGYN CLINIC | Facility: CLINIC | Age: 64
End: 2024-03-08
Payer: COMMERCIAL

## 2024-03-08 VITALS
WEIGHT: 202 LBS | HEIGHT: 63 IN | BODY MASS INDEX: 35.79 KG/M2 | DIASTOLIC BLOOD PRESSURE: 82 MMHG | SYSTOLIC BLOOD PRESSURE: 128 MMHG

## 2024-03-08 DIAGNOSIS — Z01.419 ENCOUNTER FOR GYNECOLOGICAL EXAMINATION WITHOUT ABNORMAL FINDING: Primary | ICD-10-CM

## 2024-03-08 DIAGNOSIS — Z12.4 SCREENING FOR CERVICAL CANCER: ICD-10-CM

## 2024-03-08 PROCEDURE — S0610 ANNUAL GYNECOLOGICAL EXAMINA: HCPCS | Performed by: ADVANCED PRACTICE MIDWIFE

## 2024-03-08 PROCEDURE — G0145 SCR C/V CYTO,THINLAYER,RESCR: HCPCS | Performed by: ADVANCED PRACTICE MIDWIFE

## 2024-03-08 PROCEDURE — G0476 HPV COMBO ASSAY CA SCREEN: HCPCS | Performed by: ADVANCED PRACTICE MIDWIFE

## 2024-03-11 ENCOUNTER — OFFICE VISIT (OUTPATIENT)
Dept: PHYSICAL THERAPY | Facility: CLINIC | Age: 64
End: 2024-03-11
Payer: COMMERCIAL

## 2024-03-11 DIAGNOSIS — G89.29 CHRONIC RIGHT-SIDED LOW BACK PAIN WITHOUT SCIATICA: ICD-10-CM

## 2024-03-11 DIAGNOSIS — M54.50 CHRONIC RIGHT-SIDED LOW BACK PAIN WITHOUT SCIATICA: ICD-10-CM

## 2024-03-11 DIAGNOSIS — M16.11 PRIMARY OSTEOARTHRITIS OF ONE HIP, RIGHT: ICD-10-CM

## 2024-03-11 DIAGNOSIS — M54.16 LUMBAR RADICULOPATHY: ICD-10-CM

## 2024-03-11 DIAGNOSIS — M25.551 RIGHT HIP PAIN: Primary | ICD-10-CM

## 2024-03-11 LAB
HPV HR 12 DNA CVX QL NAA+PROBE: NEGATIVE
HPV16 DNA CVX QL NAA+PROBE: NEGATIVE
HPV18 DNA CVX QL NAA+PROBE: NEGATIVE

## 2024-03-11 PROCEDURE — 97112 NEUROMUSCULAR REEDUCATION: CPT | Performed by: PHYSICAL THERAPIST

## 2024-03-11 PROCEDURE — 97140 MANUAL THERAPY 1/> REGIONS: CPT | Performed by: PHYSICAL THERAPIST

## 2024-03-11 PROCEDURE — 97110 THERAPEUTIC EXERCISES: CPT | Performed by: PHYSICAL THERAPIST

## 2024-03-11 NOTE — PROGRESS NOTES
Daily Note     Today's date: 3/11/2024  Patient name: Norma Mercedes  : 1960  MRN: [id number]  Referring provider: Semaj Velazquez MD  Dx:   Encounter Diagnosis     ICD-10-CM    1. Right hip pain  M25.551       2. Chronic right-sided low back pain without sciatica  M54.50     G89.29       3. Lumbar radiculopathy  M54.16       4. Primary osteoarthritis of one hip, right  M16.11                   Subjective: Pt reports that she feels pain in her foot that feels like a wrinkled sock.       Objective: See treatment diary below      Assessment: Tolerated treatment well. Patient would benefit from continued PT. She was able to tolerate progressions in her program without adverse effects. She was unable to tolerate full 15 min of prone lying secondary to shoulder pain. Continue to progress lumbar mobility and core stability in order to maximize her LOF.       Plan: Continue per plan of care.  Progress treatment as tolerated.       Precautions: CA  Access code: [email or web address]  Progress note: 3/26  POC:     Manuals 3/4 3/11  2/14 2/26   Thoracic and upper lumbar PA    Grade II-IV Grade II-IV   PPU with OP        R hip flexor stretch    5x:30    Quad roller 8 min in supine hooklying, pain in tony position 8 min in supine hooklying, pain in tony position   HEP review   Neuro Re-Ed        UBE (for standing stability trunk control)  100/70 6 min alt  100/70 6 min alt 100/70 6 min alt   Core brace :05x10 :05x10  :05x10    Knee fall out 10x 10x  10x     Glut set    :05x10            Bird dog                        Ther Ex     HEP reivew           Standing lumbar extension        PPU    10x 10x   Bridge :03x10 :03 x15      Prone hip extension        Standing hip extension knee flexion and straight        Prone glut set neuro :5 15  neuro    Prone bilateral knee flexion 10x x15  10x    Heel slide flex        Heel slide abd        Supine ball squeeze 15x 20x  :05x10    Supine clamshell Supine red 15x Supine red :05 x20   "Supine red 10x    Prone lay 2 positions up and down 2 positions up and down 10 min  2 positions up and down 15 min    Hip flexor stretch P! In R hip after 3 sets x:15    6\" :15x4 B/L           Ther Activity        Side steps        Tandem walk        Step ups        Sit to stand        Gait Training                        Modalities        Lumbar mechanical traction                  Access Code: [email or web address]  URL: [email or web address]  Date: 01/31/2024  Prepared by: Genia Murrell    Exercises  - Supine Bridge  - 3 x daily - 7 x weekly - 1 sets - 10 reps - 5 sec hold  - Hip Flexor Stretch on Step  - 3 x daily - 7 x weekly - 1 sets - 4 reps - 15 sec hold  - Standing Lumbar Extension  - 6 x daily - 7 x weekly - 1 sets - 10 reps  - Tandem Walking with Counter Support  - 2 x daily - 7 x weekly - 1 sets - 4 reps  - Side Stepping with Counter Support  - 2 x daily - 7 x weekly - 1 sets - 4 reps  - Hooklying Clamshell with Resistance  - 2 x daily - 7 x weekly - 1 sets - 10 reps - 5 sec hold  - Supine Hip Adduction Isometric with Ball  - 2 x daily - 7 x weekly - 1 sets - 10 reps - 5 sec hold  - Supine Transversus Abdominis Bracing - Hands on Stomach  - 2 x daily - 7 x weekly - 1 sets - 10 reps - 5 sec hold  - Bent Knee Fallouts  - 2 x daily - 7 x weekly - 1 sets - 10 reps  - Prone Gluteal Sets  - 2 x daily - 7 x weekly - 1 sets - 10 reps - 5 sec hold  - Prone Press Up  - 2 x daily - 7 x weekly - 1 sets - 10 reps         "

## 2024-03-15 LAB
LAB AP GYN PRIMARY INTERPRETATION: NORMAL
Lab: NORMAL

## 2024-03-18 ENCOUNTER — OFFICE VISIT (OUTPATIENT)
Dept: PHYSICAL THERAPY | Facility: CLINIC | Age: 64
End: 2024-03-18
Payer: COMMERCIAL

## 2024-03-18 DIAGNOSIS — G89.29 CHRONIC RIGHT-SIDED LOW BACK PAIN WITHOUT SCIATICA: ICD-10-CM

## 2024-03-18 DIAGNOSIS — M54.50 CHRONIC RIGHT-SIDED LOW BACK PAIN WITHOUT SCIATICA: ICD-10-CM

## 2024-03-18 DIAGNOSIS — M16.11 PRIMARY OSTEOARTHRITIS OF ONE HIP, RIGHT: ICD-10-CM

## 2024-03-18 DIAGNOSIS — M25.551 RIGHT HIP PAIN: Primary | ICD-10-CM

## 2024-03-18 DIAGNOSIS — M54.16 LUMBAR RADICULOPATHY: ICD-10-CM

## 2024-03-18 PROCEDURE — 97112 NEUROMUSCULAR REEDUCATION: CPT | Performed by: PHYSICAL THERAPIST

## 2024-03-18 PROCEDURE — 97110 THERAPEUTIC EXERCISES: CPT | Performed by: PHYSICAL THERAPIST

## 2024-03-18 NOTE — PROGRESS NOTES
Daily Note     Today's date: 3/18/2024  Patient name: Norma Mercedes  : 1960  MRN: [id number]  Referring provider: Semaj Velazquez MD  Dx:   Encounter Diagnosis     ICD-10-CM    1. Right hip pain  M25.551       2. Chronic right-sided low back pain without sciatica  M54.50     G89.29       3. Lumbar radiculopathy  M54.16       4. Primary osteoarthritis of one hip, right  M16.11           Start Time: 0900  Stop Time: 945  Total time in clinic (min): 45 minutes    Subjective: Patient states she is feeling ok today. Had increased numbness in left lateral and plantar foot last week increased with walking.       Objective: See treatment diary below      Assessment: Tolerated treatment well. Most benefit from core and pelvic stability exercises. Did note tightness of hip adductor on R possibly contributing to symptoms. Discussed mechanical testing today and instructed patient to attempt repetitive movements when foot numbness increases to determine direction of preference. Patient exhibited good technique with therapeutic exercises and would benefit from continued PT      Plan: Continue per plan of care.  Progress treatment as tolerated.       Precautions: CA  Access code: [email or web address]  Progress note: 3/26  POC:     Manuals 3/4 3/11 3/18 2/14 2/26   Thoracic and upper lumbar PA    Grade II-IV Grade II-IV   PPU with OP        R hip flexor stretch    5x:30    Quad roller 8 min in supine hooklying, pain in tony position 8 min in supine hooklying, pain in tony position 8 min in supine hooklying, pain in tony position  HEP review   Neuro Re-Ed        UBE (for standing stability trunk control)  100/70 6 min alt 100/70 6 min alt 100/70 6 min alt 100/70 6 min alt   Core brace :05x10 :05x10 :05x10 :05x10    Knee fall out 10x 10x 10x 10x     Glut set    :05x10            Bird dog                        Ther Ex     HEP reivew           Standing lumbar extension        PPU    10x 10x   Bridge :03x10 :03 x15 :03x15    "  Prone hip extension        Standing hip extension knee flexion and straight        Prone glut set neuro :5 15 :05x15 neuro    Prone bilateral knee flexion 10x x15 x15 10x    Heel slide flex        Heel slide abd        Supine ball squeeze 15x 20x 20x :05x10    Supine clamshell Supine red 15x Supine red :05 x20 Supine green 20x Supine red 10x    Prone lay 2 positions up and down 2 positions up and down 10 min 2 positions up and down 10 min 2 positions up and down 15 min    Hip flexor stretch P! In R hip after 3 sets x:15    6\" :15x4 B/L   Standing hip add stretch   *:15x4 R     Ther Activity        Side steps        Tandem walk        Step ups        Sit to stand        Gait Training                        Modalities        Lumbar mechanical traction                  Access Code: [email or web address]  URL: [email or web address]  Date: 01/31/2024  Prepared by: Genia Murrell    Exercises  - Supine Bridge  - 3 x daily - 7 x weekly - 1 sets - 10 reps - 5 sec hold  - Hip Flexor Stretch on Step  - 3 x daily - 7 x weekly - 1 sets - 4 reps - 15 sec hold  - Standing Lumbar Extension  - 6 x daily - 7 x weekly - 1 sets - 10 reps  - Tandem Walking with Counter Support  - 2 x daily - 7 x weekly - 1 sets - 4 reps  - Side Stepping with Counter Support  - 2 x daily - 7 x weekly - 1 sets - 4 reps  - Hooklying Clamshell with Resistance  - 2 x daily - 7 x weekly - 1 sets - 10 reps - 5 sec hold  - Supine Hip Adduction Isometric with Ball  - 2 x daily - 7 x weekly - 1 sets - 10 reps - 5 sec hold  - Supine Transversus Abdominis Bracing - Hands on Stomach  - 2 x daily - 7 x weekly - 1 sets - 10 reps - 5 sec hold  - Bent Knee Fallouts  - 2 x daily - 7 x weekly - 1 sets - 10 reps  - Prone Gluteal Sets  - 2 x daily - 7 x weekly - 1 sets - 10 reps - 5 sec hold  - Prone Press Up  - 2 x daily - 7 x weekly - 1 sets - 10 reps           "

## 2024-03-19 DIAGNOSIS — Z78.0 ASYMPTOMATIC MENOPAUSAL STATE: Primary | ICD-10-CM

## 2024-03-25 ENCOUNTER — EVALUATION (OUTPATIENT)
Dept: PHYSICAL THERAPY | Facility: CLINIC | Age: 64
End: 2024-03-25
Payer: COMMERCIAL

## 2024-03-25 DIAGNOSIS — G89.29 CHRONIC RIGHT-SIDED LOW BACK PAIN WITHOUT SCIATICA: ICD-10-CM

## 2024-03-25 DIAGNOSIS — M25.551 RIGHT HIP PAIN: Primary | ICD-10-CM

## 2024-03-25 DIAGNOSIS — M54.16 LUMBAR RADICULOPATHY: ICD-10-CM

## 2024-03-25 DIAGNOSIS — M16.11 PRIMARY OSTEOARTHRITIS OF ONE HIP, RIGHT: ICD-10-CM

## 2024-03-25 DIAGNOSIS — M54.50 CHRONIC RIGHT-SIDED LOW BACK PAIN WITHOUT SCIATICA: ICD-10-CM

## 2024-03-25 PROCEDURE — 97110 THERAPEUTIC EXERCISES: CPT | Performed by: PHYSICAL THERAPIST
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
## 2024-03-26 NOTE — DISCHARGE INSTRUCTIONS
Colonoscopy   WHAT YOU NEED TO KNOW:   A colonoscopy is a procedure to examine the inside of your colon (intestine) with a scope. Polyps or tissue growths may have been removed during your colonoscopy. It is normal to feel bloated and to have some abdominal discomfort. You should be passing gas. If you have hemorrhoids or you had polyps removed, you may have a small amount of bleeding. DISCHARGE INSTRUCTIONS:   Seek care immediately if:   You have sudden, severe abdominal pain. You have problems swallowing. You have a large amount of black, sticky bowel movements or blood in your bowel movements. You have sudden trouble breathing. You feel weak, lightheaded, or faint or your heart beats faster than normal for you. Contact your healthcare provider if:   You have a fever and chills. You have nausea or are vomiting. Your abdomen is bloated or feels full and hard. You have abdominal pain. You have black, sticky bowel movements or blood in your bowel movements. You have not had a bowel movement for 3 days after your procedure. You have rash or hives. You have questions or concerns about your procedure. Activity:   Do not lift, strain, or run for 24 hours after your procedure. Rest after your procedure. You have been given medicine to relax you. Do not drive or make important decisions until the day after your procedure. Return to your normal activity as directed. Relieve gas and discomfort from bloating by lying on your right side with a heating pad on your abdomen. You may need to take short walks to help the gas move out. Eat small meals until bloating is relieved. Follow up with your healthcare provider as directed: Write down your questions so you remember to ask them during your visits. If you take a “blood thinner”, please review the specific instructions from your endoscopist about when you should resume it.  These can be found in the “Recommendation” Refill refused.  Per telephone encounter 2/7/2024 patient declines to schedule appointment with Urology.    and “Your Medication list” sections of this After Visit Summary.

## 2024-03-27 ENCOUNTER — OFFICE VISIT (OUTPATIENT)
Dept: OBGYN CLINIC | Facility: CLINIC | Age: 64
End: 2024-03-27
Payer: COMMERCIAL

## 2024-03-27 VITALS
HEIGHT: 63 IN | DIASTOLIC BLOOD PRESSURE: 90 MMHG | WEIGHT: 202 LBS | BODY MASS INDEX: 35.79 KG/M2 | TEMPERATURE: 98.2 F | SYSTOLIC BLOOD PRESSURE: 138 MMHG | HEART RATE: 68 BPM

## 2024-03-27 DIAGNOSIS — M54.50 CHRONIC RIGHT-SIDED LOW BACK PAIN WITHOUT SCIATICA: Primary | ICD-10-CM

## 2024-03-27 DIAGNOSIS — M54.16 LUMBAR RADICULOPATHY: ICD-10-CM

## 2024-03-27 DIAGNOSIS — G89.29 CHRONIC RIGHT-SIDED LOW BACK PAIN WITHOUT SCIATICA: Primary | ICD-10-CM

## 2024-03-27 DIAGNOSIS — M16.11 PRIMARY OSTEOARTHRITIS OF ONE HIP, RIGHT: ICD-10-CM

## 2024-03-27 PROCEDURE — 99214 OFFICE O/P EST MOD 30 MIN: CPT | Performed by: FAMILY MEDICINE

## 2024-03-27 NOTE — PROGRESS NOTES
"[facility] [place] [address]  [place] [id number] [phone]      Assessment:  1. Chronic right-sided low back pain without sciatica  -     MRI lumbar spine without contrast; Future; Expected date: 03/27/2024    2. Lumbar radiculopathy  -     MRI lumbar spine without contrast; Future; Expected date: 03/27/2024    3. Primary osteoarthritis of one hip, right        Plan:  Patient Instructions   F/u after Mri  MRI L spine-  Lower back pain/radiculopath/XR neg/6 wks of PT  Continue home exercises  Icing/heat/OTC pain meds as needed.     Return for f/u after MRi L spine, Recheck.    Chief Complaint:  Chief Complaint   Patient presents with    Right Hip - Follow-up, Pain       Subjective:   HPI    Patient ID: Norma Mercedes is a 63 y.o. female     Here for f/u R hip OA and lower back pain/radiculopathy    1)  R hip OA  She is a little better  Finished PT  Intermittent pain/worse at end of week  Worse lifting leg/pain walking- worse at end of week.  Sharp pain at time/burning/pinching pain/tearing pain  Meloxicam- PRN  Tylenol PRN     2)  lower back pain/radiculopathy  She is better than before  Prednisone helped  Finished PT- discharged  Doing home exercises.  Tearing pain has resolved.  Worse with twisting/lifting/moving in bed- pinching pain  no radiation of pain down leg, but into R groin  n/t L foot       Review of Systems   Constitutional:  Negative for fatigue and fever.   Respiratory:  Negative for shortness of breath.    Cardiovascular:  Negative for chest pain.   Gastrointestinal:  Negative for abdominal pain and nausea.   Genitourinary:  Negative for dysuria.   Musculoskeletal:  Positive for arthralgias.   Skin:  Negative for rash and wound.   Neurological:  Negative for weakness and headaches.       Objective:  Vitals:  /90   Pulse 68   Temp 98.2 °F (36.8 °C)   Ht 5' 3\" (1.6 m)   Wt 91.6 kg (202 lb)   LMP  (LMP Unknown)   BMI 35.78 kg/m²     The " following portions of the patient's history were reviewed and updated as appropriate: allergies, current medications, past family history, past medical history, past social history, past surgical history, and problem list.    Physical exam:  Physical Exam  Constitutional:       Appearance: Normal appearance. She is normal weight.   HENT:      Head: Normocephalic.   Eyes:      Extraocular Movements: Extraocular movements intact.   Pulmonary:      Effort: Pulmonary effort is normal.   Musculoskeletal:      Cervical back: Normal range of motion.   Skin:     General: Skin is warm and dry.   Neurological:      General: No focal deficit present.      Mental Status: She is alert and oriented to person, place, and time. Mental status is at baseline.   Psychiatric:         Mood and Affect: Mood normal.         Behavior: Behavior normal.         Thought Content: Thought content normal.         Judgment: Judgment normal.       Back Exam     Tenderness   The patient is experiencing tenderness in the lumbar and sacroiliac.    Range of Motion   The patient has normal back ROM.    Muscle Strength   The patient has normal back strength.    Reflexes   Patellar:  normal    Other   Toe walk: normal  Heel walk: normal  Gait: antalgic     Comments:  Pain with ext/lat flex L              Semaj Velazquez MD

## 2024-03-27 NOTE — PATIENT INSTRUCTIONS
F/u after Mri  MRI L spine-  Lower back pain/radiculopath/XR neg/6 wks of PT  Continue home exercises  Icing/heat/OTC pain meds as needed.

## 2024-04-02 ENCOUNTER — OFFICE VISIT (OUTPATIENT)
Dept: FAMILY MEDICINE CLINIC | Facility: CLINIC | Age: 64
End: 2024-04-02
Payer: COMMERCIAL

## 2024-04-02 VITALS
BODY MASS INDEX: 35.33 KG/M2 | WEIGHT: 199.4 LBS | TEMPERATURE: 97.5 F | OXYGEN SATURATION: 99 % | DIASTOLIC BLOOD PRESSURE: 98 MMHG | HEART RATE: 70 BPM | HEIGHT: 63 IN | SYSTOLIC BLOOD PRESSURE: 140 MMHG

## 2024-04-02 DIAGNOSIS — I10 ESSENTIAL HYPERTENSION: ICD-10-CM

## 2024-04-02 DIAGNOSIS — I10 PRIMARY HYPERTENSION: Primary | ICD-10-CM

## 2024-04-02 DIAGNOSIS — M25.432 SWELLING OF LEFT WRIST: ICD-10-CM

## 2024-04-02 PROCEDURE — 99213 OFFICE O/P EST LOW 20 MIN: CPT | Performed by: FAMILY MEDICINE

## 2024-04-02 RX ORDER — HYDROCHLOROTHIAZIDE 12.5 MG/1
12.5 TABLET ORAL DAILY
Qty: 90 TABLET | Refills: 3 | Status: SHIPPED | OUTPATIENT
Start: 2024-04-02

## 2024-04-02 RX ORDER — AMLODIPINE BESYLATE 2.5 MG/1
2.5 TABLET ORAL DAILY
Qty: 30 TABLET | Refills: 0 | Status: SHIPPED | OUTPATIENT
Start: 2024-04-02

## 2024-04-02 NOTE — PROGRESS NOTES
Family Medicine Follow-Up Office Visit  Norma Mercedes 63 y.o. female   MRN: [id number] : 1960  ENCOUNTER: 2024       Assessment and Plan   1. Primary hypertension  Assessment & Plan:  Vitals:    24 0940   BP: 140/98   Pulse: 70   Temp: 97.5 °F (36.4 °C)   SpO2: 99%      Repeat /90    Hypertension significantly improved but not yet at goal at today's visit.     Plan:    Continue losartan 100 mg daily   Decreased HCTZ from 25 back to 12.5 due to signs and symptoms of dehydration with associated hypochloremia  Start amlodipine 2.5 mg daily  Repeat BMP in 1 month for further evaluation  Follow up in 3 months for annual physical or sooner as needed      2. Swelling of left wrist  Assessment & Plan:  Recommend ultrasound of the left wrist for further evaluation.     Orders:  -      [employer] limited; Future; Expected date: 2024    3. Essential hypertension  -     hydroCHLOROthiazide 12.5 mg tablet; Take 1 tablet (12.5 mg total) by mouth daily  -     amLODIPine (NORVASC) 2.5 mg tablet; Take 1 tablet (2.5 mg total) by mouth daily  -     Basic metabolic panel; Future; Expected date: 2024          Depression Screening and Follow-up Plan: Patient was screened for depression during today's encounter. They screened negative with a PHQ-2 score of 0.        Chief Complaint     Chief Complaint   Patient presents with   • Medication Management       History of Present Illness   Norma Mercedes is a 63 y.o.-year-old female with PMHx of hypothyroidism, HTN, breast CA, osteopenia who presents today for follow up regarding hypertension.    Patient notes that she has been feeling dehydrated since she started on the increased dosage of HCTZ. Notes that she did have an episode of memory difficulty since starting the increased dosage. She has been hydrating with more Gatorade since then but notes that she works in a hot environment in the summer and is not sure that she will be able to keep up with  "hydration.      Review of Systems   Review of Systems   Constitutional:  Negative for chills and fever.   Respiratory:  Negative for shortness of breath.    Cardiovascular:  Negative for chest pain.   Musculoskeletal:  Positive for arthralgias and myalgias. Negative for back pain and neck pain.   Psychiatric/Behavioral:  Positive for confusion.        Active Problem List     Patient Active Problem List   Diagnosis   • Benign meningioma (HCC)   • Hypertension   • Hypothyroidism   • Thickened endometrium   • Closed fracture of manubrium   • Lymphangitis   • Hyponatremia   • Leukocytosis   • Malignant neoplasm of lower-outer quadrant of right breast of female, estrogen receptor positive (HCC)   • Family history of breast cancer in mother   • Vertigo   • BRCA negative   • Use of anastrozole   • Tendinopathy of right biceps tendon   • Osteopenia of neck of right femur   • Swelling of left wrist       Past Medical History, Past Surgical History, Family History, and Social History were reviewed and updated today as appropriate.    Objective   /98   Pulse 70   Temp 97.5 °F (36.4 °C)   Ht 5' 3\" (1.6 m)   Wt 90.4 kg (199 lb 6.4 oz)   LMP  (LMP Unknown)   SpO2 99%   BMI 35.32 kg/m²     Physical Exam  Constitutional:       Appearance: She is well-developed.      Comments: Repeat 148/90   HENT:      Head: Normocephalic and atraumatic.      Right Ear: External ear normal.      Left Ear: External ear normal.   Eyes:      General: No scleral icterus.     Conjunctiva/sclera: Conjunctivae normal.   Cardiovascular:      Rate and Rhythm: Normal rate and regular rhythm.      Heart sounds: No murmur heard.     No friction rub. No gallop.   Pulmonary:      Effort: Pulmonary effort is normal.      Breath sounds: Normal breath sounds. No wheezing, rhonchi or rales.   Abdominal:      General: Bowel sounds are normal.   Musculoskeletal:      Right lower leg: No edema.      Left lower leg: No edema.      Comments: L wrist > R " "wrist swelling noted   Skin:     General: Skin is warm and dry.   Neurological:      General: No focal deficit present.      Mental Status: She is alert and oriented to person, place, and time.   Psychiatric:         Mood and Affect: Mood normal.         Behavior: Behavior normal.         Pertinent Laboratory/Diagnostic Studies:  Lab Results   Component Value Date    GLUCOSE 128 06/19/2014    BUN 19 03/07/2024    CREATININE 0.87 03/07/2024    CALCIUM 9.6 03/07/2024     06/19/2014    K 3.7 03/07/2024    CO2 35 (H) 03/07/2024    CL 94 (L) 03/07/2024     Lab Results   Component Value Date    ALT 34 03/07/2024    AST 21 03/07/2024    ALKPHOS 63 03/07/2024       Lab Results   Component Value Date    WBC 6.08 03/07/2024    HGB 14.3 03/07/2024    HCT 41.8 03/07/2024    MCV 84 03/07/2024     03/07/2024       Lab Results   Component Value Date    TSH 1.91 04/16/2020       No results found for: \"CHOL\"  Lab Results   Component Value Date    TRIG 235 (H) 03/07/2024     Lab Results   Component Value Date    HDL 64 03/07/2024     Lab Results   Component Value Date    LDLCALC 126 (H) 03/07/2024     Lab Results   Component Value Date    HGBA1C 5.5 11/12/2019       Results for orders placed or performed in visit on 03/08/24   HPV High Risk    Specimen: Thin-Prep Vial   Result Value Ref Range    HPV Other HR Negative Negative    HPV16 Negative Negative    HPV18 Negative Negative   Liquid-based pap, screening   Result Value Ref Range    Case Report       Gynecologic Cytology Report                       Case: [id number]                                  Authorizing Provider:  Jaye Peterson CNM             Collected:           03/08/2024 1045              Ordering Location:     [facility]      Received:            03/08/2024 1045                                     [facility]                                                         First Screen:          Dain Santana                                          "                        Specimen:    LIQUID-BASED PAP, SCREENING, Cervix, Endocervical                                          Primary Interpretation Negative for intraepithelial lesion or malignancy     Specimen Adequacy       Satisfactory for evaluation. Endocervical/transformation zone component present.    Additional Information       [employer]'s FDA approved ,  and ThinPrep Imaging Duo System are utilized with strict adherence to the 's instruction manual to prepare gynecologic and non-gynecologic cytology specimens for the production of ThinPrep slides as well as for gynecologic ThinPrep imaging. These processes have been validated by our laboratory and/or by the .  The Pap test is not a diagnostic procedure and should not be used as the sole means to detect cervical cancer. It is only a screening procedure to aid in the detection of cervical cancer and its precursors. Both false-negative and false-positive results have been experienced. Your patient's test result should be interpreted in this context together with the history and clinical findings.         Orders Placed This Encounter   Procedures   • US MSK limited   • Basic metabolic panel           Current Medications     Current Outpatient Medications   Medication Sig Dispense Refill   • acetaminophen (TYLENOL) 650 mg CR tablet Take 1 tablet (650 mg total) by mouth every 8 (eight) hours as needed for mild pain 30 tablet 0   • albuterol (Ventolin HFA) 90 mcg/act inhaler Inhale 2 puffs every 6 (six) hours as needed for wheezing 18 g 5   • amLODIPine (NORVASC) 2.5 mg tablet Take 1 tablet (2.5 mg total) by mouth daily 30 tablet 0   • anastrozole (ARIMIDEX) 1 mg tablet TAKE 1 TABLET DAILY 90 tablet 3   • Calcium 200 MG TABS Take by mouth daily     • Cholecalciferol (VITAMIN D3) 2000 units capsule Take 2,000 Units by mouth daily     • fexofenadine (ALLEGRA) 180 MG tablet Take 180 mg by mouth daily in the early morning     •  fluticasone (FLONASE) 50 mcg/act nasal spray 1 spray into each nostril if needed     • hydroCHLOROthiazide 12.5 mg tablet Take 1 tablet (12.5 mg total) by mouth daily 90 tablet 3   • levothyroxine 50 mcg tablet TAKE 1 TABLET DAILY 90 tablet 3   • losartan (COZAAR) 100 MG tablet Take 1 tablet (100 mg total) by mouth every morning 90 tablet 3   • Magnesium 500 MG TABS Take by mouth daily     • rizatriptan (MAXALT-MLT) 10 mg disintegrating tablet TAKE ONE TABLET AT ONSET OF HEADACHE. MAY REPEAT ONCE IN 2 HRS AS NEEDED. 27 tablet 0   • Fexofenadine HCl (ALLEGRA ALLERGY PO) Take by mouth (Patient not taking: Reported on 4/2/2024)     • ketorolac (TORADOL) 10 mg tablet Take 1 tablet (10 mg total) by mouth every 6 (six) hours as needed for moderate pain for up to 5 days (Patient not taking: Reported on 1/16/2024) 20 tablet 0   • meloxicam (MOBIC) 15 mg tablet Take 1 tablet (15 mg total) by mouth daily (Patient not taking: Reported on 4/2/2024) 30 tablet 0   • ondansetron (ZOFRAN) 4 mg tablet Take 4 mg by mouth every 8 (eight) hours as needed (Patient not taking: Reported on 4/2/2024)     • predniSONE 10 mg tablet Take 7T PO x 1 day, then take 6T PO x 1 day, and continue to decrease  by 1T until finished. Quantity-28 (Patient not taking: Reported on 4/2/2024) 28 tablet 0     Current Facility-Administered Medications   Medication Dose Route Frequency Provider Last Rate Last Admin   • diphenhydrAMINE (BENADRYL) injection 50 mg  50 mg Intramuscular Q6H PRN Meredith Slade PA-C   50 mg at 02/02/18 1440       ALLERGIES:  Allergies   Allergen Reactions   • Augmentin [Amoxicillin-Pot Clavulanate] Hives and GI Intolerance   • Oxycodone Itching       Health Maintenance     Health Maintenance   Topic Date Due   • HIV Screening  Never done   • Zoster Vaccine (1 of 2) Never done   • Annual Physical  03/12/2019   • Breast Cancer Survivorship Visit  Never done   • Influenza Vaccine (1) 09/01/2023   • COVID-19 Vaccine (4 - 2023-24 season)  09/01/2023   • Breast Cancer Screening: Mammogram  03/07/2024   • Depression Screening  04/02/2025   • Cervical Cancer Screening  03/08/2029   • Colorectal Cancer Screening  09/11/2030   • DTaP,Tdap,and Td Vaccines (2 - Td or Tdap) 11/04/2030   • Hepatitis C Screening  Completed   • Osteoporosis Screening  Completed   • Pneumococcal Vaccine: Pediatrics (0 to 5 Years) and At-Risk Patients (6 to 64 Years)  Aged Out   • HIB Vaccine  Aged Out   • IPV Vaccine  Aged Out   • Hepatitis A Vaccine  Aged Out   • Meningococcal ACWY Vaccine  Aged Out   • HPV Vaccine  Aged Out     Immunization History   Administered Date(s) Administered   • COVID-19 MODERNA VACC 0.5 ML IM 02/05/2021, 03/03/2021, 12/17/2021   • INFLUENZA 11/14/2015, 10/02/2017   • Influenza, injectable, quadrivalent, preservative free 0.5 mL 11/14/2019   • Influenza, recombinant, quadrivalent,injectable, preservative free 10/21/2020   • Influenza, seasonal, injectable 10/02/2012, 11/14/2015   • Tdap 11/04/2020         Leticia Houston DO   [facility]  4/3/2024  4:37 PM    Parts of this note were dictated using Dragon dictation software and may have sounds-like errors due to variation in pronunciation.

## 2024-04-03 ENCOUNTER — HOSPITAL ENCOUNTER (OUTPATIENT)
Dept: MRI IMAGING | Facility: HOSPITAL | Age: 64
Discharge: HOME | End: 2024-04-03
Attending: FAMILY MEDICINE
Payer: COMMERCIAL

## 2024-04-03 DIAGNOSIS — M54.16 LUMBAR RADICULOPATHY: ICD-10-CM

## 2024-04-03 DIAGNOSIS — G89.29 CHRONIC RIGHT-SIDED LOW BACK PAIN WITHOUT SCIATICA: ICD-10-CM

## 2024-04-03 DIAGNOSIS — M54.50 CHRONIC RIGHT-SIDED LOW BACK PAIN WITHOUT SCIATICA: ICD-10-CM

## 2024-04-03 PROBLEM — M25.432 SWELLING OF LEFT WRIST: Status: ACTIVE | Noted: 2024-04-03

## 2024-04-03 PROCEDURE — 72148 MRI LUMBAR SPINE W/O DYE: CPT

## 2024-04-03 NOTE — ASSESSMENT & PLAN NOTE
Vitals:    04/02/24 0940   BP: 140/98   Pulse: 70   Temp: 97.5 °F (36.4 °C)   SpO2: 99%      Repeat /90    Hypertension significantly improved but not yet at goal at today's visit.     Plan:    Continue losartan 100 mg daily   Decreased HCTZ from 25 back to 12.5 due to signs and symptoms of dehydration with associated hypochloremia  Start amlodipine 2.5 mg daily  Repeat BMP in 1 month for further evaluation  Follow up in 3 months for annual physical or sooner as needed

## 2024-04-10 ENCOUNTER — OFFICE VISIT (OUTPATIENT)
Dept: OBGYN CLINIC | Facility: CLINIC | Age: 64
End: 2024-04-10
Payer: COMMERCIAL

## 2024-04-10 VITALS
SYSTOLIC BLOOD PRESSURE: 137 MMHG | WEIGHT: 202.4 LBS | HEIGHT: 63 IN | TEMPERATURE: 98.1 F | DIASTOLIC BLOOD PRESSURE: 91 MMHG | HEART RATE: 80 BPM | BODY MASS INDEX: 35.86 KG/M2

## 2024-04-10 DIAGNOSIS — M54.50 CHRONIC RIGHT-SIDED LOW BACK PAIN WITHOUT SCIATICA: ICD-10-CM

## 2024-04-10 DIAGNOSIS — G89.29 CHRONIC RIGHT-SIDED LOW BACK PAIN WITHOUT SCIATICA: ICD-10-CM

## 2024-04-10 DIAGNOSIS — M54.16 LUMBAR RADICULOPATHY: ICD-10-CM

## 2024-04-10 DIAGNOSIS — M51.36 BULGING LUMBAR DISC: Primary | ICD-10-CM

## 2024-04-10 PROCEDURE — 99214 OFFICE O/P EST MOD 30 MIN: CPT | Performed by: FAMILY MEDICINE

## 2024-04-10 NOTE — PROGRESS NOTES
[facility] [address] [place] [id number] [phone]      Assessment:  1. Bulging lumbar disc  -     Ambulatory referral to Spine & Pain Management; Future    2. Chronic right-sided low back pain without sciatica  -     Ambulatory referral to Spine & Pain Management; Future    3. Lumbar radiculopathy  -     Ambulatory referral to Spine & Pain Management; Future        Plan:  Patient Instructions   F/u here as needed  Referral to pain management.  Icing/heat/OTC pain meds as needed.  Home exercises.     Return for referral to pain management, Recheck.    Chief Complaint:  Chief Complaint   Patient presents with    Lower Back - Follow-up     Follow up to lumbar MRI       Subjective:   HPI    Patient ID: Norma Mercedes is a 63 y.o. female        Here for f/u  lower back pain/radiculopathy/MRI  She is better than before  Finished PT- discharged  Doing home exercises.  Tearing pain has improved  Worse with twisting/lifting/moving in bed- pinching pain  no radiation of pain down leg, but into R groin  n/t L foot    MRI LUMBAR SPINE WITHOUT CONTRAST     INDICATION: M54.16: Radiculopathy, lumbar region  M54.50: Low back pain, unspecified  G89.29: Other chronic pain.     COMPARISON: 1/12/2024     TECHNIQUE:  Multiplanar, multisequence imaging of the lumbar spine was performed. .        IMAGE QUALITY:  Diagnostic     FINDINGS:     VERTEBRAL BODIES:  There are 5 lumbar type vertebral bodies. Trace grade 1 anterolisthesis of L2 on L3. Mild levoscoliosis mid lumbar spine at the L4 level. Type II Modic endplate changes at L3-4. Hemangioma in the T12 vertebra.     SACRUM: Scattered degenerative endplate changes.  No focally suspicious marrow lesions.  No bone marrow edema or compression abnormality.     DISTAL CORD AND CONUS: Small linear T1 hyperintense signal abnormality in the filum terminale extending from the mid L3 level to the sacrum, exemplified on series  8, image 30 indicative of a tiny lipoma of the filum terminale. The conus medullaris   terminates at the L2 level.     PARASPINAL SOFT TISSUES:  Paraspinal soft tissues are unremarkable.     LOWER THORACIC DISC SPACES: Mild noncompressive lower thoracic degenerative change.     LUMBAR DISC SPACES:     L1-L2: There is no focal disk herniation, central canal or neural foraminal narrowing.  Bilateral facet hypertrophy noted.     L2-L3: There is uncovering the intervertebral disc space. There is bilateral facet hypertrophy. There is a small central disc protrusion. Mild central canal narrowing. Neural foramina patent bilaterally.     L3-L4: There is bilateral facet hypertrophy. There is a left paracentral disc protrusion. Moderate central canal narrowing. Neural foramina patent bilaterally.     L4-L5: There is uncovering of the intervertebral disc. There is loss of disc height and signal. There is a disc osteophyte complex with a superimposed left paracentral disc protrusion. Mild central canal narrowing. Moderate narrowing left subarticular   recess and neural foramen. Moderate right neural foraminal narrowing.     L5-S1: There is bilateral facet hypertrophy. There is uncovering the intervertebral disc space. There is a facet cyst on the left, posterior to the neural foramen series 7, image 30. There is a left neural foraminal disc protrusion. Moderate to severe   left neural foraminal narrowing. Central canal patent. Mild to moderate right neural foraminal narrowing.     OTHER FINDINGS:  None.     IMPRESSION:     Multilevel spondylosis, most pronounced at L4-5 and L5-S1        Workstation performed: [id number]       Review of Systems   Constitutional:  Negative for fatigue and fever.   Respiratory:  Negative for shortness of breath.    Cardiovascular:  Negative for chest pain.   Gastrointestinal:  Negative for abdominal pain and nausea.   Genitourinary:  Negative for dysuria.   Musculoskeletal:  Positive for back  "pain.   Skin:  Negative for rash and wound.   Neurological:  Negative for weakness and headaches.       Objective:  Vitals:  /91 (BP Location: Left arm, Patient Position: Sitting, Cuff Size: Large)   Pulse 80   Temp 98.1 °F (36.7 °C) (Tympanic)   Ht 5' 3\" (1.6 m)   Wt 91.8 kg (202 lb 6.4 oz)   LMP  (LMP Unknown)   BMI 35.85 kg/m²     The following portions of the patient's history were reviewed and updated as appropriate: allergies, current medications, past family history, past medical history, past social history, past surgical history, and problem list.    Physical exam:  Physical Exam  Constitutional:       Appearance: Normal appearance. She is normal weight.   HENT:      Head: Normocephalic.   Eyes:      Extraocular Movements: Extraocular movements intact.   Pulmonary:      Effort: Pulmonary effort is normal.   Musculoskeletal:      Cervical back: Normal range of motion.      Lumbar back: Positive right straight leg raise test. Negative left straight leg raise test.   Skin:     General: Skin is warm and dry.   Neurological:      General: No focal deficit present.      Mental Status: She is alert and oriented to person, place, and time. Mental status is at baseline.   Psychiatric:         Mood and Affect: Mood normal.         Behavior: Behavior normal.         Thought Content: Thought content normal.         Judgment: Judgment normal.       Back Exam     Tenderness   The patient is experiencing tenderness in the lumbar.    Range of Motion   The patient has normal back ROM.    Muscle Strength   The patient has normal back strength.    Tests   Straight leg raise right: positive  Straight leg raise left: negative    Reflexes   Patellar:  normal    Comments:  Pain with flex/ext/lat flex B            I have personally reviewed pertinent films in PACS and my interpretation is MRI L spine- L2-S1 bulging disc. DJD..      Semaj Velazquez MD   "

## 2024-04-10 NOTE — PATIENT INSTRUCTIONS
F/u here as needed  Referral to pain management.  Icing/heat/OTC pain meds as needed.  Home exercises.

## 2024-04-12 ENCOUNTER — HOSPITAL ENCOUNTER (OUTPATIENT)
Dept: ULTRASOUND IMAGING | Facility: HOSPITAL | Age: 64
End: 2024-04-12
Attending: FAMILY MEDICINE
Payer: COMMERCIAL

## 2024-04-12 DIAGNOSIS — M25.432 SWELLING OF LEFT WRIST: ICD-10-CM

## 2024-04-12 PROCEDURE — 76882 US LMTD JT/FCL EVL NVASC XTR: CPT

## 2024-04-23 ENCOUNTER — CONSULT (OUTPATIENT)
Age: 64
End: 2024-04-23
Payer: COMMERCIAL

## 2024-04-23 VITALS
DIASTOLIC BLOOD PRESSURE: 87 MMHG | SYSTOLIC BLOOD PRESSURE: 143 MMHG | BODY MASS INDEX: 35.97 KG/M2 | HEART RATE: 71 BPM | WEIGHT: 203 LBS | HEIGHT: 63 IN

## 2024-04-23 DIAGNOSIS — M54.16 LUMBAR RADICULOPATHY: ICD-10-CM

## 2024-04-23 DIAGNOSIS — S73.191D TEAR OF RIGHT ACETABULAR LABRUM, SUBSEQUENT ENCOUNTER: ICD-10-CM

## 2024-04-23 DIAGNOSIS — M54.50 CHRONIC RIGHT-SIDED LOW BACK PAIN WITHOUT SCIATICA: ICD-10-CM

## 2024-04-23 DIAGNOSIS — G89.29 CHRONIC RIGHT-SIDED LOW BACK PAIN WITHOUT SCIATICA: ICD-10-CM

## 2024-04-23 DIAGNOSIS — M51.36 BULGING LUMBAR DISC: ICD-10-CM

## 2024-04-23 DIAGNOSIS — G89.4 CHRONIC PAIN SYNDROME: Primary | ICD-10-CM

## 2024-04-23 PROBLEM — M51.369 BULGING LUMBAR DISC: Status: ACTIVE | Noted: 2024-04-23

## 2024-04-23 PROBLEM — S73.191A TEAR OF RIGHT ACETABULAR LABRUM: Status: ACTIVE | Noted: 2024-04-23

## 2024-04-23 PROCEDURE — 99204 OFFICE O/P NEW MOD 45 MIN: CPT | Performed by: ANESTHESIOLOGY

## 2024-04-23 RX ORDER — DULOXETIN HYDROCHLORIDE 30 MG/1
30 CAPSULE, DELAYED RELEASE ORAL DAILY
Qty: 30 CAPSULE | Refills: 1 | Status: SHIPPED | OUTPATIENT
Start: 2024-04-23 | End: 2024-05-23
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
## 2024-04-24 ENCOUNTER — TELEPHONE (OUTPATIENT)
Age: 64
End: 2024-04-24

## 2024-04-29 DIAGNOSIS — E03.9 HYPOTHYROIDISM, UNSPECIFIED TYPE: ICD-10-CM

## 2024-04-29 DIAGNOSIS — I10 ESSENTIAL HYPERTENSION: ICD-10-CM

## 2024-04-29 RX ORDER — AMLODIPINE BESYLATE 2.5 MG/1
2.5 TABLET ORAL DAILY
Qty: 90 TABLET | Refills: 1 | Status: SHIPPED | OUTPATIENT
Start: 2024-04-29

## 2024-04-29 RX ORDER — LEVOTHYROXINE SODIUM 0.05 MG/1
TABLET ORAL
Qty: 90 TABLET | Refills: 3 | Status: SHIPPED | OUTPATIENT
Start: 2024-04-29

## 2024-05-01 ENCOUNTER — HOSPITAL ENCOUNTER (OUTPATIENT)
Dept: MRI IMAGING | Facility: HOSPITAL | Age: 64
Discharge: HOME | End: 2024-05-01
Payer: COMMERCIAL

## 2024-05-01 DIAGNOSIS — S73.191D TEAR OF RIGHT ACETABULAR LABRUM, SUBSEQUENT ENCOUNTER: ICD-10-CM

## 2024-05-01 PROCEDURE — 73721 MRI JNT OF LWR EXTRE W/O DYE: CPT

## 2024-05-03 ENCOUNTER — TELEPHONE (OUTPATIENT)
Dept: PAIN MEDICINE | Facility: CLINIC | Age: 64
End: 2024-05-03

## 2024-05-03 DIAGNOSIS — M25.551 RIGHT HIP PAIN: ICD-10-CM

## 2024-05-03 DIAGNOSIS — S73.191D TEAR OF RIGHT ACETABULAR LABRUM, SUBSEQUENT ENCOUNTER: Primary | ICD-10-CM

## 2024-05-03 NOTE — TELEPHONE ENCOUNTER
S/W pt and has only seen a hand specialist. Asking that [facility] place a referral for [facility] orthopedic surgery    Also asking of ok to proceed with Lumbar injection scheduled for the end of the month.   Please verify ok

## 2024-05-03 NOTE — TELEPHONE ENCOUNTER
----- Message from Barbara Kocher, CRNP sent at 5/3/2024  2:48 PM EDT -----  Please call patient and let her know that the recent MRI of the right hip reveals a large right acetabular labral tear with 2 paralabral cysts.  Also, there is multi the locular cyst noted in the left hip and probably paralabral cyst associated with a left acetabular tear.  Recommend that she be evaluated by an orthopedic surgeon.  Please find out if she already under the care of an orthopedic surgeon or if I need to refer her to an orthopedic surgeon

## 2024-05-06 NOTE — TELEPHONE ENCOUNTER
Referral placed for patient to see Dr. Santo Hess to keep scheduled appointment for lumbar injection.

## 2024-05-07 ENCOUNTER — HOSPITAL ENCOUNTER (OUTPATIENT)
Dept: MAMMOGRAPHY | Facility: CLINIC | Age: 64
Discharge: HOME | End: 2024-05-07
Payer: COMMERCIAL

## 2024-05-07 ENCOUNTER — HOSPITAL ENCOUNTER (OUTPATIENT)
Dept: ULTRASOUND IMAGING | Facility: CLINIC | Age: 64
Discharge: HOME | End: 2024-05-07
Payer: COMMERCIAL

## 2024-05-07 VITALS — HEIGHT: 63 IN | BODY MASS INDEX: 35.97 KG/M2 | WEIGHT: 203 LBS

## 2024-05-07 DIAGNOSIS — C50.511 MALIGNANT NEOPLASM OF LOWER-OUTER QUADRANT OF RIGHT BREAST OF FEMALE, ESTROGEN RECEPTOR POSITIVE (HCC): ICD-10-CM

## 2024-05-07 DIAGNOSIS — R92.8 ABNORMAL MAMMOGRAM: ICD-10-CM

## 2024-05-07 DIAGNOSIS — Z17.0 MALIGNANT NEOPLASM OF LOWER-OUTER QUADRANT OF RIGHT BREAST OF FEMALE, ESTROGEN RECEPTOR POSITIVE (HCC): ICD-10-CM

## 2024-05-07 PROCEDURE — G0279 TOMOSYNTHESIS, MAMMO: HCPCS

## 2024-05-07 PROCEDURE — 77066 DX MAMMO INCL CAD BI: CPT

## 2024-05-07 PROCEDURE — 76642 ULTRASOUND BREAST LIMITED: CPT

## 2024-05-10 ENCOUNTER — OFFICE VISIT (OUTPATIENT)
Dept: OBGYN CLINIC | Facility: CLINIC | Age: 64
End: 2024-05-10
Payer: COMMERCIAL

## 2024-05-10 VITALS
DIASTOLIC BLOOD PRESSURE: 91 MMHG | WEIGHT: 203 LBS | SYSTOLIC BLOOD PRESSURE: 160 MMHG | HEIGHT: 63 IN | HEART RATE: 72 BPM | BODY MASS INDEX: 35.97 KG/M2

## 2024-05-10 DIAGNOSIS — S73.191D TEAR OF RIGHT ACETABULAR LABRUM, SUBSEQUENT ENCOUNTER: ICD-10-CM

## 2024-05-10 DIAGNOSIS — M25.551 RIGHT HIP PAIN: ICD-10-CM

## 2024-05-10 PROCEDURE — 99203 OFFICE O/P NEW LOW 30 MIN: CPT | Performed by: ORTHOPAEDIC SURGERY
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
## 2024-05-15 DIAGNOSIS — M51.36 BULGING LUMBAR DISC: ICD-10-CM

## 2024-05-15 DIAGNOSIS — G89.29 CHRONIC RIGHT-SIDED LOW BACK PAIN WITHOUT SCIATICA: ICD-10-CM

## 2024-05-15 DIAGNOSIS — M54.50 CHRONIC RIGHT-SIDED LOW BACK PAIN WITHOUT SCIATICA: ICD-10-CM

## 2024-05-15 DIAGNOSIS — M54.16 LUMBAR RADICULOPATHY: ICD-10-CM

## 2024-05-16 DIAGNOSIS — I10 ESSENTIAL HYPERTENSION: ICD-10-CM

## 2024-05-16 RX ORDER — DULOXETIN HYDROCHLORIDE 30 MG/1
30 CAPSULE, DELAYED RELEASE ORAL DAILY
Qty: 90 CAPSULE | Refills: 1 | Status: SHIPPED | OUTPATIENT
Start: 2024-05-16

## 2024-05-17 RX ORDER — AMLODIPINE BESYLATE 2.5 MG/1
2.5 TABLET ORAL DAILY
Qty: 90 TABLET | Refills: 0 | Status: SHIPPED | OUTPATIENT
Start: 2024-05-17

## 2024-05-30 ENCOUNTER — TELEPHONE (OUTPATIENT)
Dept: PAIN MEDICINE | Facility: CLINIC | Age: 64
End: 2024-05-30

## 2024-05-30 ENCOUNTER — OFFICE VISIT (OUTPATIENT)
Age: 64
End: 2024-05-30
Payer: COMMERCIAL

## 2024-05-30 VITALS
BODY MASS INDEX: 35.97 KG/M2 | WEIGHT: 203 LBS | HEART RATE: 74 BPM | SYSTOLIC BLOOD PRESSURE: 134 MMHG | DIASTOLIC BLOOD PRESSURE: 86 MMHG | HEIGHT: 63 IN

## 2024-05-30 DIAGNOSIS — G89.4 CHRONIC PAIN SYNDROME: ICD-10-CM

## 2024-05-30 DIAGNOSIS — S73.191S TEAR OF RIGHT ACETABULAR LABRUM, SEQUELA: ICD-10-CM

## 2024-05-30 DIAGNOSIS — F11.20 UNCOMPLICATED OPIOID DEPENDENCE (HCC): ICD-10-CM

## 2024-05-30 DIAGNOSIS — Z79.891 LONG-TERM CURRENT USE OF OPIATE ANALGESIC: ICD-10-CM

## 2024-05-30 DIAGNOSIS — M54.16 LUMBAR RADICULOPATHY: Primary | ICD-10-CM

## 2024-05-30 PROCEDURE — 99214 OFFICE O/P EST MOD 30 MIN: CPT | Performed by: ANESTHESIOLOGY

## 2024-05-30 NOTE — TELEPHONE ENCOUNTER
Pls discuss w/ pt at OV 5/30/24 today @ 6142. Thank you    Pt daniel for TFMADELIN tomorrow 5/31/23

## 2024-05-30 NOTE — TELEPHONE ENCOUNTER
S/w pt and advised that RM will assess whether or not pt needing to r/s proc for tomorrow at todays f/u. Pt verbalized understanding.
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
## 2024-05-30 NOTE — TELEPHONE ENCOUNTER
Caller: Jana    Doctor: Senait    Reason for call: patient has a cold, cough and sneezing can she still have her procedure on 5/31    Call back#: [phone]
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
## 2024-05-31 ENCOUNTER — HOSPITAL ENCOUNTER (OUTPATIENT)
Dept: RADIOLOGY | Facility: HOSPITAL | Age: 64
End: 2024-05-31
Payer: COMMERCIAL

## 2024-05-31 VITALS
SYSTOLIC BLOOD PRESSURE: 148 MMHG | DIASTOLIC BLOOD PRESSURE: 92 MMHG | RESPIRATION RATE: 20 BRPM | OXYGEN SATURATION: 96 % | HEART RATE: 76 BPM | TEMPERATURE: 97.2 F

## 2024-05-31 DIAGNOSIS — G89.4 CHRONIC PAIN SYNDROME: ICD-10-CM

## 2024-05-31 DIAGNOSIS — M54.16 LUMBAR RADICULOPATHY: ICD-10-CM

## 2024-05-31 PROCEDURE — 64484 NJX AA&/STRD TFRM EPI L/S EA: CPT | Performed by: ANESTHESIOLOGY

## 2024-05-31 PROCEDURE — 64483 NJX AA&/STRD TFRM EPI L/S 1: CPT | Performed by: ANESTHESIOLOGY

## 2024-05-31 RX ORDER — PAPAVERINE HCL 150 MG
15 CAPSULE, EXTENDED RELEASE ORAL ONCE
Status: COMPLETED | OUTPATIENT
Start: 2024-05-31 | End: 2024-05-31

## 2024-05-31 RX ORDER — LIDOCAINE HYDROCHLORIDE 10 MG/ML
5 INJECTION, SOLUTION EPIDURAL; INFILTRATION; INTRACAUDAL; PERINEURAL ONCE
Status: COMPLETED | OUTPATIENT
Start: 2024-05-31 | End: 2024-05-31

## 2024-05-31 RX ADMIN — LIDOCAINE HYDROCHLORIDE 5 ML: 10 INJECTION, SOLUTION EPIDURAL; INFILTRATION; INTRACAUDAL; PERINEURAL at 09:43

## 2024-05-31 RX ADMIN — Medication 15 MG: at 09:47

## 2024-05-31 RX ADMIN — IOHEXOL 2 ML: 300 INJECTION, SOLUTION INTRAVENOUS at 09:47

## 2024-05-31 NOTE — DISCHARGE INSTRUCTIONS
Epidural Steroid Injection   WHAT YOU NEED TO KNOW:   An epidural steroid injection (MADELIN) is a procedure to inject steroid medicine into the epidural space. The epidural space is between your spinal cord and vertebrae. Steroids reduce inflammation and fluid buildup in your spine that may be causing pain. You may be given pain medicine along with the steroids.          ACTIVITY  Do not drive or operate machinery today.  No strenuous activity today - bending, lifting, etc.  You may resume normal activites starting tomorrow - start slowly and as tolerated.  You may shower today, but no tub baths or hot tubs.  You may have numbness for several hours from the local anesthetic. Please use caution and common sense, especially with weight-bearing activities.    CARE OF THE INJECTION SITE  If you have soreness or pain, apply ice to the area today (20 minutes on/20 minutes off).  Starting tomorrow, you may use warm, moist heat or ice if needed.  You may have an increase or change in your discomfort for 36-48 hours after your treatment.  Apply ice and continue with any pain medication you have been prescribed.  Notify the [facility] if you have any of the following: redness, drainage, swelling, headache, stiff neck or fever above 100°F.    SPECIAL INSTRUCTIONS  Our office will contact you in approximately 7 days for a progress report.    MEDICATIONS  Continue to take all routine medications.  Our office may have instructed you to hold some medications.    As no general anesthesia was used in today's procedure, you should not experience any side effects related to anesthesia.     If you are diabetic, the steroids used in today's injection may temporarily increase your blood sugar levels after the first few days after your injection. Please keep a close eye on your sugars and alert the doctor who manages your diabetes if your sugars are significantly high from your baseline or you are symptomatic.     If you have a  problem specifically related to your procedure, please call our office at [phone].  Problems not related to your procedure should be directed to your primary care physician.

## 2024-05-31 NOTE — INTERVAL H&P NOTE
Update: (This section must be completed if the H&P was completed greater than 24 hrs to procedure or admission)    H&P reviewed. After examining the patient, I find no changed to the H&P since it had been written.    Patient re-evaluated. Accept as history and physical.    Ernestine Pickett MD/May 31, 2024/9:31 AM

## 2024-06-01 LAB
6MAM UR QL CFM: NEGATIVE NG/ML
7AMINOCLONAZEPAM UR QL CFM: NEGATIVE NG/ML
A-OH ALPRAZ UR QL CFM: NEGATIVE NG/ML
ACCEPTABLE CREAT UR QL: ABNORMAL MG/DL
ACCEPTIBLE SP GR UR QL: NORMAL
AMPHET UR QL CFM: NEGATIVE NG/ML
AMPHET UR QL CFM: NEGATIVE NG/ML
BUPRENORPHINE UR QL CFM: NEGATIVE NG/ML
BUTALBITAL UR QL CFM: NEGATIVE NG/ML
BZE UR QL CFM: NEGATIVE NG/ML
CODEINE UR QL CFM: NEGATIVE NG/ML
DESIPRAMINE UR QL CFM: NEGATIVE NG/ML
EDDP UR QL CFM: NEGATIVE NG/ML
ETHYL GLUCURONIDE UR QL CFM: NEGATIVE NG/ML
ETHYL SULFATE UR QL SCN: NEGATIVE NG/ML
FENTANYL UR QL CFM: NEGATIVE NG/ML
GLIADIN IGG SER IA-ACNC: NEGATIVE NG/ML
GLUCOSE 30M P 50 G LAC PO SERPL-MCNC: NEGATIVE NG/ML
HYDROCODONE UR QL CFM: NEGATIVE NG/ML
HYDROCODONE UR QL CFM: NEGATIVE NG/ML
HYDROMORPHONE UR QL CFM: NEGATIVE NG/ML
LORAZEPAM UR QL CFM: NEGATIVE NG/ML
MDMA UR QL CFM: NEGATIVE NG/ML
ME-PHENIDATE UR QL CFM: NEGATIVE NG/ML
MEPERIDINE UR QL CFM: NEGATIVE NG/ML
METHADONE UR QL CFM: NEGATIVE NG/ML
METHAMPHET UR QL CFM: NEGATIVE NG/ML
MORPHINE UR QL CFM: NEGATIVE NG/ML
MORPHINE UR QL CFM: NEGATIVE NG/ML
NITRITE UR QL: NORMAL UG/ML
NORBUPRENORPHINE UR QL CFM: NEGATIVE NG/ML
NORDIAZEPAM UR QL CFM: NEGATIVE NG/ML
NORFENTANYL UR QL CFM: NEGATIVE NG/ML
NORHYDROCODONE UR QL CFM: NEGATIVE NG/ML
NORHYDROCODONE UR QL CFM: NEGATIVE NG/ML
NORMEPERIDINE UR QL CFM: NEGATIVE NG/ML
NOROXYCODONE UR QL CFM: NEGATIVE NG/ML
OLANZAPINE QUANTIFICATION: NEGATIVE NG/ML
OPC-3373 QUANTIFICATION: NEGATIVE NG/ML
OXAZEPAM UR QL CFM: NEGATIVE NG/ML
OXYCODONE UR QL CFM: NEGATIVE NG/ML
OXYMORPHONE UR QL CFM: NEGATIVE NG/ML
OXYMORPHONE UR QL CFM: NEGATIVE NG/ML
PARA-FLUOROFENTANYL QUANTIFICATION: NORMAL NG/ML
PCP UR QL CFM: NEGATIVE NG/ML
PHENOBARB UR QL CFM: NEGATIVE NG/ML
RESULT ALL_PRESCRIBED MEDS AND SPECIAL INSTRUCTIONS: NORMAL
SECOBARBITAL UR QL CFM: NEGATIVE NG/ML
SL AMB 4-ANPP QUANTIFICATION: NORMAL NG/ML
SL AMB 7-OH-MITRAGYNINE (KRATOM ALKALOID) QUANTIFICATION: NEGATIVE NG/ML
SL AMB ACETYL FENTANYL QUANTIFICATION: NORMAL NG/ML
SL AMB ACETYL NORFENTANYL QUANTIFICATION: NORMAL NG/ML
SL AMB ACRYL FENTANYL QUANTIFICATION: NORMAL NG/ML
SL AMB CARFENTANIL QUANTIFICATION: NORMAL NG/ML
SL AMB CLOZAPINE QUANTIFICATION: NEGATIVE NG/ML
SL AMB CTHC (MARIJUANA METABOLITE) QUANTIFICATION: NEGATIVE NG/ML
SL AMB DEXTRORPHAN (DEXTROMETHORPHAN METABOLITE) QUANT: ABNORMAL NG/ML
SL AMB HALOPERIDOL  QUANTIFICATION: NEGATIVE NG/ML
SL AMB HALOPERIDOL METABOLITE QUANTIFICATION: NEGATIVE NG/ML
SL AMB HYDROXYRISPERIDONE QUANTIFICATION: NEGATIVE NG/ML
SL AMB N-DESMETHYL-TRAMADOL QUANTIFICATION: NEGATIVE NG/ML
SL AMB N-DESMETHYLCLOZAPINE QUANTIFICATION: NEGATIVE NG/ML
SL AMB NORQUETIAPINE QUANTIFICATION: NEGATIVE NG/ML
SL AMB PHENTERMINE QUANTIFICATION: NEGATIVE NG/ML
SL AMB PREGABALIN QUANTIFICATION: NEGATIVE NG/ML
SL AMB QUETIAPINE QUANTIFICATION: NEGATIVE NG/ML
SL AMB RISPERIDONE QUANTIFICATION: NEGATIVE NG/ML
SL AMB RITALINIC ACID QUANTIFICATION: NEGATIVE NG/ML
SPECIMEN PH ACCEPTABLE UR: NORMAL
TAPENTADOL UR QL CFM: NEGATIVE NG/ML
TEMAZEPAM UR QL CFM: NEGATIVE NG/ML
TEMAZEPAM UR QL CFM: NEGATIVE NG/ML
TRAMADOL UR QL CFM: NEGATIVE NG/ML
URATE/CREAT 24H UR: NEGATIVE NG/ML

## 2024-06-07 ENCOUNTER — TELEPHONE (OUTPATIENT)
Dept: RADIOLOGY | Facility: MEDICAL CENTER | Age: 64
End: 2024-06-07

## 2024-06-11 ENCOUNTER — OFFICE VISIT (OUTPATIENT)
Dept: FAMILY MEDICINE CLINIC | Facility: CLINIC | Age: 64
End: 2024-06-11
Payer: COMMERCIAL

## 2024-06-11 VITALS
HEIGHT: 63 IN | HEART RATE: 70 BPM | SYSTOLIC BLOOD PRESSURE: 144 MMHG | BODY MASS INDEX: 35.51 KG/M2 | TEMPERATURE: 97.1 F | DIASTOLIC BLOOD PRESSURE: 88 MMHG | OXYGEN SATURATION: 98 % | WEIGHT: 200.4 LBS

## 2024-06-11 DIAGNOSIS — I10 PRIMARY HYPERTENSION: ICD-10-CM

## 2024-06-11 DIAGNOSIS — N39.0 URINARY TRACT INFECTION WITHOUT HEMATURIA, SITE UNSPECIFIED: Primary | ICD-10-CM

## 2024-06-11 DIAGNOSIS — I10 ESSENTIAL HYPERTENSION: ICD-10-CM

## 2024-06-11 DIAGNOSIS — G89.4 CHRONIC PAIN SYNDROME: ICD-10-CM

## 2024-06-11 DIAGNOSIS — R35.0 URINARY FREQUENCY: ICD-10-CM

## 2024-06-11 PROCEDURE — 81002 URINALYSIS NONAUTO W/O SCOPE: CPT | Performed by: FAMILY MEDICINE

## 2024-06-11 PROCEDURE — 99214 OFFICE O/P EST MOD 30 MIN: CPT | Performed by: FAMILY MEDICINE

## 2024-06-11 PROCEDURE — 87147 CULTURE TYPE IMMUNOLOGIC: CPT | Performed by: FAMILY MEDICINE

## 2024-06-11 PROCEDURE — 87077 CULTURE AEROBIC IDENTIFY: CPT | Performed by: FAMILY MEDICINE

## 2024-06-11 PROCEDURE — 87086 URINE CULTURE/COLONY COUNT: CPT | Performed by: FAMILY MEDICINE

## 2024-06-11 PROCEDURE — 87186 SC STD MICRODIL/AGAR DIL: CPT | Performed by: FAMILY MEDICINE

## 2024-06-11 RX ORDER — AMLODIPINE BESYLATE 2.5 MG/1
2.5 TABLET ORAL DAILY
Qty: 90 TABLET | Refills: 0 | Status: SHIPPED | OUTPATIENT
Start: 2024-06-11

## 2024-06-11 RX ORDER — LEVOFLOXACIN 750 MG/1
750 TABLET, FILM COATED ORAL EVERY 24 HOURS
Qty: 5 TABLET | Refills: 0 | Status: SHIPPED | OUTPATIENT
Start: 2024-06-11 | End: 2024-06-16

## 2024-06-11 NOTE — ASSESSMENT & PLAN NOTE
Patient with signs and symptoms consistent with UTI with urinary frequency, urgency.  Urine dip positive for nitrites and leukocyte esterase.  Would treat to cover for complicated UTI given urinary frequency and urgency.    Plan:  Send sample for urine culture  Recommend empiric treatment with 5-day course of Levaquin  Recommend increased rest and hydration  Follow up in 1-2 weeks or sooner as needed should symptoms persist or worsen

## 2024-06-11 NOTE — ASSESSMENT & PLAN NOTE
Patient note discussed that difficulties with chronic pain have been exacerbated by treatment with Arimidex.    Discussed possible treatment options including Cymbalta however patient is hesitant regarding this.    Plan to discuss further at follow-up or sooner as needed.

## 2024-06-11 NOTE — ASSESSMENT & PLAN NOTE
Vitals:    06/11/24 1053   BP: 144/88   Pulse: 70   Temp: (!) 97.1 °F (36.2 °C)   SpO2: 98%     Plan:    Continue losartan 100 mg daily   Continue HCTZ 12.5 mg daily  Continue amlodipine 2.5 mg daily  Recommend repeat blood pressure following resolution of illness at follow-up for annual physical in 1 month
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
## 2024-06-13 LAB
BACTERIA UR CULT: ABNORMAL
BACTERIA UR CULT: ABNORMAL

## 2024-06-27 ENCOUNTER — RA CDI HCC (OUTPATIENT)
Dept: OTHER | Facility: HOSPITAL | Age: 64
End: 2024-06-27

## 2024-07-11 PROBLEM — N39.0 URINARY TRACT INFECTION WITHOUT HEMATURIA: Status: RESOLVED | Noted: 2024-06-11 | Resolved: 2024-07-11

## 2024-08-05 ENCOUNTER — OFFICE VISIT (OUTPATIENT)
Dept: SURGICAL ONCOLOGY | Facility: CLINIC | Age: 64
End: 2024-08-05
Payer: COMMERCIAL

## 2024-08-05 VITALS
HEIGHT: 63 IN | BODY MASS INDEX: 34.55 KG/M2 | OXYGEN SATURATION: 97 % | SYSTOLIC BLOOD PRESSURE: 122 MMHG | RESPIRATION RATE: 18 BRPM | WEIGHT: 195 LBS | DIASTOLIC BLOOD PRESSURE: 86 MMHG | HEART RATE: 69 BPM | TEMPERATURE: 97.8 F

## 2024-08-05 DIAGNOSIS — C50.511 MALIGNANT NEOPLASM OF LOWER-OUTER QUADRANT OF RIGHT BREAST OF FEMALE, ESTROGEN RECEPTOR POSITIVE (HCC): Primary | ICD-10-CM

## 2024-08-05 DIAGNOSIS — Z79.811 USE OF ANASTROZOLE: ICD-10-CM

## 2024-08-05 DIAGNOSIS — Z17.0 MALIGNANT NEOPLASM OF LOWER-OUTER QUADRANT OF RIGHT BREAST OF FEMALE, ESTROGEN RECEPTOR POSITIVE (HCC): Primary | ICD-10-CM

## 2024-08-05 PROCEDURE — 99213 OFFICE O/P EST LOW 20 MIN: CPT | Performed by: NURSE PRACTITIONER
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
## 2024-09-16 DIAGNOSIS — I10 ESSENTIAL HYPERTENSION: ICD-10-CM

## 2024-09-17 RX ORDER — AMLODIPINE BESYLATE 2.5 MG/1
2.5 TABLET ORAL DAILY
Qty: 90 TABLET | Refills: 1 | Status: SHIPPED | OUTPATIENT
Start: 2024-09-17

## 2024-10-18 ENCOUNTER — APPOINTMENT (EMERGENCY)
Dept: CT IMAGING | Facility: HOSPITAL | Age: 64
End: 2024-10-18
Payer: COMMERCIAL

## 2024-10-18 ENCOUNTER — HOSPITAL ENCOUNTER (EMERGENCY)
Facility: HOSPITAL | Age: 64
Discharge: HOME | End: 2024-10-18
Attending: EMERGENCY MEDICINE
Payer: COMMERCIAL

## 2024-10-18 VITALS
SYSTOLIC BLOOD PRESSURE: 206 MMHG | HEART RATE: 74 BPM | TEMPERATURE: 97.6 F | RESPIRATION RATE: 18 BRPM | DIASTOLIC BLOOD PRESSURE: 95 MMHG | OXYGEN SATURATION: 100 %

## 2024-10-18 DIAGNOSIS — K57.92 DIVERTICULITIS: Primary | ICD-10-CM

## 2024-10-18 LAB
ALBUMIN SERPL BCG-MCNC: 4.2 G/DL (ref 3.5–5)
ALP SERPL-CCNC: 63 U/L (ref 34–104)
ALT SERPL W P-5'-P-CCNC: 94 U/L (ref 7–52)
ANION GAP SERPL CALCULATED.3IONS-SCNC: 6 MMOL/L (ref 4–13)
AST SERPL W P-5'-P-CCNC: 50 U/L (ref 13–39)
BASOPHILS # BLD AUTO: 0.07 THOUSANDS/ΜL (ref 0–0.1)
BASOPHILS NFR BLD AUTO: 1 % (ref 0–1)
BILIRUB SERPL-MCNC: 0.61 MG/DL (ref 0.2–1)
BILIRUB UR QL STRIP: NEGATIVE
BUN SERPL-MCNC: 11 MG/DL (ref 5–25)
CALCIUM SERPL-MCNC: 9.6 MG/DL (ref 8.4–10.2)
CHLORIDE SERPL-SCNC: 99 MMOL/L (ref 96–108)
CLARITY UR: CLEAR
CO2 SERPL-SCNC: 32 MMOL/L (ref 21–32)
COLOR UR: NORMAL
CREAT SERPL-MCNC: 0.79 MG/DL (ref 0.6–1.3)
EOSINOPHIL # BLD AUTO: 0.59 THOUSAND/ΜL (ref 0–0.61)
EOSINOPHIL NFR BLD AUTO: 9 % (ref 0–6)
ERYTHROCYTE [DISTWIDTH] IN BLOOD BY AUTOMATED COUNT: 12.4 % (ref 11.6–15.1)
GFR SERPL CREATININE-BSD FRML MDRD: 80 ML/MIN/1.73SQ M
GLUCOSE SERPL-MCNC: 110 MG/DL (ref 65–140)
GLUCOSE UR STRIP-MCNC: NEGATIVE MG/DL
HCT VFR BLD AUTO: 36.5 % (ref 34.8–46.1)
HGB BLD-MCNC: 12.3 G/DL (ref 11.5–15.4)
HGB UR QL STRIP.AUTO: NEGATIVE
IMM GRANULOCYTES # BLD AUTO: 0.02 THOUSAND/UL (ref 0–0.2)
IMM GRANULOCYTES NFR BLD AUTO: 0 % (ref 0–2)
KETONES UR STRIP-MCNC: NEGATIVE MG/DL
LEUKOCYTE ESTERASE UR QL STRIP: NEGATIVE
LIPASE SERPL-CCNC: 24 U/L (ref 11–82)
LYMPHOCYTES # BLD AUTO: 1.22 THOUSANDS/ΜL (ref 0.6–4.47)
LYMPHOCYTES NFR BLD AUTO: 18 % (ref 14–44)
MCH RBC QN AUTO: 28.7 PG (ref 26.8–34.3)
MCHC RBC AUTO-ENTMCNC: 33.7 G/DL (ref 31.4–37.4)
MCV RBC AUTO: 85 FL (ref 82–98)
MONOCYTES # BLD AUTO: 0.69 THOUSAND/ΜL (ref 0.17–1.22)
MONOCYTES NFR BLD AUTO: 10 % (ref 4–12)
NEUTROPHILS # BLD AUTO: 4.38 THOUSANDS/ΜL (ref 1.85–7.62)
NEUTS SEG NFR BLD AUTO: 62 % (ref 43–75)
NITRITE UR QL STRIP: NEGATIVE
NRBC BLD AUTO-RTO: 0 /100 WBCS
PH UR STRIP.AUTO: 7.5 [PH]
PLATELET # BLD AUTO: 178 THOUSANDS/UL (ref 149–390)
PMV BLD AUTO: 10.3 FL (ref 8.9–12.7)
POTASSIUM SERPL-SCNC: 3.3 MMOL/L (ref 3.5–5.3)
PROT SERPL-MCNC: 6.8 G/DL (ref 6.4–8.4)
PROT UR STRIP-MCNC: NEGATIVE MG/DL
RBC # BLD AUTO: 4.28 MILLION/UL (ref 3.81–5.12)
SODIUM SERPL-SCNC: 137 MMOL/L (ref 135–147)
SP GR UR STRIP.AUTO: 1.01
UROBILINOGEN UR QL STRIP.AUTO: 0.2 E.U./DL
WBC # BLD AUTO: 6.97 THOUSAND/UL (ref 4.31–10.16)

## 2024-10-18 PROCEDURE — 80053 COMPREHEN METABOLIC PANEL: CPT | Performed by: EMERGENCY MEDICINE

## 2024-10-18 PROCEDURE — 81003 URINALYSIS AUTO W/O SCOPE: CPT | Performed by: EMERGENCY MEDICINE

## 2024-10-18 PROCEDURE — 99284 EMERGENCY DEPT VISIT MOD MDM: CPT

## 2024-10-18 PROCEDURE — 36415 COLL VENOUS BLD VENIPUNCTURE: CPT | Performed by: EMERGENCY MEDICINE

## 2024-10-18 PROCEDURE — 74176 CT ABD & PELVIS W/O CONTRAST: CPT

## 2024-10-18 PROCEDURE — 83690 ASSAY OF LIPASE: CPT | Performed by: EMERGENCY MEDICINE

## 2024-10-18 PROCEDURE — 99284 EMERGENCY DEPT VISIT MOD MDM: CPT | Performed by: EMERGENCY MEDICINE

## 2024-10-18 PROCEDURE — 85025 COMPLETE CBC W/AUTO DIFF WBC: CPT | Performed by: EMERGENCY MEDICINE

## 2024-10-18 RX ORDER — CIPROFLOXACIN 500 MG/1
500 TABLET, FILM COATED ORAL 2 TIMES DAILY
Qty: 14 TABLET | Refills: 0 | Status: SHIPPED | OUTPATIENT
Start: 2024-10-18 | End: 2024-10-25

## 2024-10-18 RX ORDER — NAPROXEN 500 MG/1
250 TABLET ORAL ONCE
Status: COMPLETED | OUTPATIENT
Start: 2024-10-18 | End: 2024-10-18

## 2024-10-18 RX ORDER — METRONIDAZOLE 500 MG/1
500 TABLET ORAL ONCE
Status: COMPLETED | OUTPATIENT
Start: 2024-10-18 | End: 2024-10-18

## 2024-10-18 RX ORDER — ONDANSETRON 4 MG/1
4 TABLET, ORALLY DISINTEGRATING ORAL ONCE
Status: COMPLETED | OUTPATIENT
Start: 2024-10-18 | End: 2024-10-18

## 2024-10-18 RX ORDER — CIPROFLOXACIN 500 MG/1
500 TABLET, FILM COATED ORAL ONCE
Status: COMPLETED | OUTPATIENT
Start: 2024-10-18 | End: 2024-10-18

## 2024-10-18 RX ORDER — ONDANSETRON 4 MG/1
4 TABLET, FILM COATED ORAL EVERY 6 HOURS
Qty: 12 TABLET | Refills: 0 | Status: SHIPPED | OUTPATIENT
Start: 2024-10-18

## 2024-10-18 RX ORDER — METRONIDAZOLE 500 MG/1
500 TABLET ORAL EVERY 8 HOURS SCHEDULED
Qty: 21 TABLET | Refills: 0 | Status: SHIPPED | OUTPATIENT
Start: 2024-10-18 | End: 2024-10-25

## 2024-10-18 RX ADMIN — CIPROFLOXACIN 500 MG: 500 TABLET ORAL at 06:48

## 2024-10-18 RX ADMIN — ONDANSETRON 4 MG: 4 TABLET, ORALLY DISINTEGRATING ORAL at 06:48

## 2024-10-18 RX ADMIN — NAPROXEN 250 MG: 500 TABLET ORAL at 05:32

## 2024-10-18 RX ADMIN — METRONIDAZOLE 500 MG: 500 TABLET ORAL at 06:48

## 2024-10-18 NOTE — Clinical Note
Norma Mercedes was seen and treated in our emergency department on 10/18/2024.                Diagnosis:     Norma  may return to work on return date.    She may return on this date: 10/21/2024         If you have any questions or concerns, please don't hesitate to call.      Wilmer Preciado MD    ______________________________           _______________          _______________  Hospital Representative                              Date                                Time
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
## 2024-10-21 ENCOUNTER — VBI (OUTPATIENT)
Dept: FAMILY MEDICINE CLINIC | Facility: CLINIC | Age: 64
End: 2024-10-21

## 2024-10-21 NOTE — TELEPHONE ENCOUNTER
10/21/24 9:47 AM    Patient contacted post ED visit, outreach attempt made but message could not be left. Additional outreach attempt will be made.     Thank you.  Jamie Tomas MA  [facility]

## 2024-10-22 NOTE — TELEPHONE ENCOUNTER
10/22/24 10:05 AM    Patient contacted post ED visit, [facility] department spoke with patient/caregiver and outreach was successful.    Thank you.  Jamie Tomas MA  [facility]
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
## 2024-10-25 ENCOUNTER — CONSULT (OUTPATIENT)
Dept: SURGERY | Facility: CLINIC | Age: 64
End: 2024-10-25
Payer: COMMERCIAL

## 2024-10-25 VITALS
OXYGEN SATURATION: 100 % | SYSTOLIC BLOOD PRESSURE: 128 MMHG | HEART RATE: 72 BPM | RESPIRATION RATE: 18 BRPM | WEIGHT: 198 LBS | HEIGHT: 63 IN | DIASTOLIC BLOOD PRESSURE: 90 MMHG | BODY MASS INDEX: 35.08 KG/M2 | TEMPERATURE: 97.8 F

## 2024-10-25 DIAGNOSIS — K57.92 DIVERTICULITIS: ICD-10-CM

## 2024-10-25 DIAGNOSIS — K57.32 SIGMOID DIVERTICULITIS: Primary | ICD-10-CM

## 2024-10-25 PROCEDURE — 99203 OFFICE O/P NEW LOW 30 MIN: CPT | Performed by: SURGERY

## 2024-10-25 NOTE — ASSESSMENT & PLAN NOTE
Patient returns the office status post ER evaluation where she was diagnosed and treated for complicated acute sigmoid diverticulitis.    Patient has finished her weeks course of oral antibiotic therapy and reports general improvement in her symptom complex.    Patient denies prior bouts of acute diverticulitis necessitating medical therapy.    She is status post colonoscopy in 2023 which did identify the presence of diverticulosis.    On physical examination she is well-appearing.  Pleasant competent reliable as a historian.  She has benign abdominal examination free from tenderness to percussion and deep palpation in 4 quadrants.  No guarding rebound or peritoneal signs.  No masses noted no hernias appreciated.    Natural history of sigmoid diverticular disease outlined for the patient.    Patient was educated regarding the importance of maintaining a high-fiber low-fat diet.    She was dissuaded of the old lifestyle regarding the avoidance of seeds and nuts in her diet.    She is clinically stable at the present time without indications for additional investigations or interventions.    I look forward to seeing her back in a years time.  She has been encouraged to follow-up sooner on an as-needed basis.

## 2024-11-22 ENCOUNTER — OFFICE VISIT (OUTPATIENT)
Dept: FAMILY MEDICINE CLINIC | Facility: CLINIC | Age: 64
End: 2024-11-22
Payer: COMMERCIAL

## 2024-11-22 VITALS
BODY MASS INDEX: 34.48 KG/M2 | SYSTOLIC BLOOD PRESSURE: 132 MMHG | HEART RATE: 68 BPM | WEIGHT: 194.6 LBS | OXYGEN SATURATION: 98 % | HEIGHT: 63 IN | DIASTOLIC BLOOD PRESSURE: 84 MMHG | TEMPERATURE: 98.2 F

## 2024-11-22 DIAGNOSIS — G43.909 MIGRAINE WITHOUT STATUS MIGRAINOSUS, NOT INTRACTABLE, UNSPECIFIED MIGRAINE TYPE: ICD-10-CM

## 2024-11-22 DIAGNOSIS — Z00.00 WELL ADULT EXAM: Primary | ICD-10-CM

## 2024-11-22 DIAGNOSIS — E87.6 HYPOKALEMIA: ICD-10-CM

## 2024-11-22 DIAGNOSIS — E03.9 HYPOTHYROIDISM, UNSPECIFIED TYPE: ICD-10-CM

## 2024-11-22 DIAGNOSIS — Z17.0 MALIGNANT NEOPLASM OF LOWER-OUTER QUADRANT OF RIGHT BREAST OF FEMALE, ESTROGEN RECEPTOR POSITIVE (HCC): ICD-10-CM

## 2024-11-22 DIAGNOSIS — K57.32 SIGMOID DIVERTICULITIS: ICD-10-CM

## 2024-11-22 DIAGNOSIS — Z11.4 SCREENING FOR HIV (HUMAN IMMUNODEFICIENCY VIRUS): ICD-10-CM

## 2024-11-22 DIAGNOSIS — L91.8 SKIN TAG: ICD-10-CM

## 2024-11-22 DIAGNOSIS — C50.511 MALIGNANT NEOPLASM OF LOWER-OUTER QUADRANT OF RIGHT BREAST OF FEMALE, ESTROGEN RECEPTOR POSITIVE (HCC): ICD-10-CM

## 2024-11-22 PROBLEM — M25.432 SWELLING OF LEFT WRIST: Status: RESOLVED | Noted: 2024-04-03 | Resolved: 2024-11-22

## 2024-11-22 PROBLEM — R42 VERTIGO: Status: RESOLVED | Noted: 2022-05-17 | Resolved: 2024-11-22

## 2024-11-22 PROCEDURE — 99213 OFFICE O/P EST LOW 20 MIN: CPT | Performed by: FAMILY MEDICINE

## 2024-11-22 PROCEDURE — 88305 TISSUE EXAM BY PATHOLOGIST: CPT | Performed by: STUDENT IN AN ORGANIZED HEALTH CARE EDUCATION/TRAINING PROGRAM

## 2024-11-22 PROCEDURE — 11305 SHAVE SKIN LESION 0.5 CM/<: CPT | Performed by: FAMILY MEDICINE

## 2024-11-22 PROCEDURE — 99396 PREV VISIT EST AGE 40-64: CPT | Performed by: FAMILY MEDICINE

## 2024-11-22 RX ORDER — RIZATRIPTAN BENZOATE 10 MG/1
TABLET, ORALLY DISINTEGRATING ORAL
Qty: 27 TABLET | Refills: 0 | Status: SHIPPED | OUTPATIENT
Start: 2024-11-22

## 2024-11-22 NOTE — PROGRESS NOTES
Shave lesion    Date/Time: 11/22/2024 9:45 AM    Performed by: Kayode Bland DO  Authorized by: Kayode Bland DO  Universal Protocol:  procedure performed by consultantConsent: Verbal consent obtained. Written consent not obtained.  Risks and benefits: risks, benefits and alternatives were discussed  Consent given by: patient  Patient understanding: patient states understanding of the procedure being performed  Patient identity confirmed: verbally with patient    Number of Lesions: 1  Lesion 1:     Body area: head/neck    Head/neck location: neck    Initial size (mm): 5    Final defect size (mm): 5    Malignancy: benign lesion      Destruction method: shave removal      Comments:  Lesion sent for biopsy.  Await results.  Wound care instructions provided.

## 2024-11-22 NOTE — PROGRESS NOTES
Assessment/Plan: Anticipatory guidance provided.  She does need refill on rizatriptan.  Recommend repeat blood testing as below.  Skin tag removed and sent for biopsy as noted below.  Wound care instructions provided.  Continue breast cancer surveillance.  Recommend good diet and regular exercise.  Patient is otherwise up-to-date on screening.     1. Well adult exam  2. Screening for HIV (human immunodeficiency virus)  -     HIV 1/2 AG/AB w Reflex [facility] for 2 yr old and above; Future  3. Migraine without status migrainosus, not intractable, unspecified migraine type  -     rizatriptan (MAXALT-MLT) 10 mg disintegrating tablet; Take one tablet at onset of headache. May repeat once in 2 hrs as needed.  4. Hypokalemia  -     Basic metabolic panel; Future  -     Magnesium; Future  5. Skin tag  -     Tissue Exam; Future  6. Sigmoid diverticulitis  7. Hypothyroidism, unspecified type  8. Malignant neoplasm of lower-outer quadrant of right breast of female, estrogen receptor positive (HCC)        Subjective:      Patient ID: Norma Mercedes is a 64 y.o. female.    Patient is here today for well check.  She did have recent hospitalization for diverticulitis and had low potassium level.  She is generally feeling better after treatment but has not had her potassium rechecked.  She also has a skin tag to the left neck that she would like to have removed today.  She is generally otherwise up-to-date on screening.  She does have history of breast cancer without evidence of recurrence.             The following portions of the patient's history were reviewed and updated as appropriate: allergies, current medications, past family history, past medical history, past social history, past surgical history, and problem list.    Review of Systems   Constitutional: Negative.    HENT: Negative.     Eyes: Negative.    Respiratory: Negative.     Cardiovascular: Negative.    Gastrointestinal: Negative.    Endocrine: Negative.   "  Genitourinary: Negative.    Musculoskeletal: Negative.    Skin: Negative.         As noted in HPI   Allergic/Immunologic: Negative.    Neurological: Negative.    Hematological: Negative.    Psychiatric/Behavioral: Negative.           Objective:      /84   Pulse 68   Temp 98.2 °F (36.8 °C) (Tympanic)   Ht 5' 3\" (1.6 m)   Wt 88.3 kg (194 lb 9.6 oz)   LMP  (LMP Unknown)   SpO2 98%   BMI 34.47 kg/m²          Physical Exam  Vitals reviewed.   Constitutional:       Appearance: She is well-developed.   HENT:      Head: Normocephalic and atraumatic.      Right Ear: External ear normal. Tympanic membrane is not erythematous or bulging.      Left Ear: External ear normal. Tympanic membrane is not erythematous or bulging.      Nose: Nose normal.      Mouth/Throat:      Mouth: No oral lesions.      Pharynx: No oropharyngeal exudate.   Eyes:      General: No scleral icterus.        Right eye: No discharge.         Left eye: No discharge.      Conjunctiva/sclera: Conjunctivae normal.   Neck:      Thyroid: No thyromegaly.   Cardiovascular:      Rate and Rhythm: Normal rate and regular rhythm.      Heart sounds: Normal heart sounds. No murmur heard.     No friction rub. No gallop.   Pulmonary:      Effort: Pulmonary effort is normal. No respiratory distress.      Breath sounds: No wheezing or rales.   Chest:      Chest wall: No tenderness.   Abdominal:      General: Bowel sounds are normal. There is no distension.      Palpations: Abdomen is soft. There is no mass.      Tenderness: There is no abdominal tenderness. There is no guarding or rebound.   Musculoskeletal:         General: No tenderness or deformity. Normal range of motion.      Cervical back: Normal range of motion and neck supple.   Lymphadenopathy:      Cervical: No cervical adenopathy.   Skin:     General: Skin is warm and dry.      Coloration: Skin is not pale.      Findings: No erythema or rash.      Comments: 5 mm pigmented skin tag to the left " lateral neck   Neurological:      Mental Status: She is alert and oriented to person, place, and time.      Cranial Nerves: No cranial nerve deficit.      Motor: No abnormal muscle tone.      Coordination: Coordination normal.      Deep Tendon Reflexes: Reflexes are normal and symmetric.   Psychiatric:         Behavior: Behavior normal.

## 2024-11-27 DIAGNOSIS — D32.9 BENIGN MENINGIOMA (HCC): Primary | ICD-10-CM

## 2024-11-29 ENCOUNTER — RESULTS FOLLOW-UP (OUTPATIENT)
Dept: FAMILY MEDICINE CLINIC | Facility: CLINIC | Age: 64
End: 2024-11-29

## 2024-11-29 PROCEDURE — 88305 TISSUE EXAM BY PATHOLOGIST: CPT | Performed by: STUDENT IN AN ORGANIZED HEALTH CARE EDUCATION/TRAINING PROGRAM

## 2024-12-12 ENCOUNTER — APPOINTMENT (OUTPATIENT)
Dept: LAB | Facility: CLINIC | Age: 64
End: 2024-12-12
Payer: COMMERCIAL

## 2024-12-12 DIAGNOSIS — I10 ESSENTIAL HYPERTENSION: ICD-10-CM

## 2024-12-12 DIAGNOSIS — Z11.4 SCREENING FOR HIV (HUMAN IMMUNODEFICIENCY VIRUS): ICD-10-CM

## 2024-12-12 DIAGNOSIS — E87.6 HYPOKALEMIA: ICD-10-CM

## 2024-12-12 LAB
ANION GAP SERPL CALCULATED.3IONS-SCNC: 8 MMOL/L (ref 4–13)
BUN SERPL-MCNC: 13 MG/DL (ref 5–25)
CALCIUM SERPL-MCNC: 10 MG/DL (ref 8.4–10.2)
CHLORIDE SERPL-SCNC: 97 MMOL/L (ref 96–108)
CO2 SERPL-SCNC: 33 MMOL/L (ref 21–32)
CREAT SERPL-MCNC: 0.84 MG/DL (ref 0.6–1.3)
GFR SERPL CREATININE-BSD FRML MDRD: 74 ML/MIN/1.73SQ M
GLUCOSE P FAST SERPL-MCNC: 100 MG/DL (ref 65–99)
HIV 1+2 AB+HIV1 P24 AG SERPL QL IA: NORMAL
HIV 2 AB SERPL QL IA: NORMAL
HIV1 AB SERPL QL IA: NORMAL
HIV1 P24 AG SERPL QL IA: NORMAL
MAGNESIUM SERPL-MCNC: 2.3 MG/DL (ref 1.9–2.7)
POTASSIUM SERPL-SCNC: 3.6 MMOL/L (ref 3.5–5.3)
SODIUM SERPL-SCNC: 138 MMOL/L (ref 135–147)

## 2024-12-12 PROCEDURE — 36415 COLL VENOUS BLD VENIPUNCTURE: CPT

## 2024-12-12 PROCEDURE — 87389 HIV-1 AG W/HIV-1&-2 AB AG IA: CPT

## 2024-12-12 PROCEDURE — 83735 ASSAY OF MAGNESIUM: CPT

## 2024-12-12 PROCEDURE — 80048 BASIC METABOLIC PNL TOTAL CA: CPT

## 2024-12-14 ENCOUNTER — RESULTS FOLLOW-UP (OUTPATIENT)
Dept: FAMILY MEDICINE CLINIC | Facility: CLINIC | Age: 64
End: 2024-12-14

## 2024-12-18 DIAGNOSIS — Z17.0 MALIGNANT NEOPLASM OF LOWER-OUTER QUADRANT OF RIGHT BREAST OF FEMALE, ESTROGEN RECEPTOR POSITIVE (HCC): ICD-10-CM

## 2024-12-18 DIAGNOSIS — C50.511 MALIGNANT NEOPLASM OF LOWER-OUTER QUADRANT OF RIGHT BREAST OF FEMALE, ESTROGEN RECEPTOR POSITIVE (HCC): ICD-10-CM

## 2024-12-18 DIAGNOSIS — I10 ESSENTIAL HYPERTENSION: ICD-10-CM

## 2024-12-18 RX ORDER — ANASTROZOLE 1 MG/1
1 TABLET ORAL DAILY
Qty: 90 TABLET | Refills: 3 | Status: SHIPPED | OUTPATIENT
Start: 2024-12-18

## 2024-12-19 RX ORDER — LOSARTAN POTASSIUM 100 MG/1
100 TABLET ORAL EVERY MORNING
Qty: 90 TABLET | Refills: 1 | Status: SHIPPED | OUTPATIENT
Start: 2024-12-19

## 2025-01-06 DIAGNOSIS — G43.909 MIGRAINE WITHOUT STATUS MIGRAINOSUS, NOT INTRACTABLE, UNSPECIFIED MIGRAINE TYPE: ICD-10-CM

## 2025-01-07 RX ORDER — RIZATRIPTAN BENZOATE 10 MG/1
TABLET, ORALLY DISINTEGRATING ORAL
Qty: 9 TABLET | Refills: 2 | Status: SHIPPED | OUTPATIENT
Start: 2025-01-07

## 2025-01-28 DIAGNOSIS — I10 ESSENTIAL HYPERTENSION: ICD-10-CM

## 2025-01-28 RX ORDER — AMLODIPINE BESYLATE 2.5 MG/1
2.5 TABLET ORAL DAILY
Qty: 90 TABLET | Refills: 1 | Status: SHIPPED | OUTPATIENT
Start: 2025-01-28

## 2025-02-13 ENCOUNTER — ONCOLOGY SURVIVORSHIP (OUTPATIENT)
Dept: SURGICAL ONCOLOGY | Facility: CLINIC | Age: 65
End: 2025-02-13
Payer: COMMERCIAL

## 2025-02-13 VITALS
OXYGEN SATURATION: 98 % | BODY MASS INDEX: 35.44 KG/M2 | TEMPERATURE: 97.2 F | SYSTOLIC BLOOD PRESSURE: 122 MMHG | HEART RATE: 69 BPM | DIASTOLIC BLOOD PRESSURE: 80 MMHG | WEIGHT: 200 LBS | HEIGHT: 63 IN

## 2025-02-13 DIAGNOSIS — Z79.811 USE OF ANASTROZOLE: ICD-10-CM

## 2025-02-13 DIAGNOSIS — Z17.0 MALIGNANT NEOPLASM OF LOWER-OUTER QUADRANT OF RIGHT BREAST OF FEMALE, ESTROGEN RECEPTOR POSITIVE (HCC): Primary | ICD-10-CM

## 2025-02-13 DIAGNOSIS — C50.511 MALIGNANT NEOPLASM OF LOWER-OUTER QUADRANT OF RIGHT BREAST OF FEMALE, ESTROGEN RECEPTOR POSITIVE (HCC): Primary | ICD-10-CM

## 2025-02-13 PROCEDURE — 99213 OFFICE O/P EST LOW 20 MIN: CPT | Performed by: NURSE PRACTITIONER

## 2025-02-13 NOTE — ASSESSMENT & PLAN NOTE
Patient is a 64-year-old female that was diagnosed with right breast cancer in April 2022.  Her pathology revealed invasive ductal carcinoma, %, UT 45%, HER2 negative.  She underwent genetic testing which was negative.  She underwent a right lumpectomy and sentinel lymph biopsy and underwent intraoperative radiation therapy.  She is currently taking anastrozole. Follow up breast cancer survivorship visit performed today.  She had a bilateral mammogram and left breast ultrasound in May of 2024 which was BI-RADS 2, category 3 density. There were benign cysts in the left breast.  She offers no new complaints today and there are no concerns on today's clinical exam. Script given for mammogram. She is up to date on colorectal and cervical cancer screenings as well was osteoporosis screening.  Prescription given for her next mammogram.  We will see her back in 6 months for a follow-up visit or sooner should the need arise.  She was instructed to contact us with any changes or concerns in the interim.  All of her questions were answered today.

## 2025-02-13 NOTE — PROGRESS NOTES
Name: Norma Mercedes      : 1960      MRN: [id number]  Encounter Provider: LEA Olea  Encounter Date: 2025   Encounter department: [facility] [place]  :  Assessment & Plan  Malignant neoplasm of lower-outer quadrant of right breast of female, estrogen receptor positive (HCC)    Orders:  •  Mammo diagnostic bilateral w 3d and cad; Future    Use of anastrozole       Patient is a 64-year-old female that was diagnosed with right breast cancer in 2022.  Her pathology revealed invasive ductal carcinoma, %, NE 45%, HER2 negative.  She underwent genetic testing which was negative.  She underwent a right lumpectomy and sentinel lymph biopsy and underwent intraoperative radiation therapy.  She is currently taking anastrozole. Follow up breast cancer survivorship visit performed today.  She had a bilateral mammogram and left breast ultrasound in May of 2024 which was BI-RADS 2, category 3 density. There were benign cysts in the left breast.  She offers no new complaints today and there are no concerns on today's clinical exam. Script given for mammogram. She is up to date on colorectal and cervical cancer screenings as well was osteoporosis screening.  Prescription given for her next mammogram.  We will see her back in 6 months for a follow-up visit or sooner should the need arise.  She was instructed to contact us with any changes or concerns in the interim.  All of her questions were answered today.      Survivorship  Discussed symptoms related to disease recurrence, Yes     Evaluated for late effects related to cancer treatment, Yes     Screening current for cervical cancer, Yes     Screening current for colon cancer, Yes     Cancer rehabilitation services addressed, No     Screening current for osteoporosis, Yes       History of Present Illness   Norma Mercedes is a 64 y.o. year old female who presents today for follow-up visit.  She has not  appreciated any changes on self breast exam.  She continues on anastrozole.  She denies persistent headaches, back pain or bone pain, cough or shortness of breath, abdominal pain..     Oncology History   Cancer Staging   Benign meningioma (HCC)  Staging form: Central Nervous System Tumors, Pediatric Staging  - Clinical: No stage assigned - Unsigned    Malignant neoplasm of lower-outer quadrant of right breast of female, estrogen receptor positive (HCC)  Staging form: Breast, AJCC 8th Edition  - Clinical stage from 4/19/2022: Stage IA (cT1c, cN0(f), cM0, G1, ER+, PA+, HER2-) - Signed by Beverley Burton MD on 4/19/2022  Stage prefix: Initial diagnosis  Method of lymph node assessment: Core biopsy  Histologic grading system: 3 grade system  - Pathologic stage from 7/7/2022: Stage IA (pT1c, pN0(sn), cM0, G1, ER+, PA+, HER2-) - Signed by Beverley Burton MD on 7/7/2022  Stage prefix: Initial diagnosis  Method of lymph node assessment: [place] lymph node biopsy  Histologic grading system: 3 grade system  Oncology History   Benign meningioma (HCC)   2014 Initial Diagnosis    Benign meningioma (HCC)     6/16/2014 Surgery    suboccipital craniotomy and C1 laminectomy- pathology was consistent with meningioma who grade 1     7/6/2016 - 7/25/2016 Radiation    Plan ID Energy Fractions Dose per Fraction (cGy) Total Dose Delivered (cGy) Elapsed Days   SRT R [id number] 6X 3 / 3 450 1,350 5   SRT R Foramen 6X 2 / 2 500 1,000 2           Malignant neoplasm of lower-outer quadrant of right breast of female, estrogen receptor positive (HCC)   4/8/2022 Biopsy    Right breast biopsy:  - Invasive mammary carcinoma of no special type (ductal).    %, PA 45%, HER2 1+ negative     Right axillary lymph node  - negative for carcinoma       4/19/2022 -  Cancer Staged    Staging form: Breast, AJCC 8th Edition  - Clinical stage from 4/19/2022: Stage IA (cT1c, cN0(f), cM0, G1, ER+, PA+, HER2-) - Signed by Beverley Burton MD on 4/19/2022  Stage  "prefix: Initial diagnosis  Method of lymph node assessment: Core biopsy  Histologic grading system: 3 grade system       5/2022 Genetic Testing    [facility] BRCAplus STAT Panel (8 genes): ELEANOR, BRCA1, BRCA2, CDH1, CHEK2, PALB2, PTEN, TP53  Test(s): APC, AXIN2 BARD1, BRIP1, BMPR1A, CDK4, CDKN2A, DICER1, EPCAM, GREM1, HOXB13, MLH1, MSH2, MSH3, MSH6, MUTYH, NBN, NF1, NTHL1, PMS2, POLD1, POLE, RAD51C, RAD51D, RECQL SMAD4, SMARCA4, STK11      Result:   Negative - No Clinically Significant Variants Detected       6/23/2022 - 6/23/2022 Radiation    Field Numbers Energy Treatment Site Starting  Ending  Elapsed  Fraction Total  Overlap Site         Date Date Days Dose Dose     IORT  50 kVp Right Breast 6-23-22 6-23-22 0 2000 cGy 2000 cGy       Dr Paulino     6/23/2022 Surgery    Right breast lumpectomy, SLNB (Dr Burton), IORT  - Negative margins  - 0/2 lymph nodes       7/22/2022 -  Hormone Therapy    Anastrozole 1 mg daily    Dr Mcqueen        Review of Systems   Constitutional:  Negative for activity change, appetite change, chills, fatigue, fever and unexpected weight change.   Respiratory:  Negative for cough and shortness of breath.    Cardiovascular:  Negative for chest pain.   Gastrointestinal:  Negative for abdominal pain, constipation, diarrhea, nausea and vomiting.   Musculoskeletal:  Positive for arthralgias. Negative for back pain, gait problem and myalgias.   Skin:  Negative for color change and rash.   Neurological:  Negative for dizziness and headaches.   Hematological:  Negative for adenopathy.   Psychiatric/Behavioral:  Negative for agitation and confusion.    All other systems reviewed and are negative.   A complete review of systems is negative other than that noted above in the HPI.           Objective   /80 (BP Location: Left arm, Patient Position: Sitting, Cuff Size: Large)   Pulse 69   Temp (!) 97.2 °F (36.2 °C) (Temporal)   Ht 5' 3\" (1.6 m)   Wt 90.7 kg (200 lb)   LMP  (LMP Unknown)   SpO2 98%  "  BMI 35.43 kg/m²     Pain Screening:  Pain Score: 0-No pain  ECOG    Physical Exam  Vitals reviewed.   Constitutional:       General: She is not in acute distress.     Appearance: Normal appearance. She is well-developed. She is not diaphoretic.   HENT:      Head: Normocephalic and atraumatic.   Cardiovascular:      Rate and Rhythm: Normal rate and regular rhythm.      Heart sounds: Normal heart sounds.   Pulmonary:      Effort: Pulmonary effort is normal.      Breath sounds: Normal breath sounds.   Chest:   Breasts:     Right: Skin change (surgical scars) present. No swelling, bleeding, inverted nipple, mass, nipple discharge or tenderness.      Left: No swelling, bleeding, inverted nipple, mass, nipple discharge, skin change or tenderness.      Comments: Mild fibrosis s/p IORT  Abdominal:      Palpations: Abdomen is soft. There is no mass.      Tenderness: There is no abdominal tenderness.   Musculoskeletal:         General: Normal range of motion.      Cervical back: Normal range of motion.   Lymphadenopathy:      Upper Body:      Right upper body: No supraclavicular or axillary adenopathy.      Left upper body: No supraclavicular or axillary adenopathy.   Skin:     General: Skin is warm and dry.      Findings: No rash.   Neurological:      Mental Status: She is alert and oriented to person, place, and time.   Psychiatric:         Speech: Speech normal.

## 2025-02-20 DIAGNOSIS — R73.01 ELEVATED FASTING BLOOD SUGAR: Primary | ICD-10-CM

## 2025-02-25 ENCOUNTER — APPOINTMENT (OUTPATIENT)
Dept: LAB | Facility: CLINIC | Age: 65
End: 2025-02-25
Payer: COMMERCIAL

## 2025-02-25 ENCOUNTER — TELEPHONE (OUTPATIENT)
Age: 65
End: 2025-02-25

## 2025-02-25 DIAGNOSIS — Z17.0 MALIGNANT NEOPLASM OF LOWER-OUTER QUADRANT OF RIGHT BREAST OF FEMALE, ESTROGEN RECEPTOR POSITIVE (HCC): ICD-10-CM

## 2025-02-25 DIAGNOSIS — C50.511 MALIGNANT NEOPLASM OF LOWER-OUTER QUADRANT OF RIGHT BREAST OF FEMALE, ESTROGEN RECEPTOR POSITIVE (HCC): ICD-10-CM

## 2025-02-25 DIAGNOSIS — I10 PRIMARY HYPERTENSION: ICD-10-CM

## 2025-02-25 DIAGNOSIS — M85.80 OSTEOPENIA, UNSPECIFIED LOCATION: ICD-10-CM

## 2025-02-25 DIAGNOSIS — I10 PRIMARY HYPERTENSION: Primary | ICD-10-CM

## 2025-02-25 DIAGNOSIS — Z79.811 AROMATASE INHIBITOR USE: ICD-10-CM

## 2025-02-25 LAB
BASOPHILS # BLD AUTO: 0.05 THOUSANDS/ÂΜL (ref 0–0.1)
BASOPHILS NFR BLD AUTO: 1 % (ref 0–1)
EOSINOPHIL # BLD AUTO: 0.19 THOUSAND/ÂΜL (ref 0–0.61)
EOSINOPHIL NFR BLD AUTO: 4 % (ref 0–6)
ERYTHROCYTE [DISTWIDTH] IN BLOOD BY AUTOMATED COUNT: 12.4 % (ref 11.6–15.1)
HCT VFR BLD AUTO: 37.8 % (ref 34.8–46.1)
HGB BLD-MCNC: 12.7 G/DL (ref 11.5–15.4)
IMM GRANULOCYTES # BLD AUTO: 0.01 THOUSAND/UL (ref 0–0.2)
IMM GRANULOCYTES NFR BLD AUTO: 0 % (ref 0–2)
LYMPHOCYTES # BLD AUTO: 1.73 THOUSANDS/ÂΜL (ref 0.6–4.47)
LYMPHOCYTES NFR BLD AUTO: 38 % (ref 14–44)
MCH RBC QN AUTO: 28.5 PG (ref 26.8–34.3)
MCHC RBC AUTO-ENTMCNC: 33.6 G/DL (ref 31.4–37.4)
MCV RBC AUTO: 85 FL (ref 82–98)
MONOCYTES # BLD AUTO: 0.44 THOUSAND/ÂΜL (ref 0.17–1.22)
MONOCYTES NFR BLD AUTO: 10 % (ref 4–12)
NEUTROPHILS # BLD AUTO: 2.14 THOUSANDS/ÂΜL (ref 1.85–7.62)
NEUTS SEG NFR BLD AUTO: 47 % (ref 43–75)
NRBC BLD AUTO-RTO: 0 /100 WBCS
PLATELET # BLD AUTO: 205 THOUSANDS/UL (ref 149–390)
PMV BLD AUTO: 12.1 FL (ref 8.9–12.7)
RBC # BLD AUTO: 4.46 MILLION/UL (ref 3.81–5.12)
WBC # BLD AUTO: 4.56 THOUSAND/UL (ref 4.31–10.16)

## 2025-02-25 PROCEDURE — 80048 BASIC METABOLIC PNL TOTAL CA: CPT

## 2025-02-25 PROCEDURE — 85025 COMPLETE CBC W/AUTO DIFF WBC: CPT

## 2025-02-25 PROCEDURE — 36415 COLL VENOUS BLD VENIPUNCTURE: CPT

## 2025-02-25 NOTE — TELEPHONE ENCOUNTER
Patient asking for an add on for her lab work for her BUN and Creatine. The lab did her blood draw today and took the extra tube just need the order. Her blood will be thrown away at 3:00pm if not in the system.

## 2025-02-25 NOTE — TELEPHONE ENCOUNTER
Spoke to pt -- relayed provider's message below. Verbalized understanding and no further questions at this time.

## 2025-02-25 NOTE — TELEPHONE ENCOUNTER
"Patient called in requesting update on call she placed earlier regarding the extra tube of blood that was taken at lab this morning - please see prior notes for details.   Patient asking for call back   As they \"can only hold it until 3 pm\"  "

## 2025-02-26 LAB
ANION GAP SERPL CALCULATED.3IONS-SCNC: 12 MMOL/L (ref 4–13)
BUN SERPL-MCNC: 13 MG/DL (ref 5–25)
CALCIUM SERPL-MCNC: 9.8 MG/DL (ref 8.4–10.2)
CHLORIDE SERPL-SCNC: 97 MMOL/L (ref 96–108)
CO2 SERPL-SCNC: 27 MMOL/L (ref 21–32)
CREAT SERPL-MCNC: 0.75 MG/DL (ref 0.6–1.3)
GFR SERPL CREATININE-BSD FRML MDRD: 85 ML/MIN/1.73SQ M
GLUCOSE SERPL-MCNC: 123 MG/DL (ref 65–140)
POTASSIUM SERPL-SCNC: 3.6 MMOL/L (ref 3.5–5.3)
SODIUM SERPL-SCNC: 136 MMOL/L (ref 135–147)

## 2025-02-28 ENCOUNTER — RESULTS FOLLOW-UP (OUTPATIENT)
Dept: FAMILY MEDICINE CLINIC | Facility: CLINIC | Age: 65
End: 2025-02-28

## 2025-02-28 ENCOUNTER — HOSPITAL ENCOUNTER (OUTPATIENT)
Dept: MRI IMAGING | Facility: HOSPITAL | Age: 65
End: 2025-02-28
Payer: COMMERCIAL

## 2025-02-28 DIAGNOSIS — D32.9 BENIGN MENINGIOMA (HCC): ICD-10-CM

## 2025-02-28 PROCEDURE — A9585 GADOBUTROL INJECTION: HCPCS | Performed by: RADIOLOGY

## 2025-02-28 PROCEDURE — 70553 MRI BRAIN STEM W/O & W/DYE: CPT

## 2025-02-28 RX ORDER — GADOBUTROL 604.72 MG/ML
9 INJECTION INTRAVENOUS
Status: COMPLETED | OUTPATIENT
Start: 2025-02-28 | End: 2025-02-28

## 2025-02-28 RX ADMIN — GADOBUTROL 9 ML: 604.72 INJECTION INTRAVENOUS at 09:15

## 2025-03-01 ENCOUNTER — RESULTS FOLLOW-UP (OUTPATIENT)
Dept: FAMILY MEDICINE CLINIC | Facility: CLINIC | Age: 65
End: 2025-03-01

## 2025-03-05 ENCOUNTER — OFFICE VISIT (OUTPATIENT)
Dept: RADIATION ONCOLOGY | Facility: CLINIC | Age: 65
End: 2025-03-05
Attending: RADIOLOGY
Payer: COMMERCIAL

## 2025-03-05 VITALS
OXYGEN SATURATION: 98 % | WEIGHT: 201 LBS | HEART RATE: 70 BPM | RESPIRATION RATE: 17 BRPM | TEMPERATURE: 97 F | DIASTOLIC BLOOD PRESSURE: 95 MMHG | BODY MASS INDEX: 35.61 KG/M2 | SYSTOLIC BLOOD PRESSURE: 140 MMHG

## 2025-03-05 DIAGNOSIS — D32.9 BENIGN MENINGIOMA (HCC): Primary | ICD-10-CM

## 2025-03-05 PROCEDURE — 99213 OFFICE O/P EST LOW 20 MIN: CPT | Performed by: RADIOLOGY

## 2025-03-05 NOTE — PROGRESS NOTES
Follow-up Visit   Name: Norma Mercedes      : 1960      MRN: [id number]  Encounter Provider: Vitaliy Lora MD  Encounter Date: 3/5/2025   Encounter department: [facility]  :  Assessment & Plan  Benign meningioma (HCC)    Orders:  •  MRI brain w wo contrast; Future  •  BUN; Future  •  Creatinine, serum; Future  Norma Mercedes is a 64 y.o. year old female who is now 9 years status post SRT for a meningioma.  I reviewed her recent MRI of the brain which is stable compared to prior studies.  She also underwent IORT stage IA right breast carcinoma with follow-up mammogram stable.  She remains on anastrozole.  She follows with Dr. Reynoso and surgical oncology.  Since she is doing well now 9 years post completion of treatment, she will return in 2 years for follow-up with a repeat MRI of the brain.        History of Present Illness   Chief Complaint   Patient presents with   • Follow-up   Pertinent Medical History     Norma Mercedes 1960 is a 64 y.o. female With history of meningioma she completed SRT on 2016. She has history of invasive ductal carcinoma of the right breast s/p lumpectomy and IORT 22. Last seen by  on 3/8/23. Returns for 2 year follow up.      25 [place]   Exam normal.      25 MRI Brain  IMPRESSION:  No significant interval change evident compared to the 2023 MRI. Again noted postoperative and post radiotherapy changes for meningioma with residual meningioma as detailed above.     Associated innervation fatty atrophy of the right side of the tongue due to the involvement of the right hypoglossal nerve.     Encasement of the V3 and V4 segments of the right vertebral artery; associated patency cannot be accurately assessed on this examination. If further imaging assessment for patency of the right vertebral artery is desired, MR angiography of the head and   neck without gadolinium could be obtained.     Mild  to moderate altered signal involving white matter discussed above is a nonspecific finding most often relating to chronic small vessel white matter ischemic disease; the degree of which greater than typical for age and appears progressed somewhat   when compared to the prior.     Patient seen for follow-up alone today.  She reports she is feeling well.  She denies any dizziness nor mental status changes.  She has occasional headaches that are stable.  She denies any breast masses nor breast pain.  She is on anastrozole and has a few mild hot flashes per day along with some joint aches and pains which are overall much better since when she started the medication.  She does not smoke any cigarettes and drinks socially.  She works full-time as a supervisor for [employer].    Upcomin25 Mammogram  25      Oncology History   Cancer Staging   Benign meningioma (HCC)  Staging form: Central Nervous System Tumors, Pediatric Staging  - Clinical: No stage assigned - Unsigned    Malignant neoplasm of lower-outer quadrant of right breast of female, estrogen receptor positive (HCC)  Staging form: Breast, AJCC 8th Edition  - Clinical stage from 2022: Stage IA (cT1c, cN0(f), cM0, G1, ER+, NM+, HER2-) - Signed by Beverley Burton MD on 2022  Stage prefix: Initial diagnosis  Method of lymph node assessment: Core biopsy  Histologic grading system: 3 grade system  - Pathologic stage from 2022: Stage IA (pT1c, pN0(sn), cM0, G1, ER+, NM+, HER2-) - Signed by Beverley Burton MD on 2022  Stage prefix: Initial diagnosis  Method of lymph node assessment: [place] lymph node biopsy  Histologic grading system: 3 grade system  Oncology History   Benign meningioma (HCC)    Initial Diagnosis    Benign meningioma (HCC)     2014 Surgery    suboccipital craniotomy and C1 laminectomy- pathology was consistent with meningioma who grade 1     2016 - 2016 Radiation    Plan ID Energy Fractions Dose per Fraction  (cGy) Total Dose Delivered (cGy) Elapsed Days   SRT R [id number] 6X 3 / 3 450 1,350 5   SRT R Foramen 6X 2 / 2 500 1,000 2           Malignant neoplasm of lower-outer quadrant of right breast of female, estrogen receptor positive (HCC)   4/8/2022 Biopsy    Right breast biopsy:  - Invasive mammary carcinoma of no special type (ductal).    %, [place] 45%, HER2 1+ negative     Right axillary lymph node  - negative for carcinoma       4/19/2022 -  Cancer Staged    Staging form: Breast, AJCC 8th Edition  - Clinical stage from 4/19/2022: Stage IA (cT1c, cN0(f), cM0, G1, ER+, NJ+, HER2-) - Signed by Beverley Burton MD on 4/19/2022  Stage prefix: Initial diagnosis  Method of lymph node assessment: Core biopsy  Histologic grading system: 3 grade system       5/2022 Genetic Testing    [facility] BRCAplus STAT Panel (8 genes): ELEANOR, BRCA1, BRCA2, CDH1, CHEK2, PALB2, PTEN, TP53  Test(s): APC, AXIN2 BARD1, BRIP1, BMPR1A, CDK4, CDKN2A, DICER1, EPCAM, GREM1, HOXB13, MLH1, MSH2, MSH3, MSH6, MUTYH, NBN, NF1, NTHL1, PMS2, POLD1, POLE, RAD51C, RAD51D, RECQL SMAD4, SMARCA4, STK11      Result:   Negative - No Clinically Significant Variants Detected       6/23/2022 - 6/23/2022 Radiation    Field Numbers Energy Treatment Site Starting  Ending  Elapsed  Fraction Total  Overlap Site         Date Date Days Dose Dose     IORT  50 kVp Right Breast 6-23-22 6-23-22 0 2000 cGy 2000 cGy       Dr Paulino     6/23/2022 Surgery    Right breast lumpectomy, SLNB (Dr Burton), IORT  - Negative margins  - 0/2 lymph nodes       7/22/2022 -  Hormone Therapy    Anastrozole 1 mg daily    Dr Mcqueen        Review of Systems Refer to nursing note.    Current Outpatient Medications on File Prior to Visit   Medication Sig Dispense Refill   • acetaminophen (TYLENOL) 650 mg CR tablet Take 1 tablet (650 mg total) by mouth every 8 (eight) hours as needed for mild pain 30 tablet 0   • albuterol (Ventolin HFA) 90 mcg/act inhaler Inhale 2 puffs every 6 (six) hours as needed for  wheezing 18 g 5   • amLODIPine (NORVASC) 2.5 mg tablet Take 1 tablet (2.5 mg total) by mouth daily 90 tablet 1   • anastrozole (ARIMIDEX) 1 mg tablet TAKE 1 TABLET DAILY 90 tablet 3   • Calcium 200 MG TABS Take by mouth daily     • Cholecalciferol (VITAMIN D3) 2000 units capsule Take 2,000 Units by mouth daily     • fexofenadine (ALLEGRA) 180 MG tablet Take 180 mg by mouth daily in the early morning     • fluticasone (FLONASE) 50 mcg/act nasal spray 1 spray into each nostril if needed     • hydroCHLOROthiazide 12.5 mg tablet Take 1 tablet (12.5 mg total) by mouth daily 90 tablet 3   • levothyroxine 50 mcg tablet TAKE 1 TABLET DAILY 90 tablet 3   • losartan (COZAAR) 100 MG tablet TAKE 1 TABLET EVERY MORNING 90 tablet 1   • Magnesium 500 MG TABS Take by mouth daily     • ondansetron (ZOFRAN) 4 mg tablet Take 1 tablet (4 mg total) by mouth every 6 (six) hours 12 tablet 0   • rizatriptan (MAXALT-MLT) 10 mg disintegrating tablet Take one tablet at onset of headache. May repeat once in 2 hrs as needed. 9 tablet 2     Current Facility-Administered Medications on File Prior to Visit   Medication Dose Route Frequency Provider Last Rate Last Admin   • diphenhydrAMINE (BENADRYL) injection 50 mg  50 mg Intramuscular Q6H PRN Meredith Slade PA-C   50 mg at 02/02/18 1440      Social History     Tobacco Use   • Smoking status: Never   • Smokeless tobacco: Never   Vaping Use   • Vaping status: Never Used   Substance and Sexual Activity   • Alcohol use: Yes     Comment: Socially   • Drug use: No   • Sexual activity: Not Currently     Partners: Male     Birth control/protection: Female Sterilization     Comment: rosita       Objective   /95 (BP Location: Left arm)   Pulse 70   Temp (!) 97 °F (36.1 °C) (Temporal)   Resp 17   Wt 91.2 kg (201 lb)   LMP  (LMP Unknown)   SpO2 98%   BMI 35.61 kg/m²     Pain Screening:  Pain Score: 0-No pain  ECOG ECOG Performance Status: 1 - Restricted in physically strenuous activity but  ambulatory and able to carry out work of a light or sedentary nature, e.g., light house work, office work  Physical Exam  Vitals and nursing note reviewed.   Constitutional:       General: She is not in acute distress.     Appearance: Normal appearance. She is well-developed. She is not diaphoretic.   HENT:      Head: Normocephalic and atraumatic.      Mouth/Throat:      Pharynx: No oropharyngeal exudate.   Eyes:      General: No scleral icterus.     Conjunctiva/sclera: Conjunctivae normal.      Pupils: Pupils are equal, round, and reactive to light.   Neck:      Thyroid: No thyromegaly.      Trachea: No tracheal deviation.   Cardiovascular:      Rate and Rhythm: Normal rate and regular rhythm.      Heart sounds: Normal heart sounds.   Pulmonary:      Effort: Pulmonary effort is normal. No respiratory distress.      Breath sounds: Normal breath sounds. No stridor. No wheezing, rhonchi or rales.   Chest:      Chest wall: No tenderness.   Abdominal:      General: Bowel sounds are normal. There is no distension.      Palpations: Abdomen is soft. There is no mass.      Tenderness: There is no abdominal tenderness.      Hernia: No hernia is present.   Musculoskeletal:         General: No swelling or tenderness. Normal range of motion.      Cervical back: Normal range of motion and neck supple.   Lymphadenopathy:      Cervical: No cervical adenopathy.   Skin:     General: Skin is warm and dry.      Coloration: Skin is not jaundiced or pale.      Findings: No erythema or rash.   Neurological:      General: No focal deficit present.      Mental Status: She is alert and oriented to person, place, and time. Mental status is at baseline.      Cranial Nerves: No cranial nerve deficit.      Sensory: No sensory deficit.      Motor: No weakness.      Coordination: Coordination normal.      Gait: Gait normal.      Deep Tendon Reflexes: Reflexes normal.   Psychiatric:         Mood and Affect: Mood normal.         Behavior: Behavior  "normal.         Thought Content: Thought content normal.         Judgment: Judgment normal.        Administrative Statements   I have spent a total time of 20 minutes in caring for this patient on the day of the visit/encounter including Diagnostic results, Prognosis, Impressions, Counseling / Coordination of care, Documenting in the medical record, Reviewing/placing orders in the medical record (including tests, medications, and/or procedures), and Obtaining or reviewing history  .  Portions of the record may have been created with voice recognition software.  Occasional wrong word or \"sound a like\" substitutions may have occurred due to the inherent limitations of voice recognition software.  Read the chart carefully and recognize, using context, where substitutions have occurred.  "

## 2025-03-05 NOTE — ASSESSMENT & PLAN NOTE
Orders:  •  MRI brain w wo contrast; Future  •  BUN; Future  •  Creatinine, serum; Future  Norma Mercedes is a 64 y.o. year old female who is now 9 years status post SRT for a meningioma.  I reviewed her recent MRI of the brain which is stable compared to prior studies.  She also underwent IORT stage IA right breast carcinoma with follow-up mammogram stable.  She remains on anastrozole.  She follows with Dr. Reynoso and surgical oncology.  Since she is doing well now 9 years post completion of treatment, she will return in 2 years for follow-up with a repeat MRI of the brain.
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
## 2025-03-31 DIAGNOSIS — I10 ESSENTIAL HYPERTENSION: ICD-10-CM

## 2025-03-31 RX ORDER — HYDROCHLOROTHIAZIDE 12.5 MG/1
12.5 TABLET ORAL DAILY
Qty: 90 TABLET | Refills: 1 | Status: SHIPPED | OUTPATIENT
Start: 2025-03-31

## 2025-04-23 DIAGNOSIS — E03.9 HYPOTHYROIDISM, UNSPECIFIED TYPE: ICD-10-CM

## 2025-04-23 RX ORDER — LEVOTHYROXINE SODIUM 50 UG/1
50 TABLET ORAL DAILY
Qty: 90 TABLET | Refills: 0 | Status: SHIPPED | OUTPATIENT
Start: 2025-04-23

## 2025-04-25 DIAGNOSIS — I10 ESSENTIAL HYPERTENSION: ICD-10-CM

## 2025-04-25 RX ORDER — AMLODIPINE BESYLATE 2.5 MG/1
2.5 TABLET ORAL DAILY
Qty: 90 TABLET | Refills: 1 | Status: SHIPPED | OUTPATIENT
Start: 2025-04-25

## 2025-04-26 ENCOUNTER — APPOINTMENT (OUTPATIENT)
Dept: RADIOLOGY | Facility: CLINIC | Age: 65
End: 2025-04-26
Attending: PHYSICIAN ASSISTANT
Payer: COMMERCIAL

## 2025-04-26 ENCOUNTER — OFFICE VISIT (OUTPATIENT)
Dept: URGENT CARE | Facility: CLINIC | Age: 65
End: 2025-04-26
Payer: COMMERCIAL

## 2025-04-26 VITALS
BODY MASS INDEX: 35.9 KG/M2 | TEMPERATURE: 97.8 F | WEIGHT: 202.6 LBS | RESPIRATION RATE: 18 BRPM | HEART RATE: 82 BPM | HEIGHT: 63 IN | OXYGEN SATURATION: 97 % | SYSTOLIC BLOOD PRESSURE: 148 MMHG | DIASTOLIC BLOOD PRESSURE: 82 MMHG

## 2025-04-26 DIAGNOSIS — S92.502A: Primary | ICD-10-CM

## 2025-04-26 DIAGNOSIS — S97.122A: ICD-10-CM

## 2025-04-26 PROCEDURE — 99213 OFFICE O/P EST LOW 20 MIN: CPT | Performed by: PHYSICIAN ASSISTANT

## 2025-04-26 PROCEDURE — 73630 X-RAY EXAM OF FOOT: CPT

## 2025-04-28 ENCOUNTER — DOCUMENTATION (OUTPATIENT)
Dept: ADMINISTRATIVE | Facility: OTHER | Age: 65
End: 2025-04-28

## 2025-04-28 NOTE — PROGRESS NOTES
04/28/25 12:11 PM    Patient was called after the Urgent Care visit ; a message was left for the patient to return the call    Thank you.  Kailee Galvez MA  [facility]

## 2025-04-28 NOTE — PROGRESS NOTES
Michelle Behler, PA-C  P Patient Reported Team         Blood pressure elevated  Appointment department: [facility]  Appointment provider: Michelle Behler, PA-C  Blood pressure  04/26/25 1121 148/82  04/26/25 1100 148/82  Inactive  Date User Comment   04/26/25 1121 Michelle Behler, PA-C [[id number]] None

## 2025-05-01 ENCOUNTER — TELEPHONE (OUTPATIENT)
Age: 65
End: 2025-05-01

## 2025-05-01 ENCOUNTER — OFFICE VISIT (OUTPATIENT)
Dept: PODIATRY | Facility: CLINIC | Age: 65
End: 2025-05-01
Payer: COMMERCIAL

## 2025-05-01 VITALS — WEIGHT: 202 LBS | HEIGHT: 63 IN | BODY MASS INDEX: 35.79 KG/M2

## 2025-05-01 DIAGNOSIS — S92.502A: ICD-10-CM

## 2025-05-01 PROCEDURE — 99203 OFFICE O/P NEW LOW 30 MIN: CPT | Performed by: PODIATRIST

## 2025-05-01 PROCEDURE — 28510 TREATMENT OF TOE FRACTURE: CPT | Performed by: PODIATRIST
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
## 2025-05-01 NOTE — TELEPHONE ENCOUNTER
Caller: Jana Mercedes    Doctor and/or Office: Dr. Cordero/Nicole    #: [phone]    Escalation: Kieran/ offered her to be out of work and she said no. Since getting home she has changed her mind and would like a letter to be put in her My Chart saying she had to be off yesterday, today and tomorrow. She will get it from her My Chart, but please call her back to let her know it is in the computer for her to retrieve. Thanks

## 2025-05-09 ENCOUNTER — HOSPITAL ENCOUNTER (OUTPATIENT)
Dept: MAMMOGRAPHY | Facility: CLINIC | Age: 65
Discharge: HOME | End: 2025-05-09
Payer: COMMERCIAL

## 2025-05-09 VITALS — BODY MASS INDEX: 35.79 KG/M2 | HEIGHT: 63 IN | WEIGHT: 202 LBS

## 2025-05-09 DIAGNOSIS — C50.511 MALIGNANT NEOPLASM OF LOWER-OUTER QUADRANT OF RIGHT BREAST OF FEMALE, ESTROGEN RECEPTOR POSITIVE (HCC): ICD-10-CM

## 2025-05-09 DIAGNOSIS — Z17.0 MALIGNANT NEOPLASM OF LOWER-OUTER QUADRANT OF RIGHT BREAST OF FEMALE, ESTROGEN RECEPTOR POSITIVE (HCC): ICD-10-CM

## 2025-05-09 PROCEDURE — 77066 DX MAMMO INCL CAD BI: CPT

## 2025-05-09 PROCEDURE — G0279 TOMOSYNTHESIS, MAMMO: HCPCS

## 2025-05-16 ENCOUNTER — OFFICE VISIT (OUTPATIENT)
Dept: FAMILY MEDICINE CLINIC | Facility: CLINIC | Age: 65
End: 2025-05-16
Payer: COMMERCIAL

## 2025-05-16 VITALS
SYSTOLIC BLOOD PRESSURE: 138 MMHG | WEIGHT: 202 LBS | OXYGEN SATURATION: 98 % | DIASTOLIC BLOOD PRESSURE: 86 MMHG | HEART RATE: 70 BPM | TEMPERATURE: 97 F | HEIGHT: 63 IN | BODY MASS INDEX: 35.79 KG/M2

## 2025-05-16 DIAGNOSIS — C50.511 MALIGNANT NEOPLASM OF LOWER-OUTER QUADRANT OF RIGHT BREAST OF FEMALE, ESTROGEN RECEPTOR POSITIVE (HCC): ICD-10-CM

## 2025-05-16 DIAGNOSIS — S92.525A CLOSED NONDISPLACED FRACTURE OF MIDDLE PHALANX OF LESSER TOE OF LEFT FOOT, INITIAL ENCOUNTER: ICD-10-CM

## 2025-05-16 DIAGNOSIS — Z17.0 MALIGNANT NEOPLASM OF LOWER-OUTER QUADRANT OF RIGHT BREAST OF FEMALE, ESTROGEN RECEPTOR POSITIVE (HCC): ICD-10-CM

## 2025-05-16 DIAGNOSIS — R59.1 LYMPHADENOPATHY: Primary | ICD-10-CM

## 2025-05-16 DIAGNOSIS — F11.20 UNCOMPLICATED OPIOID DEPENDENCE (HCC): ICD-10-CM

## 2025-05-16 PROCEDURE — 99214 OFFICE O/P EST MOD 30 MIN: CPT | Performed by: FAMILY MEDICINE

## 2025-05-16 NOTE — PROGRESS NOTES
"Name: Norma Mercedes      : 1960      MRN: [id number]  Encounter Provider: Kayode Bland DO  Encounter Date: 2025   Encounter department: [facility]  :  Assessment & Plan  Lymphadenopathy  Patient with likely mild reactive lymphadenopathy.  Recommend imaging if no improvement in symptoms or worsening symptoms in the next 1 to 2 weeks considering history of breast cancer.  Orders:    CT soft tissue neck w contrast; Future    Uncomplicated opioid dependence (HCC)         Malignant neoplasm of lower-outer quadrant of right breast of female, estrogen receptor positive (HCC)  No evidence of recurrence.       Closed nondisplaced fracture of middle phalanx of lesser toe of left foot, initial encounter  Toe fracture is gradually healing.  Consider reimaging if no improvement over the next 1 to 3 weeks.  Consider orthopedic or podiatry evaluation if needed              History of Present Illness   Patient with 2 concerns today.  She has small lymph nodes of the posterior left neck.  She does have history of breast cancer with no evidence of recurrence from several years ago.  Area is not painful.  She also broke her left third toe 3 weeks ago.  She still continues to have mild pain but this is gradually improving.      Review of Systems   Constitutional: Negative.    HENT: Negative.     Eyes: Negative.    Respiratory: Negative.     Cardiovascular: Negative.    Gastrointestinal: Negative.    Endocrine: Negative.    Genitourinary: Negative.    Musculoskeletal:  Positive for arthralgias.   Skin: Negative.    Allergic/Immunologic: Negative.    Neurological: Negative.    Hematological:  Positive for adenopathy.   Psychiatric/Behavioral: Negative.         Objective   /86   Pulse 70   Temp (!) 97 °F (36.1 °C)   Ht 5' 3\" (1.6 m)   Wt 91.6 kg (202 lb)   LMP  (LMP Unknown)   SpO2 98%   BMI 35.78 kg/m²      Physical Exam  Constitutional:       General: She is not in acute distress.     " Appearance: She is well-developed. She is not diaphoretic.   HENT:      Head: Normocephalic and atraumatic.      Right Ear: External ear normal.      Left Ear: External ear normal.      Nose: Nose normal.      Mouth/Throat:      Pharynx: Oropharynx is clear.     Eyes:      General: No scleral icterus.        Right eye: No discharge.         Left eye: No discharge.      Conjunctiva/sclera: Conjunctivae normal.      Pupils: Pupils are equal, round, and reactive to light.     Neck:      Thyroid: No thyromegaly.      Vascular: No JVD.      Trachea: No tracheal deviation.     Cardiovascular:      Rate and Rhythm: Normal rate and regular rhythm.      Heart sounds: Normal heart sounds. No murmur heard.     No friction rub. No gallop.   Pulmonary:      Effort: Pulmonary effort is normal. No respiratory distress.      Breath sounds: Normal breath sounds. No wheezing or rales.   Chest:      Chest wall: No tenderness.   Abdominal:      General: Bowel sounds are normal. There is no distension.      Palpations: Abdomen is soft. There is no mass.      Tenderness: There is no abdominal tenderness. There is no guarding or rebound.      Hernia: No hernia is present.     Musculoskeletal:         General: No tenderness or deformity. Normal range of motion.      Cervical back: Normal range of motion and neck supple.   Lymphadenopathy:      Cervical: Cervical adenopathy present.      Left cervical: Posterior cervical adenopathy (Soft mobile subcentimeter left posterior node.  No other nodes palpable.) present.     Skin:     General: Skin is warm and dry.      Capillary Refill: Capillary refill takes less than 2 seconds.      Coloration: Skin is not pale.      Findings: No erythema or rash.     Neurological:      Mental Status: She is alert and oriented to person, place, and time.      Cranial Nerves: No cranial nerve deficit.      Sensory: No sensory deficit.      Motor: No abnormal muscle tone.      Coordination: Coordination normal.       Deep Tendon Reflexes: Reflexes normal.     Psychiatric:         Mood and Affect: Mood normal.         Behavior: Behavior normal.

## 2025-05-30 ENCOUNTER — OFFICE VISIT (OUTPATIENT)
Dept: HEMATOLOGY ONCOLOGY | Facility: CLINIC | Age: 65
End: 2025-05-30
Payer: COMMERCIAL

## 2025-05-30 VITALS
BODY MASS INDEX: 35.79 KG/M2 | HEIGHT: 63 IN | RESPIRATION RATE: 18 BRPM | HEART RATE: 68 BPM | DIASTOLIC BLOOD PRESSURE: 74 MMHG | SYSTOLIC BLOOD PRESSURE: 124 MMHG | TEMPERATURE: 97.3 F | WEIGHT: 202 LBS | OXYGEN SATURATION: 98 %

## 2025-05-30 DIAGNOSIS — C50.511 MALIGNANT NEOPLASM OF LOWER-OUTER QUADRANT OF RIGHT BREAST OF FEMALE, ESTROGEN RECEPTOR POSITIVE (HCC): Primary | ICD-10-CM

## 2025-05-30 DIAGNOSIS — M85.80 OSTEOPENIA, UNSPECIFIED LOCATION: ICD-10-CM

## 2025-05-30 DIAGNOSIS — Z79.811 AROMATASE INHIBITOR USE: ICD-10-CM

## 2025-05-30 DIAGNOSIS — Z17.0 MALIGNANT NEOPLASM OF LOWER-OUTER QUADRANT OF RIGHT BREAST OF FEMALE, ESTROGEN RECEPTOR POSITIVE (HCC): Primary | ICD-10-CM

## 2025-05-30 PROCEDURE — 99214 OFFICE O/P EST MOD 30 MIN: CPT | Performed by: INTERNAL MEDICINE

## 2025-05-30 NOTE — ASSESSMENT & PLAN NOTE
A 64-year-old postmenopausal woman was found to have abnormality in her right breast based on a screening mammography. Therefore, she underwent right breast biopsy in April 8, 2022, which showed invasive ductal carcinoma, grade 1. This was % positive, [place] 45% positive, HER2 negative disease. She underwent genetic testing which was negative. She elected to have lumpectomy and sentinel lymph node biopsy which she did in June 23, 2022 by Dr. Burton. She had 12 x 9 x 9 mm of invasive ductal carcinoma, grade 1. There was no evidence of lymphovascular invasion. 1 sentinel lymph node was negative for metastatic disease.       The patient was encouraged to continue with the anastrozole as endocrine therapy which was started somewhere in the summer 2022.  There is no obvious hint of clinical recurrence of her resected breast cancer.  She is up-to-date with her annual mammogram.    Orders:    CBC and differential; Future    Comprehensive metabolic panel; Future    Magnesium; Future    DXA bone density spine hip and pelvis; Future

## 2025-05-30 NOTE — PROGRESS NOTES
Name: Norma Mercedes      : 1960      MRN: [id number]  Encounter Provider: Nicole Reynoso MD  Encounter Date: 2025   Encounter department: [facility] [place]  :  Assessment & Plan  Malignant neoplasm of lower-outer quadrant of right breast of female, estrogen receptor positive (HCC)  A 64-year-old postmenopausal woman was found to have abnormality in her right breast based on a screening mammography. Therefore, she underwent right breast biopsy in 2022, which showed invasive ductal carcinoma, grade 1. This was % positive, [place] 45% positive, HER2 negative disease. She underwent genetic testing which was negative. She elected to have lumpectomy and sentinel lymph node biopsy which she did in 2022 by Dr. Burton. She had 12 x 9 x 9 mm of invasive ductal carcinoma, grade 1. There was no evidence of lymphovascular invasion. 1 sentinel lymph node was negative for metastatic disease.       The patient was encouraged to continue with the anastrozole as endocrine therapy which was started somewhere in the summer .  There is no obvious hint of clinical recurrence of her resected breast cancer.  She is up-to-date with her annual mammogram.    Orders:    CBC and differential; Future    Comprehensive metabolic panel; Future    Magnesium; Future    DXA bone density spine hip and pelvis; Future    Aromatase inhibitor use         Osteopenia, unspecified location  She will be due for her next bone density scan around 2026.  She was encouraged to continue with vitamin D and calcium supplements.    Orders:    CBC and differential; Future    Comprehensive metabolic panel; Future    Magnesium; Future    DXA bone density spine hip and pelvis; Future        Return in about 1 year (around 2026) for Office Visit 20 min, Labs, Imaging.    History of Present Illness   Chief Complaint   Patient presents with    Follow-up   HPI:  A 63-year-old postmenopausal  woman was found to have abnormality in her right breast based on a screening mammography. Therefore, she underwent right breast biopsy in April 8, 2022, which showed invasive ductal carcinoma, grade 1. This was % positive, [place] 45% positive, HER2 negative disease. She underwent genetic testing which was negative. She elected to have lumpectomy and sentinel lymph node biopsy which she did in June 23, 2022 by Dr. Burton. She had 12 x 9 x 9 mm of invasive ductal carcinoma, grade 1. There was no evidence of lymphovascular invasion. 1 sentinel lymph node was negative for metastatic disease.      Interval History:  The patient came today for a follow-up visit.  She stated that she is tolerating the anastrozole with some arthralgia.  She is taking vitamin D and calcium supplements regularly.  Recent blood work from 2/25/2025 was reviewed with the patient which showed normal CBC and CMP.      Oncology History   Cancer Staging   Benign meningioma (HCC)  Staging form: Central Nervous System Tumors, Pediatric Staging  - Clinical: No stage assigned - Unsigned    Malignant neoplasm of lower-outer quadrant of right breast of female, estrogen receptor positive (HCC)  Staging form: Breast, AJCC 8th Edition  - Clinical stage from 4/19/2022: Stage IA (cT1c, cN0(f), cM0, G1, ER+, OR+, HER2-) - Signed by Beverley Burton MD on 4/19/2022  Stage prefix: Initial diagnosis  Method of lymph node assessment: Core biopsy  Histologic grading system: 3 grade system  - Pathologic stage from 7/7/2022: Stage IA (pT1c, pN0(sn), cM0, G1, ER+, OR+, HER2-) - Signed by Beverley Burton MD on 7/7/2022  Stage prefix: Initial diagnosis  Method of lymph node assessment: [place] lymph node biopsy  Histologic grading system: 3 grade system  Oncology History   Benign meningioma (HCC)   2014 Initial Diagnosis    Benign meningioma (HCC)     6/16/2014 Surgery    suboccipital craniotomy and C1 laminectomy- pathology was consistent with meningioma who grade 1      7/6/2016 - 7/25/2016 Radiation    Plan ID Energy Fractions Dose per Fraction (cGy) Total Dose Delivered (cGy) Elapsed Days   SRT R [id number] 6X 3 / 3 450 1,350 5   SRT R Foramen 6X 2 / 2 500 1,000 2           Malignant neoplasm of lower-outer quadrant of right breast of female, estrogen receptor positive (HCC)   4/8/2022 Biopsy    Right breast biopsy:  - Invasive mammary carcinoma of no special type (ductal).    %, [place] 45%, HER2 1+ negative     Right axillary lymph node  - negative for carcinoma       4/19/2022 -  Cancer Staged    Staging form: Breast, AJCC 8th Edition  - Clinical stage from 4/19/2022: Stage IA (cT1c, cN0(f), cM0, G1, ER+, SC+, HER2-) - Signed by Beverley Burtno MD on 4/19/2022  Stage prefix: Initial diagnosis  Method of lymph node assessment: Core biopsy  Histologic grading system: 3 grade system       5/2022 Genetic Testing    [facility] BRCAplus STAT Panel (8 genes): ELEANOR, BRCA1, BRCA2, CDH1, CHEK2, PALB2, PTEN, TP53  Test(s): APC, AXIN2 BARD1, BRIP1, BMPR1A, CDK4, CDKN2A, DICER1, EPCAM, GREM1, HOXB13, MLH1, MSH2, MSH3, MSH6, MUTYH, NBN, NF1, NTHL1, PMS2, POLD1, POLE, RAD51C, RAD51D, RECQL SMAD4, SMARCA4, STK11      Result:   Negative - No Clinically Significant Variants Detected       6/23/2022 - 6/23/2022 Radiation    Field Numbers Energy Treatment Site Starting  Ending  Elapsed  Fraction Total  Overlap Site         Date Date Days Dose Dose     IORT  50 kVp Right Breast 6-23-22 6-23-22 0 2000 cGy 2000 cGy       Dr Paulino     6/23/2022 Surgery    Right breast lumpectomy, SLNB (Dr Burton), IORT  - Negative margins  - 0/2 lymph nodes       7/22/2022 -  Hormone Therapy    Anastrozole 1 mg daily    Dr Mcqueen             Review of Systems   Constitutional:  Negative for chills and fever.   HENT:  Negative for ear pain and sore throat.    Eyes:  Negative for pain and visual disturbance.   Respiratory:  Negative for cough and shortness of breath.    Cardiovascular:  Negative for chest pain and  "palpitations.   Gastrointestinal:  Negative for abdominal pain and vomiting.   Genitourinary:  Negative for dysuria and hematuria.   Musculoskeletal:  Positive for arthralgias. Negative for back pain.   Skin:  Negative for color change and rash.   Neurological:  Negative for seizures and syncope.   All other systems reviewed and are negative.    Medical History Reviewed by provider this encounter:  Tobacco  Allergies  Meds  Problems  Med Hx  Surg Hx  Fam Hx     .  Medications Ordered Prior to Encounter[1]   Social History[2]      Objective   /74 (BP Location: Left arm, Patient Position: Sitting, Cuff Size: Adult)   Pulse 68   Temp (!) 97.3 °F (36.3 °C)   Resp 18   Ht 5' 3\" (1.6 m)   Wt 91.6 kg (202 lb)   LMP  (LMP Unknown)   SpO2 98%   BMI 35.78 kg/m²     Pain Screening:  Pain Score:   3  ECOG   0  Physical Exam  Constitutional:       General: She is not in acute distress.     Appearance: She is well-developed. She is not diaphoretic.   HENT:      Head: Normocephalic and atraumatic.      Nose: Nose normal.     Eyes:      General: No scleral icterus.        Right eye: No discharge.         Left eye: No discharge.      Conjunctiva/sclera: Conjunctivae normal.      Pupils: Pupils are equal, round, and reactive to light.     Neck:      Thyroid: No thyromegaly.      Vascular: No JVD.      Trachea: No tracheal deviation.     Cardiovascular:      Rate and Rhythm: Normal rate and regular rhythm.      Heart sounds: Normal heart sounds. No murmur heard.     No friction rub.   Pulmonary:      Effort: Pulmonary effort is normal. No respiratory distress.      Breath sounds: Normal breath sounds. No stridor. No wheezing or rales.   Chest:      Chest wall: No tenderness.   Abdominal:      General: Bowel sounds are normal. There is no distension.      Palpations: Abdomen is soft. There is no hepatomegaly or splenomegaly.      Tenderness: There is no abdominal tenderness. There is no guarding or rebound. "     Musculoskeletal:         General: No tenderness or deformity. Normal range of motion.      Cervical back: Normal range of motion and neck supple.   Lymphadenopathy:      Cervical: No cervical adenopathy.     Skin:     General: Skin is warm and dry.      Coloration: Skin is not pale.      Findings: No erythema or rash.     Neurological:      Mental Status: She is alert and oriented to person, place, and time.      Cranial Nerves: No cranial nerve deficit.      Coordination: Coordination normal.      Deep Tendon Reflexes: Reflexes are normal and symmetric.     Psychiatric:         Behavior: Behavior normal.         Thought Content: Thought content normal.         Judgment: Judgment normal.         Labs: I have reviewed the following labs:  Lab Results   Component Value Date/Time    WBC 4.56 02/25/2025 09:26 AM    RBC 4.46 02/25/2025 09:26 AM    Hemoglobin 12.7 02/25/2025 09:26 AM    Hematocrit 37.8 02/25/2025 09:26 AM    MCV 85 02/25/2025 09:26 AM    MCH 28.5 02/25/2025 09:26 AM    RDW 12.4 02/25/2025 09:26 AM    Platelets 205 02/25/2025 09:26 AM    Segmented % 47 02/25/2025 09:26 AM    Lymphocytes % 38 02/25/2025 09:26 AM    Monocytes % 10 02/25/2025 09:26 AM    Eosinophils Relative 4 02/25/2025 09:26 AM    Basophils Relative 1 02/25/2025 09:26 AM    Immature Grans % 0 02/25/2025 09:26 AM    Absolute Neutrophils 2.14 02/25/2025 09:26 AM     Lab Results   Component Value Date/Time    Potassium 3.6 02/25/2025 12:04 PM    Chloride 97 02/25/2025 12:04 PM    CO2 27 02/25/2025 12:04 PM    BUN 13 02/25/2025 12:04 PM    Creatinine 0.75 02/25/2025 12:04 PM    Glucose, Fasting 100 (H) 12/12/2024 09:39 AM    Calcium 9.8 02/25/2025 12:04 PM    AST 50 (H) 10/18/2024 05:49 AM    ALT 94 (H) 10/18/2024 05:49 AM    Alkaline Phosphatase 63 10/18/2024 05:49 AM    Total Protein 6.8 10/18/2024 05:49 AM    Albumin 4.2 10/18/2024 05:49 AM    Total Bilirubin 0.61 10/18/2024 05:49 AM    eGFR 85 02/25/2025 12:04 PM     Lab Results  "  Component Value Date/Time    WBC 4.56 02/25/2025 09:26 AM    RBC 4.46 02/25/2025 09:26 AM    Hemoglobin 12.7 02/25/2025 09:26 AM    Hematocrit 37.8 02/25/2025 09:26 AM    MCV 85 02/25/2025 09:26 AM    MCH 28.5 02/25/2025 09:26 AM    RDW 12.4 02/25/2025 09:26 AM    Platelets 205 02/25/2025 09:26 AM    Segmented % 47 02/25/2025 09:26 AM    Lymphocytes % 38 02/25/2025 09:26 AM    Monocytes % 10 02/25/2025 09:26 AM    Eosinophils Relative 4 02/25/2025 09:26 AM    Basophils Relative 1 02/25/2025 09:26 AM    Immature Grans % 0 02/25/2025 09:26 AM    Absolute Neutrophils 2.14 02/25/2025 09:26 AM      Lab Results   Component Value Date/Time    Sodium 136 02/25/2025 12:04 PM    Potassium 3.6 02/25/2025 12:04 PM    Chloride 97 02/25/2025 12:04 PM    CO2 27 02/25/2025 12:04 PM    ANION GAP 12 02/25/2025 12:04 PM    BUN 13 02/25/2025 12:04 PM    Creatinine 0.75 02/25/2025 12:04 PM    Glucose 123 02/25/2025 12:04 PM    Glucose, Fasting 100 (H) 12/12/2024 09:39 AM    Calcium 9.8 02/25/2025 12:04 PM    AST 50 (H) 10/18/2024 05:49 AM    ALT 94 (H) 10/18/2024 05:49 AM    Alkaline Phosphatase 63 10/18/2024 05:49 AM    Total Protein 6.8 10/18/2024 05:49 AM    Albumin 4.2 10/18/2024 05:49 AM    Total Bilirubin 0.61 10/18/2024 05:49 AM    eGFR 85 02/25/2025 12:04 PM      No results found for: \"IRON\", \"CONCFE\", \"FERRITIN\", \"TTEWCBDR14\", \"FOLATE\", \"COPPER\", \"EPOREFLAB\", \"ERYTHROPRO\", \"ESR\", \"CRP\", \"HIVAGAB\", \"HEPATITIS\"   Results for orders placed or performed in visit on 02/25/25   CBC and differential   Result Value Ref Range    WBC 4.56 4.31 - 10.16 Thousand/uL    RBC 4.46 3.81 - 5.12 Million/uL    Hemoglobin 12.7 11.5 - 15.4 g/dL    Hematocrit 37.8 34.8 - 46.1 %    MCV 85 82 - 98 fL    MCH 28.5 26.8 - 34.3 pg    MCHC 33.6 31.4 - 37.4 g/dL    RDW 12.4 11.6 - 15.1 %    MPV 12.1 8.9 - 12.7 fL    Platelets 205 149 - 390 Thousands/uL    nRBC 0 /100 WBCs    Segmented % 47 43 - 75 %    Immature Grans % 0 0 - 2 %    Lymphocytes % 38 14 - 44 " %    Monocytes % 10 4 - 12 %    Eosinophils Relative 4 0 - 6 %    Basophils Relative 1 0 - 1 %    Absolute Neutrophils 2.14 1.85 - 7.62 Thousands/µL    Absolute Immature Grans 0.01 0.00 - 0.20 Thousand/uL    Absolute Lymphocytes 1.73 0.60 - 4.47 Thousands/µL    Absolute Monocytes 0.44 0.17 - 1.22 Thousand/µL    Eosinophils Absolute 0.19 0.00 - 0.61 Thousand/µL    Basophils Absolute 0.05 0.00 - 0.10 Thousands/µL   Basic metabolic panel   Result Value Ref Range    Sodium 136 135 - 147 mmol/L    Potassium 3.6 3.5 - 5.3 mmol/L    Chloride 97 96 - 108 mmol/L    CO2 27 21 - 32 mmol/L    ANION GAP 12 4 - 13 mmol/L    BUN 13 5 - 25 mg/dL    Creatinine 0.75 0.60 - 1.30 mg/dL    Glucose 123 65 - 140 mg/dL    Calcium 9.8 8.4 - 10.2 mg/dL    eGFR 85 ml/min/1.73sq m     *Note: Due to a large number of results and/or encounters for the requested time period, some results have not been displayed. A complete set of results can be found in Results Review.                   [1]   Current Outpatient Medications on File Prior to Visit   Medication Sig Dispense Refill    acetaminophen (TYLENOL) 650 mg CR tablet Take 1 tablet (650 mg total) by mouth every 8 (eight) hours as needed for mild pain 30 tablet 0    albuterol (Ventolin HFA) 90 mcg/act inhaler Inhale 2 puffs every 6 (six) hours as needed for wheezing 18 g 5    amLODIPine (NORVASC) 2.5 mg tablet Take 1 tablet (2.5 mg total) by mouth daily 90 tablet 1    anastrozole (ARIMIDEX) 1 mg tablet TAKE 1 TABLET DAILY 90 tablet 3    Calcium 200 MG TABS Take by mouth in the morning.      Cholecalciferol (VITAMIN D3) 2000 units capsule Take 2,000 Units by mouth in the morning.      fexofenadine (ALLEGRA) 180 MG tablet Take 180 mg by mouth daily in the early morning      fluticasone (FLONASE) 50 mcg/act nasal spray 1 spray into each nostril if needed      hydroCHLOROthiazide 12.5 mg tablet TAKE 1 TABLET DAILY 90 tablet 1    levothyroxine 50 mcg tablet TAKE 1 TABLET DAILY 90 tablet 0     losartan (COZAAR) 100 MG tablet TAKE 1 TABLET EVERY MORNING 90 tablet 1    Magnesium 500 MG TABS Take by mouth in the morning.      ondansetron (ZOFRAN) 4 mg tablet Take 1 tablet (4 mg total) by mouth every 6 (six) hours 12 tablet 0    rizatriptan (MAXALT-MLT) 10 mg disintegrating tablet Take one tablet at onset of headache. May repeat once in 2 hrs as needed. 9 tablet 2     Current Facility-Administered Medications on File Prior to Visit   Medication Dose Route Frequency Provider Last Rate Last Admin    diphenhydrAMINE (BENADRYL) injection 50 mg  50 mg Intramuscular Q6H PRN Meredith Slade PA-C   50 mg at 02/02/18 1440   [2]   Social History  Tobacco Use    Smoking status: Never    Smokeless tobacco: Never   Vaping Use    Vaping status: Never Used   Substance and Sexual Activity    Alcohol use: Yes     Comment: Socially    Drug use: No    Sexual activity: Not Currently     Partners: Male     Birth control/protection: Female Sterilization     Comment: [email or web address]

## 2025-06-16 DIAGNOSIS — I10 ESSENTIAL HYPERTENSION: ICD-10-CM

## 2025-06-16 RX ORDER — LOSARTAN POTASSIUM 100 MG/1
100 TABLET ORAL EVERY MORNING
Qty: 90 TABLET | Refills: 1 | Status: SHIPPED | OUTPATIENT
Start: 2025-06-16

## 2025-08-15 ENCOUNTER — OFFICE VISIT (OUTPATIENT)
Dept: SURGICAL ONCOLOGY | Facility: CLINIC | Age: 65
End: 2025-08-15
Payer: COMMERCIAL

## (undated) DEVICE — OCCLUSIVE GAUZE STRIP,3% BISMUTH TRIBROMOPHENATE IN PETROLATUM BLEND: Brand: XEROFORM

## (undated) DEVICE — ACE WRAP 3 IN STERILE

## (undated) DEVICE — SPONGE PVP SCRUB WING STERILE

## (undated) DEVICE — GAUZE SPONGES,16 PLY: Brand: CURITY

## (undated) DEVICE — RETROGRADE KNIFE BOX OF 6: Brand: ECTRA

## (undated) DEVICE — STERILE BETHLEHEM PLASTIC HAND: Brand: CARDINAL HEALTH

## (undated) DEVICE — PLUMEPEN PRO 10FT

## (undated) DEVICE — PAD GROUNDING ADULT

## (undated) DEVICE — SHEATH, GUIDE, SAVI SCOUT®: Brand: SAVI SCOUT®

## (undated) DEVICE — TUBING SUCTION 5MM X 12 FT

## (undated) DEVICE — ELECTRODE BLADE MOD E-Z CLEAN  2.75IN 7CM -0012AM

## (undated) DEVICE — CHLORAPREP HI-LITE 26ML ORANGE

## (undated) DEVICE — DRAPE PROBE NEO-PROBE/ULTRASOUND

## (undated) DEVICE — 3M™ STERI-STRIP™ REINFORCED ADHESIVE SKIN CLOSURES, R1547, 1/2 IN X 4 IN (12 MM X 100 MM), 6 STRIPS/ENVELOPE: Brand: 3M™ STERI-STRIP™

## (undated) DEVICE — GLOVE SRG BIOGEL 7.5

## (undated) DEVICE — DRAPE SHEET THREE QUARTER

## (undated) DEVICE — SUT SILK 2-0 SH 30 IN K833H

## (undated) DEVICE — PADDING CAST 4 IN  COTTON STRL

## (undated) DEVICE — SUT MONOCRYL 4-0 PS-2 18 IN Y496G

## (undated) DEVICE — LIGACLIP MCA MULTIPLE CLIP APPLIERS, 20 MEDIUM CLIPS: Brand: LIGACLIP

## (undated) DEVICE — INTENDED FOR TISSUE SEPARATION, AND OTHER PROCEDURES THAT REQUIRE A SHARP SURGICAL BLADE TO PUNCTURE OR CUT.: Brand: BARD-PARKER SAFETY BLADES SIZE 15, STERILE

## (undated) DEVICE — ADHESIVE SKIN HIGH VISCOSITY EXOFIN 1ML

## (undated) DEVICE — GLOVE SRG BIOGEL 6

## (undated) DEVICE — RETRACTOR RING 14.1 X 14.1 CM DISP

## (undated) DEVICE — MEDI-VAC YANK SUCT HNDL W/TPRD BULBOUS TIP: Brand: CARDINAL HEALTH

## (undated) DEVICE — GLOVE INDICATOR PI UNDERGLOVE SZ 8 BLUE

## (undated) DEVICE — VIAL DECANTER

## (undated) DEVICE — SUT PROLENE 4-0 PC-3 18 IN 8634G

## (undated) DEVICE — STERILE COTTON TIPPED APPLICATORS: Brand: PURITAN

## (undated) DEVICE — NEEDLE 25GA X 1 IN SAFETY GLIDE

## (undated) DEVICE — BETHLEHEM UNIVERSAL MINOR GEN: Brand: CARDINAL HEALTH

## (undated) DEVICE — SPECIMEN CONTAINER STERILE PEEL PACK

## (undated) DEVICE — SUT MONOCRYL 3-0 SH 27 IN Y416H